# Patient Record
Sex: FEMALE | Race: WHITE | NOT HISPANIC OR LATINO | Employment: FULL TIME | URBAN - METROPOLITAN AREA
[De-identification: names, ages, dates, MRNs, and addresses within clinical notes are randomized per-mention and may not be internally consistent; named-entity substitution may affect disease eponyms.]

---

## 2018-02-28 ENCOUNTER — OFFICE VISIT (OUTPATIENT)
Dept: INTERNAL MEDICINE | Facility: CLINIC | Age: 48
End: 2018-02-28
Payer: COMMERCIAL

## 2018-02-28 VITALS
WEIGHT: 217.81 LBS | SYSTOLIC BLOOD PRESSURE: 128 MMHG | HEART RATE: 110 BPM | DIASTOLIC BLOOD PRESSURE: 82 MMHG | TEMPERATURE: 98 F | BODY MASS INDEX: 33.01 KG/M2 | HEIGHT: 68 IN | OXYGEN SATURATION: 96 %

## 2018-02-28 DIAGNOSIS — R10.9 ABDOMINAL PAIN, UNSPECIFIED ABDOMINAL LOCATION: ICD-10-CM

## 2018-02-28 DIAGNOSIS — H66.90 OTITIS MEDIA, UNSPECIFIED LATERALITY, UNSPECIFIED OTITIS MEDIA TYPE: Primary | ICD-10-CM

## 2018-02-28 DIAGNOSIS — B00.1 COLD SORE: ICD-10-CM

## 2018-02-28 DIAGNOSIS — Z30.9 ENCOUNTER FOR CONTRACEPTIVE MANAGEMENT, UNSPECIFIED TYPE: ICD-10-CM

## 2018-02-28 DIAGNOSIS — K21.9 GASTROESOPHAGEAL REFLUX DISEASE, ESOPHAGITIS PRESENCE NOT SPECIFIED: ICD-10-CM

## 2018-02-28 DIAGNOSIS — N39.0 FREQUENT UTI: ICD-10-CM

## 2018-02-28 PROCEDURE — 99203 OFFICE O/P NEW LOW 30 MIN: CPT | Mod: S$GLB,,, | Performed by: INTERNAL MEDICINE

## 2018-02-28 PROCEDURE — 99999 PR PBB SHADOW E&M-NEW PATIENT-LVL IV: CPT | Mod: PBBFAC,,, | Performed by: INTERNAL MEDICINE

## 2018-02-28 RX ORDER — NORGESTIMATE AND ETHINYL ESTRADIOL 7DAYSX3 LO
1 KIT ORAL DAILY
COMMUNITY
End: 2018-02-28 | Stop reason: SDUPTHER

## 2018-02-28 RX ORDER — ACYCLOVIR 400 MG/1
400 TABLET ORAL 2 TIMES DAILY
Qty: 30 TABLET | Refills: 0 | Status: SHIPPED | OUTPATIENT
Start: 2018-02-28 | End: 2018-04-04 | Stop reason: SDUPTHER

## 2018-02-28 RX ORDER — NORGESTIMATE AND ETHINYL ESTRADIOL 7DAYSX3 LO
1 KIT ORAL DAILY
Qty: 30 TABLET | Refills: 0 | Status: SHIPPED | OUTPATIENT
Start: 2018-02-28 | End: 2018-03-26 | Stop reason: SDUPTHER

## 2018-02-28 RX ORDER — DIPHENOXYLATE HYDROCHLORIDE AND ATROPINE SULFATE 2.5; .025 MG/1; MG/1
1 TABLET ORAL 4 TIMES DAILY PRN
COMMUNITY
End: 2018-02-28 | Stop reason: SDUPTHER

## 2018-02-28 RX ORDER — ACYCLOVIR 400 MG/1
TABLET ORAL 2 TIMES DAILY
COMMUNITY
End: 2018-02-28 | Stop reason: SDUPTHER

## 2018-02-28 RX ORDER — FAMOTIDINE 40 MG/1
40 TABLET, FILM COATED ORAL DAILY
COMMUNITY
End: 2018-02-28 | Stop reason: SDUPTHER

## 2018-02-28 RX ORDER — NITROFURANTOIN 25; 75 MG/1; MG/1
100 CAPSULE ORAL
COMMUNITY
End: 2018-02-28 | Stop reason: SDUPTHER

## 2018-02-28 RX ORDER — DIPHENOXYLATE HYDROCHLORIDE AND ATROPINE SULFATE 2.5; .025 MG/1; MG/1
1 TABLET ORAL 4 TIMES DAILY PRN
Qty: 120 TABLET | Refills: 0 | Status: SHIPPED | OUTPATIENT
Start: 2018-02-28 | End: 2018-04-04 | Stop reason: SDUPTHER

## 2018-02-28 RX ORDER — NITROFURANTOIN 25; 75 MG/1; MG/1
100 CAPSULE ORAL EVERY 12 HOURS
Qty: 15 CAPSULE | Refills: 0 | Status: SHIPPED | OUTPATIENT
Start: 2018-02-28 | End: 2018-04-04 | Stop reason: SDUPTHER

## 2018-02-28 RX ORDER — FAMOTIDINE 40 MG/1
40 TABLET, FILM COATED ORAL DAILY
Qty: 30 TABLET | Refills: 0 | Status: SHIPPED | OUTPATIENT
Start: 2018-02-28 | End: 2018-04-04 | Stop reason: SDUPTHER

## 2018-02-28 RX ORDER — CEPHALEXIN 500 MG/1
500 CAPSULE ORAL EVERY 12 HOURS
Qty: 10 CAPSULE | Refills: 0 | Status: SHIPPED | OUTPATIENT
Start: 2018-02-28 | End: 2018-03-05

## 2018-02-28 NOTE — PATIENT INSTRUCTIONS
Cephalexin tablets or capsules  What is this medicine?  CEPHALEXIN (sef a TOÑO in) is a cephalosporin antibiotic. It is used to treat certain kinds of bacterial infections It will not work for colds, flu, or other viral infections.  How should I use this medicine?  Take this medicine by mouth with a full glass of water. Follow the directions on the prescription label. This medicine can be taken with or without food. Take your medicine at regular intervals. Do not take your medicine more often than directed. Take all of your medicine as directed even if you think you are better. Do not skip doses or stop your medicine early.  Talk to your pediatrician regarding the use of this medicine in children. While this drug may be prescribed for selected conditions, precautions do apply.  What side effects may I notice from receiving this medicine?  Side effects that you should report to your doctor or health care professional as soon as possible:  · allergic reactions like skin rash, itching or hives, swelling of the face, lips, or tongue  · breathing problems  · pain or trouble passing urine  · redness, blistering, peeling or loosening of the skin, including inside the mouth  · severe or watery diarrhea  · unusually weak or tired  · yellowing of the eyes, skin  Side effects that usually do not require medical attention (report to your doctor or health care professional if they continue or are bothersome):  · gas or heartburn  · genital or anal irritation  · headache  · joint or muscle pain  · nausea, vomiting  What may interact with this medicine?  · probenecid  · some other antibiotics  What if I miss a dose?  If you miss a dose, take it as soon as you can. If it is almost time for your next dose, take only that dose. Do not take double or extra doses. There should be at least 4 to 6 hours between doses.  Where should I keep my medicine?  Keep out of the reach of children.  Store at room temperature between 59 and 86  degrees F (15 and 30 degrees C). Throw away any unused medicine after the expiration date.  What should I tell my health care provider before I take this medicine?  They need to know if you have any of these conditions:  · kidney disease  · stomach or intestine problems, especially colitis  · an unusual or allergic reaction to cephalexin, other cephalosporins, penicillins, other antibiotics, medicines, foods, dyes or preservatives  · pregnant or trying to get pregnant  · breast-feeding  What should I watch for while using this medicine?  Tell your doctor or health care professional if your symptoms do not begin to improve in a few days.  Do not treat diarrhea with over the counter products. Contact your doctor if you have diarrhea that lasts more than 2 days or if it is severe and watery.  If you have diabetes, you may get a false-positive result for sugar in your urine. Check with your doctor or health care professional.  NOTE:This sheet is a summary. It may not cover all possible information. If you have questions about this medicine, talk to your doctor, pharmacist, or health care provider. Copyright© 2017 [employer]

## 2018-02-28 NOTE — PROGRESS NOTES
Subjective:       Patient ID: Dyan Arredondo is a 47 y.o. female.    Chief Complaint: Ear Fullness (ear feels like water is in it , right ear )    HPI Mrs. Hope is a 47-year-old female who presents with chief complaint ear fullness.  She reports that 6 days ago she got a shower and she felt like there was fluid in her right ear.  She says it feels like there is liquid or water in there.  She has tried swimmer's ear drop which contains 95% rubbing alcohol.  This helped it a little bit.  The dizziness sensation of water or moisture in there returned.  She denies any hearing loss, fever, drainage from the ear, tinnitus, rhinorrhea. She does have sore throat but does not think this is related. She is leaving for trip to [place] in next few days; will not return for 12 days.    Review of Systems   Constitutional: Negative for fever.   HENT: Positive for ear pain (ear fullness) and sore throat. Negative for congestion, ear discharge and rhinorrhea.        Objective:      Physical Exam   Constitutional: She is oriented to person, place, and time. She appears well-developed and well-nourished. No distress.   HENT:   Head: Normocephalic and atraumatic.   Right Ear: External ear and ear canal normal.   Left Ear: Tympanic membrane, external ear and ear canal normal.   Nose: Nose normal.   Mouth/Throat: Oropharynx is clear and moist. No oropharyngeal exudate.   No erythema of right ear. There does appear to be fluid behind the TM and there is very dull light reflex.   Cardiovascular: Normal rate, regular rhythm, normal heart sounds and intact distal pulses.  Exam reveals no gallop and no friction rub.    No murmur heard.  Pulmonary/Chest: Effort normal and breath sounds normal. No respiratory distress. She has no wheezes. She has no rales.   Neurological: She is alert and oriented to person, place, and time.   Skin: Skin is warm and dry. She is not diaphoretic.   Psychiatric: She has a normal mood and affect. Her behavior  is normal. Judgment and thought content normal.   Vitals reviewed.      Assessment:       1. Otitis media, unspecified laterality, unspecified otitis media type    2. Cold sore    3. Encounter for contraceptive management, unspecified type    4. Abdominal pain, unspecified abdominal location    5. Gastroesophageal reflux disease, esophagitis presence not specified    6. Frequent UTI        Plan:     Discussed with patient that there aren't medicines for absorbing this fluid  As fluid is present behind the Tm, will go ahead and treat with antibiotics for acute otitis media.  Patient will start medication if pain worsens, or if fever, or if discharge from the ear  We discussed her medication allergies. She states that she has taken keflex many times in the past with no adverse reactions. So prescribing keflex for 5 days for treatment.    We also discussed her other medical issues briefly. I am refilling her medications and she will do a full establish care/annual exam visit with me when she returns from [place].

## 2018-03-01 ENCOUNTER — TELEPHONE (OUTPATIENT)
Dept: FAMILY MEDICINE | Facility: CLINIC | Age: 48
End: 2018-03-01

## 2018-03-01 NOTE — TELEPHONE ENCOUNTER
----- Message from Magalys Ace MA sent at 2/28/2018  3:41 PM CST -----  Contact: [phone]/ self       ----- Message -----  From: Zara Chappell  Sent: 2/28/2018  10:17 AM  To: Duane Wahl A Staff    Pt want to know if Dr Zepeda can take in as a new pt , states she heard great thing about her . Please advise

## 2018-03-01 NOTE — TELEPHONE ENCOUNTER
Please advise pt that my panel is closed. Dr. Sakshi Rubio will provide great care to her. Pt seen by Dr. Rubio on 2/28/18.

## 2018-03-05 ENCOUNTER — TELEPHONE (OUTPATIENT)
Dept: FAMILY MEDICINE | Facility: CLINIC | Age: 48
End: 2018-03-05

## 2018-03-05 DIAGNOSIS — H66.90 OTITIS MEDIA, UNSPECIFIED LATERALITY, UNSPECIFIED OTITIS MEDIA TYPE: Primary | ICD-10-CM

## 2018-03-05 NOTE — TELEPHONE ENCOUNTER
----- Message from Zara Chappell sent at 3/5/2018  8:32 AM CST -----  Contact: [phone]/ self   Pt called stating she is still having problems with her ear . Please advise

## 2018-03-05 NOTE — TELEPHONE ENCOUNTER
Spoke with patient and she reports she is on the 11th day of fluid in inner ear. Completed antibiotics today, wondering if she needs another script. Informed will send message to Doctor and call back. Patient voices understanding.

## 2018-03-05 NOTE — TELEPHONE ENCOUNTER
----- Message from Berta Scruggs sent at 3/5/2018  2:19 PM CST -----  Contact: [phone]/self  Patient called in returning your call. Please advise.

## 2018-03-06 ENCOUNTER — TELEPHONE (OUTPATIENT)
Dept: INTERNAL MEDICINE | Facility: CLINIC | Age: 48
End: 2018-03-06

## 2018-03-06 NOTE — TELEPHONE ENCOUNTER
Spoke to adolfo, who previously handled the call yesterday and adolfo adv that Dr Rubio will not prescribe any more antibiotics and that she will put in a referral to ENT. I called the pt and adv them what Adolfo told me per Dr. Rubio, I scheduled pt ENT appt for 03/23/18 at 10:40am! Pt verbalizes understanding!

## 2018-03-06 NOTE — TELEPHONE ENCOUNTER
Spoke to pt , pt is in [place] and is adv she has fluid that I in her ears, been in her ears for 11 days. Pt adv she is done with all antibiotics and wants to know does the antibiotics stay in her system or will she needs a refill so this dosen't become an infection in her ear. Please advise! Pt is also trying to find a pharmacy pout there so medications can be sent to her. I adv will call pt back with Dr. Rubio's Decision! Pt verbalizes understandnng!

## 2018-03-06 NOTE — TELEPHONE ENCOUNTER
----- Message from Stephanie Day sent at 3/6/2018 11:08 AM CST -----  Contact: [phone]/ewjr  Patient states she is returning your call. Please advise.

## 2018-03-23 ENCOUNTER — CLINICAL SUPPORT (OUTPATIENT)
Dept: OTOLARYNGOLOGY | Facility: CLINIC | Age: 48
End: 2018-03-23
Payer: COMMERCIAL

## 2018-03-23 ENCOUNTER — OFFICE VISIT (OUTPATIENT)
Dept: OTOLARYNGOLOGY | Facility: CLINIC | Age: 48
End: 2018-03-23
Payer: COMMERCIAL

## 2018-03-23 VITALS
BODY MASS INDEX: 32.88 KG/M2 | HEART RATE: 86 BPM | DIASTOLIC BLOOD PRESSURE: 93 MMHG | SYSTOLIC BLOOD PRESSURE: 140 MMHG | HEIGHT: 68 IN | WEIGHT: 216.94 LBS

## 2018-03-23 DIAGNOSIS — H69.93 ETD (EUSTACHIAN TUBE DYSFUNCTION), BILATERAL: Primary | ICD-10-CM

## 2018-03-23 DIAGNOSIS — H69.91 ETD (EUSTACHIAN TUBE DYSFUNCTION), RIGHT: Primary | ICD-10-CM

## 2018-03-23 DIAGNOSIS — T16.1XXA FOREIGN BODY OF RIGHT EAR, INITIAL ENCOUNTER: ICD-10-CM

## 2018-03-23 PROCEDURE — 92552 PURE TONE AUDIOMETRY AIR: CPT | Mod: S$GLB,,, | Performed by: AUDIOLOGIST-HEARING AID FITTER

## 2018-03-23 PROCEDURE — 92504 EAR MICROSCOPY EXAMINATION: CPT | Mod: S$GLB,,, | Performed by: OTOLARYNGOLOGY

## 2018-03-23 PROCEDURE — 99999 PR PBB SHADOW E&M-EST. PATIENT-LVL III: CPT | Mod: PBBFAC,,, | Performed by: OTOLARYNGOLOGY

## 2018-03-23 PROCEDURE — 92567 TYMPANOMETRY: CPT | Mod: S$GLB,,, | Performed by: AUDIOLOGIST-HEARING AID FITTER

## 2018-03-23 PROCEDURE — 92556 SPEECH AUDIOMETRY COMPLETE: CPT | Mod: S$GLB,,, | Performed by: AUDIOLOGIST-HEARING AID FITTER

## 2018-03-23 PROCEDURE — 99203 OFFICE O/P NEW LOW 30 MIN: CPT | Mod: 25,S$GLB,, | Performed by: OTOLARYNGOLOGY

## 2018-03-23 PROCEDURE — 99999 PR PBB SHADOW E&M-EST. PATIENT-LVL I: CPT | Mod: PBBFAC,,, | Performed by: AUDIOLOGIST-HEARING AID FITTER

## 2018-03-23 RX ORDER — NORGESTIMATE AND ETHINYL ESTRADIOL 0.25-0.035
1 KIT ORAL DAILY
COMMUNITY
Start: 2018-02-23 | End: 2018-04-04

## 2018-03-23 RX ORDER — FLUTICASONE PROPIONATE 50 MCG
2 SPRAY, SUSPENSION (ML) NASAL DAILY
Qty: 1 BOTTLE | Refills: 12
Start: 2018-03-23 | End: 2018-04-04 | Stop reason: SDUPTHER

## 2018-03-23 NOTE — PROGRESS NOTES
Brayden Costa, F-AAA  [facility] [address]  [address]  [place] [address]      Patient: Dyan Arredondo   MRN: [id number]   : 1970  POSEY: 2018    AUDIOLOGICAL EVALUATION    CASE HISTORY:    Dyan Arredondo, 47 y.o., was seen on the above date for an audiological evaluation. Patient reported fluid in her right ear, possibly her left. No further history significant to hearing loss was reported.    TEST RESULTS:    Otoscopy was unremarkable for both ears. Imittance testing of both ears revealed normal middle ear compliance (Type A). Speech reception thresholds were obtained at 5 dB HL bilaterally, which was consistent with pure tone results. Word recognitions scores of 100% were obtained in both ears using a presentation level of 45 dB HL.    IMPRESSION:   Audiological testing indicated that Dyan Arredondo has normal hearing in both ears.    RECOMMENDATIONS:   There are no audiological recommendations at this time.    If you should have any questions or concerns regarding the above information, please do not hesitate to contact me at [phone].      _______________________________  Lili Costa, CCC-A  Clinical Audiologist    enclosure: audiogram

## 2018-03-23 NOTE — LETTER
March 26, 2018      Sakshi Rubio MD  [address]  [place] [address]           [facility] Otorhinolaryngology  [address] [place] [address], [address]  [place] [address]  Phone: [phone]  Fax: [phone]          Patient: Dyan Arredondo   MR Number: [id number]   YOB: 1970   Date of Visit: 3/23/2018       Dear Dr. Sakshi Rubio:    Thank you for referring Dyan Arredondo to me for evaluation. Attached you will find relevant portions of my assessment and plan of care.    If you have questions, please do not hesitate to call me. I look forward to following Dyan Arredondo along with you.    Sincerely,    Le Slater MD    Enclosure  CC:  No Recipients    If you would like to receive this communication electronically, please contact [email or web address] or [phone] to request more information on [employer] access.    For providers and/or their staff who would like to refer a patient to [facility], please contact us through our one-stop-shop provider referral line, [facility], at [phone].    If you feel you have received this communication in error or would no longer like to receive these types of communications, please e-mail [email or web address]

## 2018-03-23 NOTE — PROGRESS NOTES
"  Chief Complaint   Patient presents with    Other     fluid in right ear    Otalgia     right ear   .     HPI: Dyan Arredondo is a 47 y.o. female who is referred by Dr. Rubio for right ear stuffiness",ear fullness which has been present for 1 month.  She notes a swishing/scratching/fluid noise in her right ear. She reports the symptoms have been are gradual in onsetShe feels her symptoms are gradually worsening.  She has been experiencing nasal congestion, post nasal drip and hearing loss. She reports that she does not have ear pain.  She has been treated with Keflex by Dr. Thakkar for AOM.  She feels this treatment helped somewhat. She has also been self treating with swimmer's ear drops.       History reviewed. No pertinent past medical history.  Social History     Social History    Marital status:      Spouse name: N/A    Number of children: N/A    Years of education: N/A     Occupational History    Not on file.     Social History Main Topics    Smoking status: Never Smoker    Smokeless tobacco: Never Used    Alcohol use Yes    Drug use: No    Sexual activity: Yes     Partners: Male     Other Topics Concern    Not on file     Social History Narrative    No narrative on file     Past Surgical History:   Procedure Laterality Date    EXTERNAL EAR SURGERY      turbanite       Family History   Problem Relation Age of Onset    Cancer Father     Depression Brother            Review of Systems  General: negative for chills, fever or weight loss  Psychological: negative for mood changes or depression  Ophthalmic: negative for blurry vision, photophobia or eye pain  ENT: see HPI  Respiratory: no cough, shortness of breath, or wheezing  Cardiovascular: no chest pain or dyspnea on exertion  Gastrointestinal: no abdominal pain, change in bowel habits, or black/ bloody stools  Musculoskeletal: negative for gait disturbance or muscular weakness  Neurological: no syncope or seizures; no " ataxia  Dermatological: negative for puritis,  rash and jaundice  Hematologic/lymphatic: no easy bruising, no new lumps or bumps      Physical Exam:    Vitals:    03/23/18 1028   BP: (!) 140/93   Pulse: 86       Constitutional: Well appearing / communicating without difficutly.  NAD.  Eyes: EOM I Bilaterally  Head/Face: Normocephalic.  Negative paranasal sinus pressure/tenderness.  Salivary glands WNL.  House Brackmann I Bilaterally.    Right Ear: Auricle normal appearance. External Auditory Canal within normal limits no lesions or masses,TM retracted with limited mobility.; a strand of hair is abutting the TM.    Left Ear: Auricle normal appearance. External Auditory Canal within normal limits no lesions or masses,TM retracted with limited mobility  Nose: No gross nasal septal deviation. Inferior Turbinates 3+ bilaterally. No septal perforation. No masses/lesions. External nasal skin appears normal without masses/lesions.  Oral Cavity: Gingiva/lips within normal limits.  Dentition/gingiva healthy appearing. Mucus membranes moist. Floor of mouth soft, no masses palpated. Oral Tongue mobile. Hard Palate appears normal.    Oropharynx: Base of tongue appears normal. No masses/lesions noted. Tonsillar fossa/pharyngeal wall without lesions. Posterior oropharynx WNL.  Soft palate without masses. Midline uvula.   Neck/Lymphatic: No LAD I-VI bilaterally.  No thyromegaly.  No masses noted on exam.    Mirror laryngoscopy/nasopharyngoscopy: Active gag reflex.  Unable to perform.    Neuro/Psychiatric: AOx3.  Normal mood and affect.   Cardiovascular: Normal carotid pulses bilaterally, no increasing jugular venous distention noted at cervical region bilaterally.    Respiratory: Normal respiratory effort, no stridor, no retractions noted.      See separate procedure note for binocular microscopy.         Audiogram reviewed personally by myself and in detail with the patient today. Findings: normal hearing. Normal imittance testing  bilaterally.       Assessment:    ICD-10-CM ICD-9-CM    1. ETD (Eustachian tube dysfunction), bilateral H69.83 381.81    2. Foreign body of right ear, initial encounter T16.1XXA 931      E915      The primary encounter diagnosis was ETD (Eustachian tube dysfunction), bilateral. A diagnosis of Foreign body of right ear, initial encounter was also pertinent to this visit.      Plan:  No orders of the defined types were placed in this encounter.    We had a long discussion regarding the anatomy and function of the eustachian tube.  We discussed that the eustachian tube acts as a pump to keep the appropriate amount of pressure behind the ear drum. I discussed auto-insufflation exercises.   Continue Flonase as prescribed.    Removal of hair from TM. Expect improvement with the scratching sounds she was noting.       Le Slater MD

## 2018-03-26 DIAGNOSIS — Z30.9 ENCOUNTER FOR CONTRACEPTIVE MANAGEMENT, UNSPECIFIED TYPE: ICD-10-CM

## 2018-03-26 RX ORDER — NORGESTIMATE AND ETHINYL ESTRADIOL 7DAYSX3 LO
1 KIT ORAL DAILY
Qty: 30 TABLET | Refills: 0 | Status: SHIPPED | OUTPATIENT
Start: 2018-03-26 | End: 2018-04-04 | Stop reason: SDUPTHER

## 2018-03-26 RX ORDER — NORGESTIMATE AND ETHINYL ESTRADIOL 7DAYSX3 LO
1 KIT ORAL DAILY
Qty: 30 TABLET | Refills: 0 | Status: SHIPPED | OUTPATIENT
Start: 2018-03-26 | End: 2018-03-26 | Stop reason: SDUPTHER

## 2018-03-26 NOTE — TELEPHONE ENCOUNTER
----- Message from Berta Scruggs sent at 3/23/2018  3:04 PM CDT -----  Contact: [phone]/self  Patient is requesting to have a refill ofnorgestimate-ethinyl estradiol (ORTHO TRI-CYCLEN LO) 0.18/0.215/0.25 mg-25 mcg tablet  sent to [facility] #[id number] - [place], [place] - [address] . Please advise.

## 2018-03-26 NOTE — PROCEDURES
Procedures     Binocular Microscopy    Indication:  foreign body     Additional detail was required for the otoscopic examination of the right ear.  The operating microscope was used to directly visualize the tympanic membrane and middle ear landmarks.  Findings:  Canal skin:  normal   TM:  intact; there was a strand of hair abutting the TM which was removed.    Ossicles:  normal   Effusion:  none     The patient tolerated the procedure well.

## 2018-04-04 ENCOUNTER — LAB VISIT (OUTPATIENT)
Dept: LAB | Facility: HOSPITAL | Age: 48
End: 2018-04-04
Attending: INTERNAL MEDICINE
Payer: COMMERCIAL

## 2018-04-04 ENCOUNTER — OFFICE VISIT (OUTPATIENT)
Dept: INTERNAL MEDICINE | Facility: CLINIC | Age: 48
End: 2018-04-04
Payer: COMMERCIAL

## 2018-04-04 VITALS
TEMPERATURE: 98 F | OXYGEN SATURATION: 97 % | WEIGHT: 216.94 LBS | HEART RATE: 90 BPM | DIASTOLIC BLOOD PRESSURE: 76 MMHG | BODY MASS INDEX: 32.98 KG/M2 | SYSTOLIC BLOOD PRESSURE: 118 MMHG

## 2018-04-04 DIAGNOSIS — Z00.00 ANNUAL PHYSICAL EXAM: ICD-10-CM

## 2018-04-04 DIAGNOSIS — K21.9 GASTROESOPHAGEAL REFLUX DISEASE, ESOPHAGITIS PRESENCE NOT SPECIFIED: ICD-10-CM

## 2018-04-04 DIAGNOSIS — N39.0 FREQUENT UTI: ICD-10-CM

## 2018-04-04 DIAGNOSIS — Z00.00 ANNUAL PHYSICAL EXAM: Primary | ICD-10-CM

## 2018-04-04 DIAGNOSIS — R20.2 TINGLING IN EXTREMITIES: ICD-10-CM

## 2018-04-04 DIAGNOSIS — B00.1 COLD SORE: ICD-10-CM

## 2018-04-04 DIAGNOSIS — Z23 NEED FOR TETANUS BOOSTER: ICD-10-CM

## 2018-04-04 DIAGNOSIS — Z30.9 ENCOUNTER FOR CONTRACEPTIVE MANAGEMENT, UNSPECIFIED TYPE: ICD-10-CM

## 2018-04-04 DIAGNOSIS — Z23 NEED FOR INFLUENZA VACCINATION: ICD-10-CM

## 2018-04-04 DIAGNOSIS — R10.9 ABDOMINAL PAIN, UNSPECIFIED ABDOMINAL LOCATION: ICD-10-CM

## 2018-04-04 LAB
ALBUMIN SERPL BCP-MCNC: 3.9 G/DL
ALP SERPL-CCNC: 63 U/L
ALT SERPL W/O P-5'-P-CCNC: 6 U/L
ANION GAP SERPL CALC-SCNC: 11 MMOL/L
AST SERPL-CCNC: 15 U/L
BASOPHILS # BLD AUTO: 0.11 K/UL
BASOPHILS NFR BLD: 1 %
BILIRUB SERPL-MCNC: 0.3 MG/DL
BUN SERPL-MCNC: 10 MG/DL
CALCIUM SERPL-MCNC: 9.6 MG/DL
CHLORIDE SERPL-SCNC: 107 MMOL/L
CHOLEST SERPL-MCNC: 160 MG/DL
CHOLEST/HDLC SERPL: 2.1 {RATIO}
CO2 SERPL-SCNC: 22 MMOL/L
CREAT SERPL-MCNC: 0.8 MG/DL
DIFFERENTIAL METHOD: ABNORMAL
EOSINOPHIL # BLD AUTO: 0.2 K/UL
EOSINOPHIL NFR BLD: 1.4 %
ERYTHROCYTE [DISTWIDTH] IN BLOOD BY AUTOMATED COUNT: 12.4 %
EST. GFR  (AFRICAN AMERICAN): >60 ML/MIN/1.73 M^2
EST. GFR  (NON AFRICAN AMERICAN): >60 ML/MIN/1.73 M^2
GLUCOSE SERPL-MCNC: 84 MG/DL
HCT VFR BLD AUTO: 43.6 %
HDLC SERPL-MCNC: 77 MG/DL
HDLC SERPL: 48.1 %
HGB BLD-MCNC: 14.1 G/DL
HIV 1+2 AB+HIV1 P24 AG SERPL QL IA: NEGATIVE
IMM GRANULOCYTES # BLD AUTO: 0.04 K/UL
IMM GRANULOCYTES NFR BLD AUTO: 0.4 %
LDLC SERPL CALC-MCNC: 63 MG/DL
LYMPHOCYTES # BLD AUTO: 1.9 K/UL
LYMPHOCYTES NFR BLD: 17.3 %
MCH RBC QN AUTO: 30.4 PG
MCHC RBC AUTO-ENTMCNC: 32.3 G/DL
MCV RBC AUTO: 94 FL
MONOCYTES # BLD AUTO: 0.4 K/UL
MONOCYTES NFR BLD: 3.9 %
NEUTROPHILS # BLD AUTO: 8.3 K/UL
NEUTROPHILS NFR BLD: 76 %
NONHDLC SERPL-MCNC: 83 MG/DL
NRBC BLD-RTO: 0 /100 WBC
PLATELET # BLD AUTO: 361 K/UL
PMV BLD AUTO: 10.4 FL
POTASSIUM SERPL-SCNC: 4.1 MMOL/L
PROT SERPL-MCNC: 7.9 G/DL
RBC # BLD AUTO: 4.64 M/UL
SODIUM SERPL-SCNC: 140 MMOL/L
TRIGL SERPL-MCNC: 100 MG/DL
TSH SERPL DL<=0.005 MIU/L-ACNC: 2.86 UIU/ML
VIT B12 SERPL-MCNC: 353 PG/ML
WBC # BLD AUTO: 10.89 K/UL

## 2018-04-04 PROCEDURE — 82607 VITAMIN B-12: CPT

## 2018-04-04 PROCEDURE — 85025 COMPLETE CBC W/AUTO DIFF WBC: CPT

## 2018-04-04 PROCEDURE — 80061 LIPID PANEL: CPT

## 2018-04-04 PROCEDURE — 90472 IMMUNIZATION ADMIN EACH ADD: CPT | Mod: S$GLB,,, | Performed by: INTERNAL MEDICINE

## 2018-04-04 PROCEDURE — 84443 ASSAY THYROID STIM HORMONE: CPT

## 2018-04-04 PROCEDURE — 87624 HPV HI-RISK TYP POOLED RSLT: CPT

## 2018-04-04 PROCEDURE — 86703 HIV-1/HIV-2 1 RESULT ANTBDY: CPT

## 2018-04-04 PROCEDURE — 36415 COLL VENOUS BLD VENIPUNCTURE: CPT | Mod: PO

## 2018-04-04 PROCEDURE — 90471 IMMUNIZATION ADMIN: CPT | Mod: S$GLB,,, | Performed by: INTERNAL MEDICINE

## 2018-04-04 PROCEDURE — 88175 CYTOPATH C/V AUTO FLUID REDO: CPT

## 2018-04-04 PROCEDURE — 90715 TDAP VACCINE 7 YRS/> IM: CPT | Mod: S$GLB,,, | Performed by: INTERNAL MEDICINE

## 2018-04-04 PROCEDURE — 80053 COMPREHEN METABOLIC PANEL: CPT

## 2018-04-04 PROCEDURE — 99999 PR PBB SHADOW E&M-EST. PATIENT-LVL IV: CPT | Mod: PBBFAC,,, | Performed by: INTERNAL MEDICINE

## 2018-04-04 PROCEDURE — 99396 PREV VISIT EST AGE 40-64: CPT | Mod: 25,S$GLB,, | Performed by: INTERNAL MEDICINE

## 2018-04-04 PROCEDURE — 90688 IIV4 VACCINE SPLT 0.5 ML IM: CPT | Mod: S$GLB,,, | Performed by: INTERNAL MEDICINE

## 2018-04-04 RX ORDER — NORGESTIMATE AND ETHINYL ESTRADIOL 7DAYSX3 LO
1 KIT ORAL DAILY
Qty: 90 TABLET | Refills: 4 | Status: SHIPPED | OUTPATIENT
Start: 2018-04-04 | End: 2018-04-11 | Stop reason: SDUPTHER

## 2018-04-04 RX ORDER — FLUTICASONE PROPIONATE 50 MCG
2 SPRAY, SUSPENSION (ML) NASAL DAILY
Qty: 1 BOTTLE | Refills: 11 | Status: SHIPPED | OUTPATIENT
Start: 2018-04-04 | End: 2019-01-23

## 2018-04-04 RX ORDER — ACYCLOVIR 400 MG/1
400 TABLET ORAL 2 TIMES DAILY
Qty: 30 TABLET | Refills: 1 | Status: SHIPPED | OUTPATIENT
Start: 2018-04-04 | End: 2019-05-03 | Stop reason: SDUPTHER

## 2018-04-04 RX ORDER — DIPHENOXYLATE HYDROCHLORIDE AND ATROPINE SULFATE 2.5; .025 MG/1; MG/1
1 TABLET ORAL 4 TIMES DAILY PRN
Qty: 120 TABLET | Refills: 0 | Status: SHIPPED | OUTPATIENT
Start: 2018-04-04 | End: 2019-05-03 | Stop reason: SDUPTHER

## 2018-04-04 RX ORDER — NITROFURANTOIN 25; 75 MG/1; MG/1
100 CAPSULE ORAL EVERY 12 HOURS
Qty: 30 CAPSULE | Refills: 2 | Status: SHIPPED | OUTPATIENT
Start: 2018-04-04 | End: 2019-01-23

## 2018-04-04 RX ORDER — FAMOTIDINE 40 MG/1
40 TABLET, FILM COATED ORAL DAILY
Qty: 30 TABLET | Refills: 11 | Status: SHIPPED | OUTPATIENT
Start: 2018-04-04 | End: 2018-05-02 | Stop reason: SDUPTHER

## 2018-04-04 NOTE — PROGRESS NOTES
Subjective:       Patient ID: Dyan Arredondo is a 47 y.o. female.    Chief Complaint: Establish Care and Annual Exam    HPI Mrs. Arredondo is a 47 year old female with GERD, history of cold sores, and history of urinary tract infections who presents to establish care and for annual exam.  Today she complains of a tingling sensation on the lateral aspect of her left foot.  It is not associated with pain or numbness.  She does recall trauma to that leg.  She broke her leg a few years ago.  She also continues to have a sensation of fluid in the right ear.  This is not painful.  It has improved since she was seen by me as well as an ENT and started on Flonase.  She has no further complaints today.  She requests HIV testing today.  This is part of her yearly testing.    Past medical history: GERD, cold sores, history of frequent urinary tract infection, abdominal pain  Past surgical history: External ear surgery and turbinate surgery  Social history: Patient does drink alcohol, mostly wine, not more than 2 drinks at a time.  No history of tobacco use or drug use.  Family history: Multiple paternal family relatives with lung cancer (they were smokers).  Father with cancer diagnosed at age 80, type unknown.  Mother with glaucoma.  Other with depression.  ALLERGIES: Amoxicillin, erythromycin, Omnipaque, penicillins, tetracycline  Meds: Acyclovir, Lomotil, Pepcid, Flonase, Macrobid, Ortho Tri-Cyclen    Health Maintenance: Patient is due for Pap smear.  She does have a history of abnormal Pap smears.  She has had multiple biopsies and freezing of areas of the cervix.  She states that none have ever shown any malignancy.  She is also due for mammogram, tetanus vaccine, and influenza vaccine.    Review of Systems   HENT: Positive for ear pain (sensation of fluid in right ear).    Neurological: Negative for numbness.   All other systems reviewed and are negative.      Objective:      Physical Exam   Constitutional: She is  oriented to person, place, and time. She appears well-developed and well-nourished. No distress.   HENT:   Head: Normocephalic and atraumatic.   Right Ear: Tympanic membrane, external ear and ear canal normal.   Left Ear: Tympanic membrane, external ear and ear canal normal.   Eyes: Conjunctivae and EOM are normal.   Cardiovascular: Normal rate, regular rhythm, normal heart sounds and intact distal pulses.  Exam reveals no gallop and no friction rub.    No murmur heard.  Pulmonary/Chest: Effort normal and breath sounds normal. No respiratory distress. She has no wheezes. She has no rales.   Clinical breast exam performed. Normal exam bilaterally. No masses palpated. No supraclavicular or axillary lymphadenopathy. No skin dimpling. No nipple retraction.   Abdominal: Soft. Bowel sounds are normal. She exhibits no distension.   Genitourinary:   Genitourinary Comments: Normal external genitalia. Normal vagina. Pap performed. Bimanual exam done. No masses. No cervical motion tenderness.    Neurological: She is alert and oriented to person, place, and time.   Skin: Skin is warm and dry. She is not diaphoretic.   Psychiatric: She has a normal mood and affect. Her behavior is normal. Judgment and thought content normal.   Vitals reviewed.      Assessment:       1. Annual physical exam    2. Tingling in extremities    3. Need for influenza vaccination    4. Need for tetanus booster    5. Gastroesophageal reflux disease, esophagitis presence not specified    6. Cold sore    7. Abdominal pain, unspecified abdominal location    8. Frequent UTI    9. Encounter for contraceptive management, unspecified type        Plan:     #1 annual physical exam  Pap smear with HPV Co test performed today  CMP, lipids, CBC, TSH, HIV  Mammogram ordered  Influenza and tetanus vaccines given today.    #2 tingling in extremities  Etiology unclear.  Checking TSH, CBC, B12 .  If these are normal, will refer to neurology for further evaluation.    #3  GERD  Stable, well controlled.  Continue current medication    #4 Cold sore  Not currently having outbreak. But requests medication to have on hand should one occur. Refills ordered    #5 Abdominal pain  Stable, well controlled with lomotil. Continue lomotil    #6 Frequent UTI  Stable, well controlled with macrobid as prophylaxis after intercourse  Continue current medication    #7 Contraception  Refills given of Ortho TriCyclen    RTC one year and PRN

## 2018-04-06 LAB
HPV16 AG SPEC QL: POSITIVE
HPV16+18+H RISK 12 DNA CVX-IMP: NEGATIVE
HPV18 DNA SPEC QL NAA+PROBE: NEGATIVE

## 2018-04-11 ENCOUNTER — TELEPHONE (OUTPATIENT)
Dept: FAMILY MEDICINE | Facility: CLINIC | Age: 48
End: 2018-04-11

## 2018-04-11 DIAGNOSIS — Z30.9 ENCOUNTER FOR CONTRACEPTIVE MANAGEMENT, UNSPECIFIED TYPE: ICD-10-CM

## 2018-04-11 DIAGNOSIS — R20.0 NUMBNESS OF FOOT: ICD-10-CM

## 2018-04-11 DIAGNOSIS — R87.811 VAGINAL HIGH RISK HPV DNA TEST POSITIVE: ICD-10-CM

## 2018-04-11 DIAGNOSIS — Z12.31 SCREENING MAMMOGRAM, ENCOUNTER FOR: Primary | ICD-10-CM

## 2018-04-11 RX ORDER — NORGESTIMATE AND ETHINYL ESTRADIOL 7DAYSX3 LO
1 KIT ORAL DAILY
Qty: 90 TABLET | Refills: 4 | Status: SHIPPED | OUTPATIENT
Start: 2018-04-11 | End: 2019-01-23 | Stop reason: SDUPTHER

## 2018-04-11 NOTE — TELEPHONE ENCOUNTER
Talked with Mrs. Arredondo today.  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  She is aware that I'm referring her to OB/GYN for this issue and she will receive a phone call scheduling her for this.  She also mentioned that she needs her birth control sent to the pharmacy, needs a screening mammogram, and is still having the abnormal sensations in the foot.  I am sending prescription for birth control to her pharmacy; ordering her screening mammogram; and referring her to neurology.

## 2018-04-13 ENCOUNTER — TELEPHONE (OUTPATIENT)
Dept: INTERNAL MEDICINE | Facility: CLINIC | Age: 48
End: 2018-04-13

## 2018-04-13 NOTE — TELEPHONE ENCOUNTER
----- Message from Taina Interiano sent at 4/13/2018  8:25 AM CDT -----  No. [phone]    Patient is waiting on a call to schedule her appointments with neurology and OBGYN.

## 2018-04-13 NOTE — TELEPHONE ENCOUNTER
Spoke to pt and adv that mammo is 04/27/18 at 10:15 am , OBGYN 04/23/18 at 3pm, neuro 05/01/18 at 10:40 am scheduled all at [facility]. Pt agreed to appt date and times, appt letters will be mailed out. Pt verbalizes understanding!

## 2018-04-23 ENCOUNTER — OFFICE VISIT (OUTPATIENT)
Dept: OBSTETRICS AND GYNECOLOGY | Facility: CLINIC | Age: 48
End: 2018-04-23
Payer: COMMERCIAL

## 2018-04-23 VITALS — SYSTOLIC BLOOD PRESSURE: 120 MMHG | BODY MASS INDEX: 32.92 KG/M2 | DIASTOLIC BLOOD PRESSURE: 78 MMHG | WEIGHT: 216.5 LBS

## 2018-04-23 DIAGNOSIS — Z20.2 HPV EXPOSURE: Primary | ICD-10-CM

## 2018-04-23 PROCEDURE — 99999 PR PBB SHADOW E&M-EST. PATIENT-LVL III: CPT | Mod: PBBFAC,,, | Performed by: OBSTETRICS & GYNECOLOGY

## 2018-04-23 PROCEDURE — 99203 OFFICE O/P NEW LOW 30 MIN: CPT | Mod: S$GLB,,, | Performed by: OBSTETRICS & GYNECOLOGY

## 2018-04-23 NOTE — LETTER
April 25, 2018      Sakshi Rubio MD  [address]  [place] [address]           [place] - OB/GYN  [address], [address]  [address]th [address] [facility]  [place] [address]  Phone: [phone]          Patient: Dyan Arredondo   MR Number: [id number]   YOB: 1970   Date of Visit: 4/23/2018       Dear Dr. Sakshi Rubio:    Thank you for referring Dyan Arredondo to me for evaluation. Attached you will find relevant portions of my assessment and plan of care.    If you have questions, please do not hesitate to call me. I look forward to following Dyan Arredondo along with you.    Sincerely,    Michael A. Wiedemann, MD    Enclosure  CC:  No Recipients    If you would like to receive this communication electronically, please contact [email or web address] or [phone] to request more information on [employer] Link access.    For providers and/or their staff who would like to refer a patient to [facility], please contact us through our one-stop-shop provider referral line, [facility], at [phone].    If you feel you have received this communication in error or would no longer like to receive these types of communications, please e-mail [email or web address]

## 2018-04-25 NOTE — PROGRESS NOTES
47 y.o.   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  SKIN: No rashes, itching or changes in color or texture of skin.  HEAD: No headaches or recent head trauma.  EYES: Visual acuity fine. No photophobia, ocular pain or diplopia.  EARS: Denies ear pain, discharge or vertigo.  NOSE: No loss of smell, no epistaxis or postnasal drip.  MOUTH & THROAT: No hoarseness or change in voice. No excessive gum bleeding.  NODES: Denies swollen glands.  CHEST: Denies POSEY, cyanosis, wheezing, cough and sputum production.  CARDIOVASCULAR: Denies chest pain, PND, orthopnea or reduced exercise tolerance.  ABDOMEN: Appetite fine. No weight loss. Denies diarrhea, abdominal pain, hematemesis or blood in stool.  URINARY: No flank pain, dysuria or hematuria.  PERIPHERAL VASCULAR: No claudication or cyanosis.  MUSCULOSKELETAL: No joint stiffness or swelling. Denies back pain.  NEUROLOGIC: No history of seizures, paralysis, alteration of gait or coordination      PE: /78   Wt 98.2 kg (216 lb 7.9 oz)   BMI 32.92 kg/m²    No exam done  20 min discussion with pt about hpv paps, pathophysiology etc  Questions answered    Assessment hpv 16 on pap testing  Plan, pt will fu with rpt pap in 4-6 months  Or call/come prn other problems

## 2018-04-27 ENCOUNTER — HOSPITAL ENCOUNTER (OUTPATIENT)
Dept: RADIOLOGY | Facility: HOSPITAL | Age: 48
Discharge: HOME | End: 2018-04-27
Attending: INTERNAL MEDICINE
Payer: COMMERCIAL

## 2018-04-27 DIAGNOSIS — Z12.31 SCREENING MAMMOGRAM, ENCOUNTER FOR: ICD-10-CM

## 2018-04-27 PROCEDURE — 77067 SCR MAMMO BI INCL CAD: CPT | Mod: 26,,, | Performed by: RADIOLOGY

## 2018-04-27 PROCEDURE — 77067 SCR MAMMO BI INCL CAD: CPT | Mod: TC

## 2018-04-27 PROCEDURE — 77063 BREAST TOMOSYNTHESIS BI: CPT | Mod: 26,,, | Performed by: RADIOLOGY

## 2018-04-30 ENCOUNTER — TELEPHONE (OUTPATIENT)
Dept: RADIOLOGY | Facility: HOSPITAL | Age: 48
End: 2018-04-30

## 2018-05-01 ENCOUNTER — OFFICE VISIT (OUTPATIENT)
Dept: NEUROLOGY | Facility: CLINIC | Age: 48
End: 2018-05-01
Payer: COMMERCIAL

## 2018-05-01 ENCOUNTER — LAB VISIT (OUTPATIENT)
Dept: LAB | Facility: HOSPITAL | Age: 48
End: 2018-05-01
Attending: PSYCHIATRY & NEUROLOGY
Payer: COMMERCIAL

## 2018-05-01 VITALS
HEART RATE: 85 BPM | BODY MASS INDEX: 32.51 KG/M2 | DIASTOLIC BLOOD PRESSURE: 87 MMHG | SYSTOLIC BLOOD PRESSURE: 125 MMHG | WEIGHT: 214.5 LBS | HEIGHT: 68 IN

## 2018-05-01 DIAGNOSIS — R20.0 NUMBNESS OF FOOT: ICD-10-CM

## 2018-05-01 DIAGNOSIS — M54.50 LOW BACK PAIN, NON-SPECIFIC: ICD-10-CM

## 2018-05-01 LAB — FOLATE SERPL-MCNC: 10.1 NG/ML

## 2018-05-01 PROCEDURE — 82746 ASSAY OF FOLIC ACID SERUM: CPT

## 2018-05-01 PROCEDURE — 99999 PR PBB SHADOW E&M-EST. PATIENT-LVL III: CPT | Mod: PBBFAC,,, | Performed by: PSYCHIATRY & NEUROLOGY

## 2018-05-01 PROCEDURE — 36415 COLL VENOUS BLD VENIPUNCTURE: CPT

## 2018-05-01 PROCEDURE — 99204 OFFICE O/P NEW MOD 45 MIN: CPT | Mod: S$GLB,,, | Performed by: PSYCHIATRY & NEUROLOGY

## 2018-05-01 PROCEDURE — 84425 ASSAY OF VITAMIN B-1: CPT

## 2018-05-01 NOTE — PROGRESS NOTES
Subjective:       Patient ID: Dyan Arredondo is a 47 y.o. female.    Chief Complaint:  Numbness (and tingling)      History of Present Illness  HPI  Neuropathy  She describes symptoms of tingling. Onset of symptoms was sudden, starting about 6 weeks ago. Symptoms are currently of mild severity. Symptoms occur intermittently and last hours. The patient denies burning, lancinating pain, cramping, hypersensitivity and allodynia. Symptoms are worse in the left lower extremity. She also describes autonomic symptoms of  diarrhea. She denies  orthostasis, bloating, nausea, early satiety, constipation and dry eyes and dry mouth. Previous treatment has included none, which has not improved symptoms.      Review of Systems  Review of Systems   Constitutional: Negative.    HENT:        Water in right ear - otititus media for 5 weeks   Eyes: Negative.    Respiratory: Negative.    Cardiovascular: Negative.    Gastrointestinal:        Heart burn   Endocrine: Negative.    Genitourinary: Negative.    Musculoskeletal: Positive for back pain.        2 episodes in last 2 years   Allergic/Immunologic: Positive for environmental allergies.   Neurological: Positive for numbness.   Hematological: Bruises/bleeds easily.   Psychiatric/Behavioral: Positive for sleep disturbance.   All other systems reviewed and are negative.      Objective:      Neurologic Exam     Mental Status   Oriented to person, place, and time.   Registration: recalls 3 of 3 objects. Recall at 5 minutes: recalls 3 of 3 objects. Follows 3 step commands.   Attention: normal.   Speech: speech is normal   Level of consciousness: alert  Knowledge: good. Able to perform simple calculations.   Able to name object. Able to read. Able to repeat. Able to write. Normal comprehension.     Cranial Nerves   Cranial nerves II through XII intact.     CN III, IV, VI   Pupils are equal, round, and reactive to light.  Extraocular motions are normal.     Motor Exam   Muscle bulk:  normal  Overall muscle tone: normal    Strength   Strength 5/5 throughout.     Sensory Exam   Pinprick normal.   Sensory deficit distribution on left: sural    Gait, Coordination, and Reflexes     Gait  Gait: normal    Coordination   Romberg: negative  Finger to nose coordination: normal  Heel to shin coordination: normal  Tandem walking coordination: normal    Tremor   Resting tremor: absent  Intention tremor: absent  Action tremor: absent    Reflexes   Reflexes 2+ except as noted.       Physical Exam   Constitutional: She is oriented to person, place, and time. She appears well-developed and well-nourished.   HENT:   Head: Normocephalic and atraumatic.   Eyes: EOM are normal. Pupils are equal, round, and reactive to light.   Neck: Normal range of motion.   Cardiovascular: Normal rate and regular rhythm.    Pulmonary/Chest: Effort normal.   Neurological: She is alert and oriented to person, place, and time. She has normal strength. She has a normal Finger-Nose-Finger Test, a normal Heel to Shin Test, a normal Romberg Test and a normal Tandem Gait Test. Gait normal.   Skin: Skin is warm and dry.   Psychiatric: Her speech is normal.   Vitals reviewed.        Assessment:     Problem List Items Addressed This Visit        Other    Numbness of foot     No neurological exam changes but suspect mononeuropathy. With history of LBP, need to r/o radiculopathy..  MRI of LS spine  EMG LLE  B12, folate, thiamine           Relevant Orders    EMG W/ ULTRASOUND AND NERVE CONDUCTION TEST 1 Extremity    FOLATE    VITAMIN B1      Other Visit Diagnoses     Low back pain, non-specific        Relevant Orders    MRI Lumbar Spine Without Contrast            Plan:

## 2018-05-01 NOTE — ASSESSMENT & PLAN NOTE
No neurological exam changes but suspect mononeuropathy. With history of LBP, need to r/o radiculopathy..  MRI of LS spine  EMG LLE  B12, folate, thiamine

## 2018-05-01 NOTE — LETTER
May 1, 2018      Sakshi Rubio MD  [address]  [place] [address]           [facility]  [address]  [place] [address]  Phone: [phone]  Fax: [phone]          Patient: Dyan Arredondo   MR Number: [id number]   YOB: 1970   Date of Visit: 5/1/2018       Dear Dr. Sakshi Rubio:    Thank you for referring Dyan Arredondo to me for evaluation. Attached you will find relevant portions of my assessment and plan of care.    If you have questions, please do not hesitate to call me. I look forward to following Dyan Arredondo along with you.    Sincerely,    Valarie Alexander MD    Enclosure  CC:  No Recipients    If you would like to receive this communication electronically, please contact [email or web address] or [phone] to request more information on [employer] access.    For providers and/or their staff who would like to refer a patient to [facility], please contact us through our one-stop-shop provider referral line, [facility], at [phone].    If you feel you have received this communication in error or would no longer like to receive these types of communications, please e-mail [email or web address]

## 2018-05-02 DIAGNOSIS — K21.9 GASTROESOPHAGEAL REFLUX DISEASE, ESOPHAGITIS PRESENCE NOT SPECIFIED: ICD-10-CM

## 2018-05-02 RX ORDER — FAMOTIDINE 40 MG/1
40 TABLET, FILM COATED ORAL DAILY
Qty: 90 TABLET | Refills: 3 | Status: SHIPPED | OUTPATIENT
Start: 2018-05-02 | End: 2019-05-03 | Stop reason: SDUPTHER

## 2018-05-04 LAB — VIT B1 SERPL-MCNC: 65 UG/L (ref 38–122)

## 2018-05-09 ENCOUNTER — HOSPITAL ENCOUNTER (OUTPATIENT)
Dept: RADIOLOGY | Facility: HOSPITAL | Age: 48
Discharge: HOME | End: 2018-05-09
Attending: PSYCHIATRY & NEUROLOGY
Payer: COMMERCIAL

## 2018-05-09 DIAGNOSIS — M54.50 LOW BACK PAIN, NON-SPECIFIC: ICD-10-CM

## 2018-05-09 PROCEDURE — 72148 MRI LUMBAR SPINE W/O DYE: CPT | Mod: TC

## 2018-05-09 PROCEDURE — 72148 MRI LUMBAR SPINE W/O DYE: CPT | Mod: 26,,, | Performed by: RADIOLOGY

## 2018-05-30 ENCOUNTER — PROCEDURE VISIT (OUTPATIENT)
Dept: NEUROLOGY | Facility: CLINIC | Age: 48
End: 2018-05-30
Payer: COMMERCIAL

## 2018-05-30 DIAGNOSIS — R20.0 NUMBNESS OF FOOT: ICD-10-CM

## 2018-05-30 PROCEDURE — 95908 NRV CNDJ TST 3-4 STUDIES: CPT | Mod: S$GLB,,, | Performed by: NEUROMUSCULOSKELETAL MEDICINE & OMM

## 2018-05-30 PROCEDURE — 95886 MUSC TEST DONE W/N TEST COMP: CPT | Mod: S$GLB,,, | Performed by: NEUROMUSCULOSKELETAL MEDICINE & OMM

## 2018-06-01 ENCOUNTER — TELEPHONE (OUTPATIENT)
Dept: HEMATOLOGY/ONCOLOGY | Facility: CLINIC | Age: 48
End: 2018-06-01

## 2018-06-01 NOTE — TELEPHONE ENCOUNTER
I called the patient to schedule a follow up appointment. For test results, I left a message for a returned call.

## 2018-06-04 ENCOUNTER — TELEPHONE (OUTPATIENT)
Dept: NEUROLOGY | Facility: CLINIC | Age: 48
End: 2018-06-04

## 2018-06-04 NOTE — TELEPHONE ENCOUNTER
The patient is still having the numbness and tingling in the right foot and leg now for the past 2 days the patient has been having numbness to the right hand and would like to be seen. You don't have a schedule at [facility].

## 2018-08-14 ENCOUNTER — OFFICE VISIT (OUTPATIENT)
Dept: NEUROLOGY | Facility: CLINIC | Age: 48
End: 2018-08-14
Payer: COMMERCIAL

## 2018-08-14 VITALS
HEART RATE: 92 BPM | DIASTOLIC BLOOD PRESSURE: 79 MMHG | HEIGHT: 68 IN | WEIGHT: 214.5 LBS | BODY MASS INDEX: 32.51 KG/M2 | SYSTOLIC BLOOD PRESSURE: 121 MMHG

## 2018-08-14 DIAGNOSIS — R20.2 PARESTHESIAS: Primary | ICD-10-CM

## 2018-08-14 PROCEDURE — 3008F BODY MASS INDEX DOCD: CPT | Mod: CPTII,S$GLB,, | Performed by: PSYCHIATRY & NEUROLOGY

## 2018-08-14 PROCEDURE — 99999 PR PBB SHADOW E&M-EST. PATIENT-LVL III: CPT | Mod: PBBFAC,,, | Performed by: PSYCHIATRY & NEUROLOGY

## 2018-08-14 PROCEDURE — 99214 OFFICE O/P EST MOD 30 MIN: CPT | Mod: S$GLB,,, | Performed by: PSYCHIATRY & NEUROLOGY

## 2018-08-14 NOTE — PROGRESS NOTES
"  Dyan Arredondo is a 47 y.o. year old female that  presents to discuss the results of her neurological testing.  Chief Complaint   Patient presents with    Follow-up     MRI results   .     HPI:  I had the pleasure of seeing Mrs Arredondo in follow up today. She is accompany by her .  Patient was initially seen by my colleague Dr Alexander regarding chronic paresthesias lateral aspect L foot who requested MRI L spine and EMG/NCV.   In this regard, EMG/NCV was normal and L spine MRI revealed " mild posterior bulging of the annulus associated with a annular fissure L5-S1 disc space, without spinal stenosis or compression of the descending S1 roots. No significant spinal stenosis or significant disc herniations".  She has no significant past medical history but reports a previous fracture L foot.  Stated that the numbness-tingling of the L foot went away and she hasn't experienced any symptoms in that area for the past couple of months.  On the other hand, she endorses more recent onset of " very mild and sporadic numbness sensation" of the 4th and 5th fingers R hand without other associated neurological symptoms.  Otherwise, she denies low back pain, HA, vertigo, double vision, focal weakness, slurred speech, unsteadiness, dizziness, language or vision impairment.      Past Medical History:   Diagnosis Date    Breast cyst      Social History     Socioeconomic History    Marital status:      Spouse name: Not on file    Number of children: Not on file    Years of education: Not on file    Highest education level: Not on file   Social Needs    Financial resource strain: Not on file    Food insecurity - worry: Not on file    Food insecurity - inability: Not on file    Transportation needs - medical: Not on file    Transportation needs - non-medical: Not on file   Occupational History    Not on file   Tobacco Use    Smoking status: Never Smoker    Smokeless tobacco: Never Used   Substance and " "Sexual Activity    Alcohol use: Yes    Drug use: No    Sexual activity: Yes     Partners: Male   Other Topics Concern    Not on file   Social History Narrative    Not on file     Past Surgical History:   Procedure Laterality Date    BREAST CYST EXCISION      EXTERNAL EAR SURGERY      turbanite       Family History   Problem Relation Age of Onset    Cancer Father     Depression Brother            Review of Systems  General ROS: negative for chills, fever or weight loss  Psychological ROS: negative for hallucination, depression or suicidal ideation  Ophthalmic ROS: negative for blurry vision, photophobia or eye pain  ENT ROS: negative for epistaxis, sore throat or rhinorrhea  Respiratory ROS: no cough, shortness of breath, or wheezing  Cardiovascular ROS: no chest pain or dyspnea on exertion  Gastrointestinal ROS: no abdominal pain, change in bowel habits, or black/ bloody stools  Genito-Urinary ROS: no dysuria, trouble voiding, or hematuria  Musculoskeletal ROS: negative for gait disturbance or muscular weakness  Neurological ROS: no syncope or seizures; no ataxia  Dermatological ROS: negative for pruritis, rash and jaundice      Physical Exam:  /79   Pulse 92   Ht 5' 8" (1.727 m)   Wt 97.3 kg (214 lb 8.1 oz)   BMI 32.62 kg/m²   General appearance: alert, cooperative, no distress  Constitutional:Oriented to person, place, and time.appears well-developed and well-nourished.   HEENT: Normocephalic, atraumatic, neck symmetrical, no nasal discharge   Eyes: conjunctivae/corneas clear, PERRL, EOM's intact  Lungs: clear to auscultation bilaterally, no dullness to percussion bilaterally  Heart: regular rate and rhythm without rub; no displacement of the PMI   Abdomen: soft, non-tender; bowel sounds normoactive; no organomegaly  Extremities: extremities symmetric; no clubbing, cyanosis, or edema  Integument: Skin color, texture, turgor normal; no rashes; hair distrubution normal  Neurologic:   Mental " status: alert and awake, oriented x 4, comprehension, naming, and repetition intact. No right to left confusion. Performs serial 7's without difficulty .No dysarthria.  CN 2-12: pupils 4 mm bilaterally, reactive to light. Fundi without papilledema. Visual fields full to confrontation. EOM full without nystagmus. Face sensation normal in all distributions. Face symmetric. Hearing grossly intact. Palate elevates well. Tongue midline without atrophy or fasciculations.  Motor: 5/5 all over  Sensory: intact in all modalities.  DTR's: 2+ all over.  Plantars: no tested.  Coordination: finger to nose and heel-knee-shin intact.  Gait: no ataxia or bradykinesia  Psychiatric: no pressured speech; normal affect; no evidence of impaired cognition     LABS:    Complete Blood Count  Lab Results   Component Value Date    RBC 4.64 04/04/2018    HGB 14.1 04/04/2018    HCT 43.6 04/04/2018    MCV 94 04/04/2018    MCH 30.4 04/04/2018    MCHC 32.3 04/04/2018    RDW 12.4 04/04/2018     (H) 04/04/2018    MPV 10.4 04/04/2018    GRAN 8.3 (H) 04/04/2018    GRAN 76.0 (H) 04/04/2018    LYMPH 1.9 04/04/2018    LYMPH 17.3 (L) 04/04/2018    MONO 0.4 04/04/2018    MONO 3.9 (L) 04/04/2018    EOS 0.2 04/04/2018    BASO 0.11 04/04/2018    EOSINOPHIL 1.4 04/04/2018    BASOPHIL 1.0 04/04/2018    DIFFMETHOD Automated 04/04/2018       Comprehensive Metabolic Panel  Lab Results   Component Value Date    GLU 84 04/04/2018    BUN 10 04/04/2018    CREATININE 0.8 04/04/2018     04/04/2018    K 4.1 04/04/2018     04/04/2018    PROT 7.9 04/04/2018    ALBUMIN 3.9 04/04/2018    BILITOT 0.3 04/04/2018    AST 15 04/04/2018    ALKPHOS 63 04/04/2018    CO2 22 (L) 04/04/2018    ALT 6 (L) 04/04/2018    ANIONGAP 11 04/04/2018    EGFRNONAA >60.0 04/04/2018    ESTGFRAFRICA >60.0 04/04/2018       TSH  Lab Results   Component Value Date    TSH 2.857 04/04/2018         Assessment: 48 y/o with paresthesias lateral aspect L foot (resolved) and R hand.  Normal  neuro-exam, unimpressive MRI L spine, normal EMG.  Symptoms are reminiscent of involvement of the lateral cutaneous branch of the L sural nerve and R ulnar nerve involvement.  No further neurological testing necessary at this time, as the results will not .  No diagnosis found.  There were no encounter diagnoses.      Plan:  1) Paresthesias L lateral foot (resolved) and R 4TH and 5th fingers R hand: no further intervention at this time.  2) Mild posterior bulging of the annulus associated with a annular fissure L5-S1 disc space, without spinal stenosis or compression of the descending S1 roots: conservative management for now.  No orders of the defined types were placed in this encounter.          Brennen Newton MD

## 2019-01-03 ENCOUNTER — PATIENT MESSAGE (OUTPATIENT)
Dept: INTERNAL MEDICINE | Facility: CLINIC | Age: 49
End: 2019-01-03

## 2019-01-03 ENCOUNTER — OFFICE VISIT (OUTPATIENT)
Dept: INTERNAL MEDICINE | Facility: CLINIC | Age: 49
End: 2019-01-03
Payer: COMMERCIAL

## 2019-01-03 VITALS
WEIGHT: 230.19 LBS | HEIGHT: 68 IN | HEART RATE: 88 BPM | TEMPERATURE: 99 F | SYSTOLIC BLOOD PRESSURE: 118 MMHG | DIASTOLIC BLOOD PRESSURE: 88 MMHG | BODY MASS INDEX: 34.89 KG/M2 | OXYGEN SATURATION: 96 %

## 2019-01-03 DIAGNOSIS — J06.9 UPPER RESPIRATORY TRACT INFECTION, UNSPECIFIED TYPE: Primary | ICD-10-CM

## 2019-01-03 PROCEDURE — 99214 OFFICE O/P EST MOD 30 MIN: CPT | Mod: S$GLB,,, | Performed by: INTERNAL MEDICINE

## 2019-01-03 PROCEDURE — 99999 PR PBB SHADOW E&M-EST. PATIENT-LVL III: ICD-10-PCS | Mod: PBBFAC,,, | Performed by: INTERNAL MEDICINE

## 2019-01-03 PROCEDURE — 3008F BODY MASS INDEX DOCD: CPT | Mod: CPTII,S$GLB,, | Performed by: INTERNAL MEDICINE

## 2019-01-03 PROCEDURE — 99999 PR PBB SHADOW E&M-EST. PATIENT-LVL III: CPT | Mod: PBBFAC,,, | Performed by: INTERNAL MEDICINE

## 2019-01-03 PROCEDURE — 99214 PR OFFICE/OUTPT VISIT, EST, LEVL IV, 30-39 MIN: ICD-10-PCS | Mod: S$GLB,,, | Performed by: INTERNAL MEDICINE

## 2019-01-03 PROCEDURE — 3008F PR BODY MASS INDEX (BMI) DOCUMENTED: ICD-10-PCS | Mod: CPTII,S$GLB,, | Performed by: INTERNAL MEDICINE

## 2019-01-03 RX ORDER — PROMETHAZINE HYDROCHLORIDE AND CODEINE PHOSPHATE 6.25; 1 MG/5ML; MG/5ML
5 SOLUTION ORAL EVERY 6 HOURS PRN
Qty: 118 ML | Refills: 0 | Status: SHIPPED | OUTPATIENT
Start: 2019-01-03 | End: 2019-01-13

## 2019-01-03 NOTE — PROGRESS NOTES
Subjective:       Patient ID: Dyan Arredondo is a 48 y.o. female.    Chief Complaint: Nasal Congestion    HPI Mrs. Arredondo is a 48-year-old female who presents with a chief complaint of cold.  Patient states that she has had a cold for 25 days.  Onset was 25 days ago.  Patient states that it has waxed and waned in that time.  She has had runny nose with associated cough and postnasal drip.  Postnasal drip is better now.  She has not had any associated ear pain or fever.  She reports that her neighbor who she has had recent contact with told her recently that she has walking pneumonia.  Patient has been taking DayQuil and NyQuil and these medications have helped some to relieve her symptoms.  She has also been hydrating herself well and resting.  Symptoms are constant.  Overall, symptoms are improving.    Review of Systems   Constitutional: Negative for fever.   HENT: Positive for postnasal drip and rhinorrhea. Negative for ear pain.    Respiratory: Positive for cough.        Objective:      Physical Exam   Constitutional: She is oriented to person, place, and time. She appears well-developed and well-nourished. No distress.   HENT:   Head: Normocephalic and atraumatic.   Right Ear: External ear normal.   Left Ear: External ear normal.   Nose: Nose normal.   Mouth/Throat: Oropharynx is clear and moist. No oropharyngeal exudate.   Eyes: Conjunctivae and EOM are normal.   Cardiovascular: Normal rate, regular rhythm, normal heart sounds and intact distal pulses. Exam reveals no gallop and no friction rub.   No murmur heard.  Pulmonary/Chest: Effort normal and breath sounds normal. No stridor. No respiratory distress. She has no wheezes. She has no rales.   Neurological: She is alert and oriented to person, place, and time.   Skin: Skin is warm and dry. She is not diaphoretic.   Psychiatric: She has a normal mood and affect. Her behavior is normal. Judgment and thought content normal.   Vitals reviewed.       Assessment:       1. Upper respiratory tract infection, unspecified type        Plan:     1.  Upper respiratory tract infection  Promethazine-codeine 5 mL p.o. q.6 hours p.r.n. cough  Continue rest and hydration  Advised patient that she can use over-the-counter medications such as Flonase and Nasonex regularly.  She can use Afrin for 3 days for quick relief of nasal congestion/postnasal drip.  She can also use over-the-counter Claritin or Zyrtec to help dry secretions    RTC PRN

## 2019-01-03 NOTE — PATIENT INSTRUCTIONS
Codeine; Phenylephrine; Promethazine oral syrup  What is this medicine?  CODEINE; PHENYLEPHRINE; PROMETHAZINE (KOE ryan; fen il EF rin; proe METH a zeen) is a cough suppressant, a decongestant, and an antihistamine. It helps to stop or reduce coughing and congestion of colds or allergies. This medicine will not treat an infection.  How should I use this medicine?  Take this medicine by mouth. Follow the directions on the prescription label. Use a specially marked spoon or container to measure each dose. Ask your pharmacist if you do not have one. Household spoons are not accurate. You can take it with or without food. If it upsets your stomach, take it with food. Take your medicine at regular intervals. Do not take it more often than directed.  A special MedGuide will be given to you by the pharmacist with each prescription and refill. Be sure to read this information carefully each time.  Talk to your pediatrician regarding the use of this medicine in children. While this drug may be prescribed for children as young as 6 years, precautions do apply.  What side effects may I notice from receiving this medicine?  Side effects that you should report to your doctor or health care professional as soon as possible:  · allergic reactions like skin rash, itching or hives, swelling of the face, lips, or tongue  · breathing problems  · confusion  · seizures  · signs and symptoms of low blood pressure like dizziness; feeling faint or lightheaded, falls; unusually weak or tired  · trouble passing urine or change in the amount of urine  Side effects that usually do not require medical attention (report to your doctor or health care professional if they continue or are bothersome):  · constipation  · dry mouth  · nausea, vomiting  · tiredness  What may interact with this medicine?  Do not take this medicine with any of the following medications:  · alcohol  · antihistamines for allergy, cough and cold  · certain medicines for  anxiety or sleep  · certain medicines for depression like amitriptyline, fluoxetine, sertraline  · certain medicines for seizures like phenobarbital, primidone  · cisapride  · dofetilide  · dronedarone  · ergot alkaloids like dihydroergotamine, ergonovine, ergotamine, methylergonovine  · general anesthetics like halothane, isoflurane, methoxyflurane, propofol  · local anesthetics like lidocaine, pramoxine, tetracaine  · MAOIs like Carbex, Eldepryl, Marplan, Nardil, and Parnate  · medicines that relax muscles for surgery  · metoclopramide  · other narcotic medicines for pain or cough  · phenothiazines like chlorpromazine, mesoridazine, prochlorperazine, thioridazine  · pimozide  · quinidine  · ziprasidone  This medicine may also interact with the following medications:  · antiviral medicines for HIV or AIDS  · atropine  · certain antibiotics like erythromycin and clarithromycin  · certain medicines for bladder problems like oxybutynin, tolterodine  · certain medicines for fungal infections like ketoconazole and itraconazole  · certain medicines for Parkinson's disease like benztropine, trihexyphenidyl  · certain medicines for stomach problems like dicyclomine, hyoscyamine  · certain medicines for travel sickness like scopolamine  · ipratropium  · rifampin  · stimulant medicines for attention disorders, weight loss, or to stay awake  What if I miss a dose?  If you miss a dose, take it as soon as you can. If it is almost time for your next dose, take only that dose. Do not take extra or double doses.  Where should I keep my medicine?  Keep out of the reach of children. This drug can be abused. Keep this medicine in a safe place to protect it from theft. Do not share this medicine with anyone. Selling or giving away this medicine is dangerous and is against the law.  This medicine may cause accidental overdose and death if taken by other adults, children, or pets. Mix any unused medicine with a substance like cat  littler or coffee grounds. Then throw the medicine away in a sealed container like a sealed bag or a coffee can with a lid. Do not use the medicine after the expiration date.  Store at room temperature between 20 and 25 degrees C (68 and 77 degrees F). Protect from light.  What should I tell my health care provider before I take this medicine?  They need to know if you have any of these conditions:  · blood vessel disease  · breathing problems like asthma or chronic obstructive pulmonary disease (COPD)  · drug abuse or addiction  · eczema  · glaucoma  · head injury  · heart disease  · lung or breathing disease, like asthma or chronic obstructive pulmonary disease (COPD)  · other chronic illness  · seizure disorder  · sleep apnea  · taken an MAOI like Carbex, Eldepryl, Marplan, Nardil, or Parnate in last 14 days  · thyroid disease  · trouble passing urine  · an allergic or unusual reaction to codeine, phenylephrine, promethazine, phenothiazines, sulfites, other medicines, foods, dyes, or preservatives  · pregnant or trying to get pregnant  · breast-feeding  What should I watch for while using this medicine?  Use exactly as directed by your doctor or health care professional. Do not take more than the recommended dose. You may develop tolerance to this medicine if you take it for a long time. Tolerance means that you will get less cough relief with time. Tell your doctor or health care professional if your symptoms do not improve or if they get worse.  If you have been taking this medicine for a long time, do not suddenly stop taking it because you may develop a severe reaction. Your body becomes used to the medicine. This does NOT mean you are addicted. Addiction is a behavior related to getting and using a drug for a nonmedical reason. If your doctor wants you to stop the medicine, the dose will be slowly lowered over time to avoid any side effects.  There are different types of narcotic medicines (opiates). If you  take more than one type at the same time or if you are taking another medicine that also causes drowsiness, you may have more side effects. Give your health care provider a list of all medicines you use. Your doctor will tell you how much medicine to take. Do not take more medicine than directed. Call emergency for help if you have problems breathing or unusual sleepiness.  You may get drowsy or dizzy. Do not drive, use machinery, or do anything that needs mental alertness until you know how this medicine affects you. Do not stand or sit up quickly, especially if you are an older patient. This reduces the risk of dizzy or fainting spells. Alcohol may interfere with the effect of this medicine. Avoid alcoholic drinks.  Children may be at higher risk for side effects. If your child has slow breathing, noisy breathing, confusion, or unusual sleepiness, stop giving this medicine and get medical help right away.  This medicine can make you more sensitive to the sun. Keep out of the sun. If you cannot avoid being in the sun, wear protective clothing and use sunscreen. Do not use sun lamps or tanning beds/booths.  This medicine will cause constipation. Try to have a bowel movement at least every 2 to 3 days. If you do not have a bowel movement for 3 days, call your doctor or health care professional.  Your mouth may get dry. Chewing sugarless gum or sucking hard candy, and drinking plenty of water may help. Contact your doctor if the problem does not go away or is severe.  NOTE:This sheet is a summary. It may not cover all possible information. If you have questions about this medicine, talk to your doctor, pharmacist, or health care provider. Copyright© 2017 [employer]

## 2019-01-23 ENCOUNTER — OFFICE VISIT (OUTPATIENT)
Dept: FAMILY MEDICINE | Facility: CLINIC | Age: 49
End: 2019-01-23
Payer: COMMERCIAL

## 2019-01-23 ENCOUNTER — HOSPITAL ENCOUNTER (OUTPATIENT)
Dept: RADIOLOGY | Facility: HOSPITAL | Age: 49
Discharge: HOME | End: 2019-01-23
Attending: FAMILY MEDICINE
Payer: COMMERCIAL

## 2019-01-23 VITALS
HEIGHT: 68 IN | DIASTOLIC BLOOD PRESSURE: 88 MMHG | BODY MASS INDEX: 33.88 KG/M2 | SYSTOLIC BLOOD PRESSURE: 136 MMHG | OXYGEN SATURATION: 95 % | TEMPERATURE: 99 F | WEIGHT: 223.56 LBS | HEART RATE: 99 BPM

## 2019-01-23 DIAGNOSIS — R05.8 COUGH PRESENT FOR GREATER THAN 3 WEEKS: ICD-10-CM

## 2019-01-23 DIAGNOSIS — Z30.9 ENCOUNTER FOR CONTRACEPTIVE MANAGEMENT, UNSPECIFIED TYPE: ICD-10-CM

## 2019-01-23 DIAGNOSIS — J01.90 ACUTE BACTERIAL SINUSITIS: Primary | ICD-10-CM

## 2019-01-23 DIAGNOSIS — B96.89 ACUTE BACTERIAL SINUSITIS: Primary | ICD-10-CM

## 2019-01-23 DIAGNOSIS — Z91.09 ENVIRONMENTAL ALLERGIES: ICD-10-CM

## 2019-01-23 PROCEDURE — 71046 X-RAY EXAM CHEST 2 VIEWS: CPT | Mod: TC,FY,PO

## 2019-01-23 PROCEDURE — 71046 XR CHEST PA AND LATERAL: ICD-10-PCS | Mod: 26,,, | Performed by: RADIOLOGY

## 2019-01-23 PROCEDURE — 3008F BODY MASS INDEX DOCD: CPT | Mod: CPTII,S$GLB,, | Performed by: FAMILY MEDICINE

## 2019-01-23 PROCEDURE — 3008F PR BODY MASS INDEX (BMI) DOCUMENTED: ICD-10-PCS | Mod: CPTII,S$GLB,, | Performed by: FAMILY MEDICINE

## 2019-01-23 PROCEDURE — 99214 OFFICE O/P EST MOD 30 MIN: CPT | Mod: S$GLB,,, | Performed by: FAMILY MEDICINE

## 2019-01-23 PROCEDURE — 99999 PR PBB SHADOW E&M-EST. PATIENT-LVL IV: CPT | Mod: PBBFAC,,, | Performed by: FAMILY MEDICINE

## 2019-01-23 PROCEDURE — 71046 X-RAY EXAM CHEST 2 VIEWS: CPT | Mod: 26,,, | Performed by: RADIOLOGY

## 2019-01-23 PROCEDURE — 99999 PR PBB SHADOW E&M-EST. PATIENT-LVL IV: ICD-10-PCS | Mod: PBBFAC,,, | Performed by: FAMILY MEDICINE

## 2019-01-23 PROCEDURE — 99214 PR OFFICE/OUTPT VISIT, EST, LEVL IV, 30-39 MIN: ICD-10-PCS | Mod: S$GLB,,, | Performed by: FAMILY MEDICINE

## 2019-01-23 RX ORDER — BENZONATATE 100 MG/1
100 CAPSULE ORAL 3 TIMES DAILY PRN
Qty: 30 CAPSULE | Refills: 0 | Status: SHIPPED | OUTPATIENT
Start: 2019-01-23 | End: 2019-02-02

## 2019-01-23 RX ORDER — NORGESTIMATE AND ETHINYL ESTRADIOL 7DAYSX3 LO
1 KIT ORAL DAILY
Qty: 90 TABLET | Refills: 0 | Status: SHIPPED | OUTPATIENT
Start: 2019-01-23 | End: 2019-05-03 | Stop reason: SDUPTHER

## 2019-01-23 RX ORDER — PROMETHAZINE HYDROCHLORIDE AND DEXTROMETHORPHAN HYDROBROMIDE 6.25; 15 MG/5ML; MG/5ML
5 SYRUP ORAL EVERY 6 HOURS PRN
Qty: 180 ML | Refills: 0 | Status: SHIPPED | OUTPATIENT
Start: 2019-01-23 | End: 2019-02-02

## 2019-01-23 RX ORDER — OXYMETAZOLINE HCL 0.05 %
1 SPRAY, NON-AEROSOL (ML) NASAL 2 TIMES DAILY
Start: 2019-01-23 | End: 2019-01-26

## 2019-01-23 RX ORDER — CEPHALEXIN 500 MG/1
500 CAPSULE ORAL EVERY 12 HOURS
Qty: 20 CAPSULE | Refills: 0 | Status: SHIPPED | OUTPATIENT
Start: 2019-01-23 | End: 2019-02-02

## 2019-01-23 RX ORDER — AZELASTINE 1 MG/ML
1 SPRAY, METERED NASAL 2 TIMES DAILY
Qty: 30 ML | Refills: 11 | Status: SHIPPED | OUTPATIENT
Start: 2019-01-23 | End: 2021-07-08

## 2019-01-23 RX ORDER — FLUTICASONE PROPIONATE 50 MCG
1 SPRAY, SUSPENSION (ML) NASAL 2 TIMES DAILY
Qty: 16 G | Refills: 0 | Status: SHIPPED | OUTPATIENT
Start: 2019-01-23 | End: 2019-02-22

## 2019-01-23 NOTE — PATIENT INSTRUCTIONS
Discussed diagnosis and treatment of sinusitis  Keflex x 10 days  Suggested brief course of Afrin for 3 days total (OTC)  Flonase BID   astelin nasal spray  Nasal saline spray for congestion every night  Tylenol 500 mg TID or ibuprofen 600 mg three times a day as needed for sore throat  Aggressive fluids  Buckwheat honey for possible cough  Tessalon perles for the cough  Cough syrup  CXR given length of cough  Once antibiotics are complete, restart zyrtec  Follow up if symptoms aren't resolving.  Follow up with PCP as needed      Acute Bacterial Rhinosinusitis (ABRS)    Acute bacterial rhinosinusitis (ABRS) is an infection of your nasal cavity and sinuses. Its caused by bacteria. Acute means that youve had symptoms for less than 12 weeks.  Understanding your sinuses  The nasal cavity is the large air-filled space behind your nose. The sinuses are a group of spaces formed by the bones of your face. They connect with your nasal cavity. ABRS causes the tissue lining these spaces to become inflamed. Mucus may not drain normally. This leads to facial pain and other symptoms.  What causes ABRS?  ABRS most often follows an upper respiratory infection caused by a virus. Bacteria then infect the lining of your nasal cavity and sinuses. But you can also get ABRS if you have:  · Nasal allergies  · Long-term nasal swelling and congestion not caused by allergies  · Blockage in the nose  Symptoms of ABRS  The symptoms of ABRS may be different for each person, and can include:  · Nasal congestion  · Runny nose  · Fluid draining from the nose down the throat (postnasal drip)  · Headache  · Cough  · Pain in the sinuses  · Thick, colored fluid from the nose (mucus)  · Fever  Diagnosing ABRS  ABRS may be diagnosed if youve had an upper respiratory infection like a cold and cough for longer than 10 to 14 days. Your health care provider will ask about your symptoms and your medical history. The provider will check your vital signs,  including your temperature. Youll have a physical exam. The health care provider will check your ears, nose, and throat. You likely wont need any tests. If ABRS comes back, you may have a culture or other tests.  Treatment for ABRS  Treatment may include:  · Antibiotic medicine. This is for symptoms that last for at least 10 to 14 days.  · Nasal corticosteroid medicine. Drops or spray used in the nose can lessen swelling and congestion.  · Over-the-counter pain medicine. This is to lessen sinus pain and pressure.  · Nasal decongestant medicine. Spray or drops may help to lessen congestion. Do not use them for more than a few days.  · Salt wash (saline irrigation). This can help to loosen mucus.  Possible complications of ABRS  ABRS may come back or become long-term (chronic).  In rare cases, ABRS may cause complications such as:   · Inflamed tissue around the brain and spinal cord (meningitis)  · Inflamed tissue around the eyes (orbital cellulitis)  · Inflamed bones around the sinuses (osteitis)  These problems may need to be treated in a hospital with intravenous (IV) antibiotic medicine or surgery.  When to call the health care provider  Call your health care provider if you have any of the following:  · Symptoms that dont get better, or get worse  · Symptoms that dont get better after 3 to 5 days on antibiotics  · Trouble seeing  · Swelling around your eyes  · Confusion or trouble staying awake   Date Last Reviewed: 3/3/2015  © 1070-5878 [employer]. [address] [place] [address], [place] [address]. All rights reserved. This information is not intended as a substitute for professional medical care. Always follow your healthcare professional's instructions.

## 2019-01-23 NOTE — PROGRESS NOTES
Subjective:       Patient ID: Dyan Arredondo is a 48 y.o. female.    Chief Complaint: Cough and Sore Throat    Dyan is a 48 y.o. female who presents today with 6 weeks of symptoms. It slowly got better then got worse. The cough is not productive. The cough is not worse when she lies down. Talking triggers her cough. Her main symptom is a sore throat. This has been ongoing for 5 days. She has initially stopped her allergy medication but she restarted her flonase but this is not helping. Her voice is more hoarse then usual. This has been going on for the last few days. She has multiple antibiotic allergies but has tolerated keflex in the past. She has no recent travel. She has no history of chronic cough until onset of these symptoms.       Cough   This is a new problem. The current episode started more than 1 month ago. The problem has been waxing and waning. The cough is non-productive. Associated symptoms include postnasal drip and a sore throat. Pertinent negatives include no chest pain, chills, ear congestion, ear pain, fever, headaches, heartburn, hemoptysis, rash, rhinorrhea, shortness of breath or wheezing. The treatment provided no relief.   Sore Throat    This is a new problem. The current episode started in the past 7 days. There has been no fever. Associated symptoms include coughing. Pertinent negatives include no congestion, diarrhea, ear pain, headaches, shortness of breath, trouble swallowing or vomiting. She has had no exposure to strep or mono. She has tried nothing for the symptoms. The treatment provided no relief.     Review of Systems   Constitutional: Positive for fatigue. Negative for chills and fever.   HENT: Positive for postnasal drip, sinus pressure, sinus pain and sore throat. Negative for congestion, ear pain, rhinorrhea and trouble swallowing.    Respiratory: Positive for cough. Negative for hemoptysis, shortness of breath and wheezing.    Cardiovascular: Negative for chest pain.    Gastrointestinal: Negative for constipation, diarrhea, heartburn, nausea and vomiting.   Skin: Negative for rash.   Neurological: Negative for headaches.       Objective:     Vitals:    01/23/19 1526   BP: 136/88   Pulse: 99   Temp: 98.5 °F (36.9 °C)        Physical Exam   Constitutional: She is oriented to person, place, and time. She appears well-developed and well-nourished. No distress.   HENT:   Head: Normocephalic and atraumatic.   TM: appear normal BL  Nasal congestion noted BL  Cobblestoning without exudate noted  No adenopathy  Full ROM of neck   Eyes: Conjunctivae and EOM are normal. Pupils are equal, round, and reactive to light.   Neck: Normal range of motion. Neck supple.   Cardiovascular: Normal rate and regular rhythm.   Pulmonary/Chest: Effort normal and breath sounds normal.   Neurological: She is alert and oriented to person, place, and time.   Skin: Skin is warm. She is not diaphoretic.   Psychiatric: She has a normal mood and affect. Her behavior is normal. Judgment and thought content normal.   Nursing note and vitals reviewed.      Assessment:       1. Acute bacterial sinusitis    2. Cough present for greater than 3 weeks    3. Environmental allergies        Plan:       Discussed diagnosis and treatment of sinusitis  Keflex x 10 days  Suggested brief course of Afrin for 3 days total (OTC)  Flonase BID   astelin nasal spray  Nasal saline spray for congestion every night  Tylenol 500 mg TID or ibuprofen 600 mg three times a day as needed for sore throat  Aggressive fluids  Buckwheat honey for possible cough  Tessalon perles for the cough  Cough syrup  CXR given length of cough  Once antibiotics are complete, restart zyrtec  Follow up if symptoms aren't resolving.  Follow up with PCP as needed    Acute bacterial sinusitis  -     cephALEXin (KEFLEX) 500 MG capsule; Take 1 capsule (500 mg total) by mouth every 12 (twelve) hours. for 10 days  Dispense: 20 capsule; Refill: 0  -     fluticasone  (FLONASE) 50 mcg/actuation nasal spray; 1 spray (50 mcg total) by Each Nare route 2 (two) times daily.  Dispense: 16 g; Refill: 0  -     oxymetazoline (AFRIN, OXYMETAZOLINE,) 0.05 % nasal spray; 1 spray by Nasal route 2 (two) times daily. for 3 days  -     benzonatate (TESSALON) 100 MG capsule; Take 1 capsule (100 mg total) by mouth 3 (three) times daily as needed for Cough.  Dispense: 30 capsule; Refill: 0  -     azelastine (ASTELIN) 137 mcg (0.1 %) nasal spray; 1 spray (137 mcg total) by Nasal route 2 (two) times daily.  Dispense: 30 mL; Refill: 11  -     promethazine-dextromethorphan (PROMETHAZINE-DM) 6.25-15 mg/5 mL Syrp; Take 5 mLs by mouth every 6 (six) hours as needed.  Dispense: 180 mL; Refill: 0    Cough present for greater than 3 weeks  -     X-Ray Chest PA And Lateral; Future; Expected date: 01/23/2019    Environmental allergies      Warning signs discussed, patient to call with any further issues or worsening of symptoms.

## 2019-05-03 ENCOUNTER — OFFICE VISIT (OUTPATIENT)
Dept: INTERNAL MEDICINE | Facility: CLINIC | Age: 49
End: 2019-05-03
Payer: COMMERCIAL

## 2019-05-03 VITALS
DIASTOLIC BLOOD PRESSURE: 80 MMHG | SYSTOLIC BLOOD PRESSURE: 136 MMHG | TEMPERATURE: 99 F | HEIGHT: 68 IN | OXYGEN SATURATION: 98 % | WEIGHT: 221.13 LBS | HEART RATE: 93 BPM | BODY MASS INDEX: 33.51 KG/M2

## 2019-05-03 DIAGNOSIS — Z00.00 ANNUAL PHYSICAL EXAM: Primary | ICD-10-CM

## 2019-05-03 DIAGNOSIS — L91.8 SKIN TAG: ICD-10-CM

## 2019-05-03 DIAGNOSIS — R10.9 ABDOMINAL PAIN, UNSPECIFIED ABDOMINAL LOCATION: ICD-10-CM

## 2019-05-03 DIAGNOSIS — Z12.31 SCREENING MAMMOGRAM, ENCOUNTER FOR: ICD-10-CM

## 2019-05-03 DIAGNOSIS — R87.810 PAP SMEAR OF CERVIX SHOWS HIGH RISK HPV PRESENT: ICD-10-CM

## 2019-05-03 DIAGNOSIS — M51.37 DEGENERATIVE DISC DISEASE AT L5-S1 LEVEL: ICD-10-CM

## 2019-05-03 DIAGNOSIS — K21.9 GASTROESOPHAGEAL REFLUX DISEASE, ESOPHAGITIS PRESENCE NOT SPECIFIED: ICD-10-CM

## 2019-05-03 DIAGNOSIS — N30.00 ACUTE CYSTITIS WITHOUT HEMATURIA: ICD-10-CM

## 2019-05-03 DIAGNOSIS — Z30.9 ENCOUNTER FOR CONTRACEPTIVE MANAGEMENT, UNSPECIFIED TYPE: ICD-10-CM

## 2019-05-03 DIAGNOSIS — L98.9 SKIN LESION OF FOOT: ICD-10-CM

## 2019-05-03 DIAGNOSIS — B00.1 COLD SORE: ICD-10-CM

## 2019-05-03 PROCEDURE — 99999 PR PBB SHADOW E&M-EST. PATIENT-LVL V: ICD-10-PCS | Mod: PBBFAC,,, | Performed by: INTERNAL MEDICINE

## 2019-05-03 PROCEDURE — 99396 PREV VISIT EST AGE 40-64: CPT | Mod: S$GLB,,, | Performed by: INTERNAL MEDICINE

## 2019-05-03 PROCEDURE — 99396 PR PREVENTIVE VISIT,EST,40-64: ICD-10-PCS | Mod: S$GLB,,, | Performed by: INTERNAL MEDICINE

## 2019-05-03 PROCEDURE — 99999 PR PBB SHADOW E&M-EST. PATIENT-LVL V: CPT | Mod: PBBFAC,,, | Performed by: INTERNAL MEDICINE

## 2019-05-03 RX ORDER — NITROFURANTOIN 25; 75 MG/1; MG/1
100 CAPSULE ORAL
COMMUNITY
End: 2019-05-03 | Stop reason: SDUPTHER

## 2019-05-03 RX ORDER — DIPHENOXYLATE HYDROCHLORIDE AND ATROPINE SULFATE 2.5; .025 MG/1; MG/1
1 TABLET ORAL 4 TIMES DAILY PRN
Qty: 120 TABLET | Refills: 0 | Status: SHIPPED | OUTPATIENT
Start: 2019-05-03 | End: 2020-03-17 | Stop reason: SDUPTHER

## 2019-05-03 RX ORDER — FAMOTIDINE 40 MG/1
40 TABLET, FILM COATED ORAL DAILY
Qty: 90 TABLET | Refills: 3 | Status: SHIPPED | OUTPATIENT
Start: 2019-05-03 | End: 2020-03-17 | Stop reason: SDUPTHER

## 2019-05-03 RX ORDER — NITROFURANTOIN 25; 75 MG/1; MG/1
100 CAPSULE ORAL EVERY 12 HOURS
Qty: 60 CAPSULE | Refills: 3 | Status: SHIPPED | OUTPATIENT
Start: 2019-05-03 | End: 2020-03-17 | Stop reason: SDUPTHER

## 2019-05-03 RX ORDER — NORGESTIMATE AND ETHINYL ESTRADIOL 7DAYSX3 LO
1 KIT ORAL DAILY
Qty: 90 TABLET | Refills: 3 | Status: SHIPPED | OUTPATIENT
Start: 2019-05-03 | End: 2020-03-17 | Stop reason: SDUPTHER

## 2019-05-03 RX ORDER — ACYCLOVIR 400 MG/1
400 TABLET ORAL 2 TIMES DAILY
Qty: 30 TABLET | Refills: 1 | Status: SHIPPED | OUTPATIENT
Start: 2019-05-03 | End: 2020-03-17 | Stop reason: SDUPTHER

## 2019-05-03 NOTE — PROGRESS NOTES
Subjective:       Patient ID: Dyan Arredondo is a 48 y.o. female.    Chief Complaint: Annual Exam    HPI Mrs. Arredondo is a 48-year-old female who presents for annual exam.  She reports having some skin tags that she would like removed.  She is aware that they are benign, but reports that they are bothersome.  For instance there is one in the axillary region and sometimes it will get in the way when she is trying to shave.  She also reports having some bumps on her feet.  They are chronic and have been there for years.  She reports that is due to wearing high heels in the past.  She has had doctors tell her that these are benign.  So she is not overly concerned about them.  But she would like them removed for cosmetic purposes.  In light of the recent measles outbreak, she is wondering if she should be revaccinated.  She has no further acute complaints or concerns today.  She followed up with OBGYN Dr. Ambriz for the positive high risk HPV result. (see his note for more info)  She requests refills of all her medications.    Review of Systems   Musculoskeletal:        Bumps on feet   Skin:        Skin tags   All other systems reviewed and are negative.      Objective:      Physical Exam   Constitutional: She is oriented to person, place, and time. She appears well-developed and well-nourished. No distress.   HENT:   Head: Normocephalic.   Right Ear: Tympanic membrane, external ear and ear canal normal.   Left Ear: Tympanic membrane, external ear and ear canal normal.   Nose: Nose normal.   Mouth/Throat: Oropharynx is clear and moist. No oropharyngeal exudate.   Eyes: Conjunctivae and EOM are normal.   Neck: Neck supple. No tracheal deviation present. No thyromegaly present.   Cardiovascular: Normal rate, regular rhythm, normal heart sounds and intact distal pulses. Exam reveals no gallop and no friction rub.   No murmur heard.  Pulmonary/Chest: Effort normal and breath sounds normal. No stridor. No respiratory  distress. She has no wheezes. She has no rales.   Abdominal: Soft. Bowel sounds are normal. She exhibits no distension and no mass. There is no tenderness. There is no rebound and no guarding.   Lymphadenopathy:     She has no cervical adenopathy.   Neurological: She is alert and oriented to person, place, and time.   Skin: Skin is warm and dry. She is not diaphoretic.   Multiple circular flesh colored palpable lesions on bilateral feet, possible cysts vs lipomas    Skin tag visible in left axillary region   Psychiatric: She has a normal mood and affect. Her behavior is normal. Judgment and thought content normal.   Vitals reviewed.      Assessment:       1. Annual physical exam    2. Cold sore    3. Abdominal pain, unspecified abdominal location    4. Gastroesophageal reflux disease, esophagitis presence not specified    5. Encounter for contraceptive management, unspecified type    6. Acute cystitis without hematuria    7. Screening mammogram, encounter for    8. Degenerative disc disease at L5-S1 level    9. Skin lesion of foot    10. Skin tag        Plan:     1.  Annual exam  CMP, CBC, A1c, TSH, lipids  Screening mammogram pending  Up-to-date with Pap smear    2.  Cold sore  Stable, well controlled  Continue current medication as needed    3.  Abdominal pain  Stable, well controlled  Continue Lomotil as needed    4.  GERD  Stable, well controlled  Continue current medication    5.  Encounter for contraceptive management  Refill of birth control medication given    6.  Acute cystitis without hematuria  Patient uses Macrobid after intercourse to prevent urinary tract infections  Refill of Macrobid given    7.  Degenerative disc disease  See recent MRI results.  Patient presented with form today requesting that she not have to lift heavy items at work.  This was filled out for her.  No further interventions needed at this time.    8.  Skin lesion of foot  Referral placed to Podiatry    9.  Skin tags  Referral  placed to Dermatology    Discussed with patient that since she received only one dose of MMR vaccine in 1970, I recommend that she either be re-vaccinated with a 2 dose series of MMR OR check titers first. She will discuss this with her insurance company and let me know how she wants to proceed.    Return to clinic in 1 year and as needed

## 2019-05-03 NOTE — PATIENT INSTRUCTIONS
I would like to either:  Check MMR (Measles, Mumps, Rubella) titers OR  Revaccinate you with 2 doses of MMR vaccines

## 2019-05-15 RX ORDER — FLUTICASONE PROPIONATE 50 MCG
SPRAY, SUSPENSION (ML) NASAL
Qty: 16 ML | Refills: 8 | Status: SHIPPED | OUTPATIENT
Start: 2019-05-15 | End: 2020-03-17 | Stop reason: SDUPTHER

## 2019-05-22 ENCOUNTER — TELEPHONE (OUTPATIENT)
Dept: ADMINISTRATIVE | Facility: OTHER | Age: 49
End: 2019-05-22

## 2019-05-31 ENCOUNTER — TELEPHONE (OUTPATIENT)
Dept: ADMINISTRATIVE | Facility: OTHER | Age: 49
End: 2019-05-31

## 2019-06-11 ENCOUNTER — HOSPITAL ENCOUNTER (OUTPATIENT)
Dept: RADIOLOGY | Facility: HOSPITAL | Age: 49
Discharge: HOME | End: 2019-06-11
Attending: INTERNAL MEDICINE
Payer: COMMERCIAL

## 2019-06-11 DIAGNOSIS — Z12.31 SCREENING MAMMOGRAM, ENCOUNTER FOR: ICD-10-CM

## 2019-06-11 PROCEDURE — 77067 MAMMO DIGITAL SCREENING BILAT WITH TOMOSYNTHESIS_CAD: ICD-10-PCS | Mod: 26,,, | Performed by: RADIOLOGY

## 2019-06-11 PROCEDURE — 77063 MAMMO DIGITAL SCREENING BILAT WITH TOMOSYNTHESIS_CAD: ICD-10-PCS | Mod: 26,,, | Performed by: RADIOLOGY

## 2019-06-11 PROCEDURE — 77063 BREAST TOMOSYNTHESIS BI: CPT | Mod: 26,,, | Performed by: RADIOLOGY

## 2019-06-11 PROCEDURE — 77067 SCR MAMMO BI INCL CAD: CPT | Mod: TC

## 2019-06-11 PROCEDURE — 77067 SCR MAMMO BI INCL CAD: CPT | Mod: 26,,, | Performed by: RADIOLOGY

## 2019-06-28 ENCOUNTER — OFFICE VISIT (OUTPATIENT)
Dept: PODIATRY | Facility: CLINIC | Age: 49
End: 2019-06-28
Payer: COMMERCIAL

## 2019-06-28 VITALS
HEART RATE: 95 BPM | SYSTOLIC BLOOD PRESSURE: 137 MMHG | HEIGHT: 68 IN | BODY MASS INDEX: 33.49 KG/M2 | DIASTOLIC BLOOD PRESSURE: 93 MMHG | WEIGHT: 221 LBS

## 2019-06-28 DIAGNOSIS — M20.5X2 ACQUIRED CURLY TOE, LEFT: Primary | ICD-10-CM

## 2019-06-28 DIAGNOSIS — M21.621 TAILOR'S BUNION OF BOTH FEET: ICD-10-CM

## 2019-06-28 DIAGNOSIS — M21.622 TAILOR'S BUNION OF BOTH FEET: ICD-10-CM

## 2019-06-28 DIAGNOSIS — D17.20 LIPOMA OF LOWER EXTREMITY, UNSPECIFIED LATERALITY: ICD-10-CM

## 2019-06-28 PROCEDURE — 3008F BODY MASS INDEX DOCD: CPT | Mod: CPTII,S$GLB,, | Performed by: PODIATRIST

## 2019-06-28 PROCEDURE — 99203 PR OFFICE/OUTPT VISIT, NEW, LEVL III, 30-44 MIN: ICD-10-PCS | Mod: S$GLB,,, | Performed by: PODIATRIST

## 2019-06-28 PROCEDURE — 3008F PR BODY MASS INDEX (BMI) DOCUMENTED: ICD-10-PCS | Mod: CPTII,S$GLB,, | Performed by: PODIATRIST

## 2019-06-28 PROCEDURE — 99999 PR PBB SHADOW E&M-EST. PATIENT-LVL IV: ICD-10-PCS | Mod: PBBFAC,,, | Performed by: PODIATRIST

## 2019-06-28 PROCEDURE — 99999 PR PBB SHADOW E&M-EST. PATIENT-LVL IV: CPT | Mod: PBBFAC,,, | Performed by: PODIATRIST

## 2019-06-28 PROCEDURE — 99203 OFFICE O/P NEW LOW 30 MIN: CPT | Mod: S$GLB,,, | Performed by: PODIATRIST

## 2019-06-28 NOTE — PROGRESS NOTES
"Subjective:      Patient ID: Dyan Arredondo is a 48 y.o. female.    Chief Complaint: Foot Problem (bilateral bumps under skin inner feet )    Presents today per referral her primary care doctor for concerns of lumps or developing around the medial heel bilateral.  Says that they are getting a little larger over time.  The do not hurt.  There is no trauma associated with this.  She also complains of a curled left 5th toe that does not her but she is concerned that is starting to underlap the 4th toe.    Vitals:    06/28/19 0801   BP: (!) 137/93   Pulse: 95   Weight: 100.2 kg (221 lb)   Height: 5' 8" (1.727 m)   PainSc: 0-No pain      Past Medical History:   Diagnosis Date    Breast cyst        Past Surgical History:   Procedure Laterality Date    BREAST CYST EXCISION      EXTERNAL EAR SURGERY      turbanite         Family History   Problem Relation Age of Onset    Cancer Father     Depression Brother        Social History     Socioeconomic History    Marital status:      Spouse name: Not on file    Number of children: Not on file    Years of education: Not on file    Highest education level: Not on file   Occupational History    Not on file   Social Needs    Financial resource strain: Not on file    Food insecurity:     Worry: Not on file     Inability: Not on file    Transportation needs:     Medical: Not on file     Non-medical: Not on file   Tobacco Use    Smoking status: Never Smoker    Smokeless tobacco: Never Used   Substance and Sexual Activity    Alcohol use: Yes    Drug use: No    Sexual activity: Yes     Partners: Male   Lifestyle    Physical activity:     Days per week: Not on file     Minutes per session: Not on file    Stress: Not on file   Relationships    Social connections:     Talks on phone: Not on file     Gets together: Not on file     Attends Christianity service: Not on file     Active member of club or organization: Not on file     Attends meetings of clubs or " organizations: Not on file     Relationship status: Not on file   Other Topics Concern    Not on file   Social History Narrative    Not on file       Current Outpatient Medications   Medication Sig Dispense Refill    acyclovir (ZOVIRAX) 400 MG tablet Take 1 tablet (400 mg total) by mouth 2 (two) times daily. Start at onset of symptoms. Take for 5 days total 30 tablet 1    azelastine (ASTELIN) 137 mcg (0.1 %) nasal spray 1 spray (137 mcg total) by Nasal route 2 (two) times daily. 30 mL 11    diphenoxylate-atropine 2.5-0.025 mg (LOMOTIL) 2.5-0.025 mg per tablet Take 1 tablet by mouth 4 (four) times daily as needed for Diarrhea. 120 tablet 0    famotidine (PEPCID) 40 MG tablet Take 1 tablet (40 mg total) by mouth once daily. 90 tablet 3    fluticasone propionate (FLONASE) 50 mcg/actuation nasal spray USE 2 SPRAYS (100 MCG TOTAL) BY EACH NARE ROUTE ONCE DAILY. 16 mL 8    nitrofurantoin, macrocrystal-monohydrate, (MACROBID) 100 MG capsule Take 1 capsule (100 mg total) by mouth every 12 (twelve) hours. 60 capsule 3    norgestimate-ethinyl estradiol (ORTHO TRI-CYCLEN LO) 0.18/0.215/0.25 mg-25 mcg tablet Take 1 tablet by mouth once daily. 90 tablet 3     No current facility-administered medications for this visit.        Review of patient's allergies indicates:   Allergen Reactions    Amoxicillin trihydrate Rash     Rash and hives     Erythromycin base Rash     Rash and hives     Omnipaque 140 [iohexol] Rash     Rash and hives     Penicillins Rash     Rash and hives     Tetracyclines Hives and Rash     TETRACYCLINE Hydrochloride          Review of Systems   Constitution: Negative for chills, fever and malaise/fatigue.   HENT: Negative for congestion.    Cardiovascular: Negative for chest pain, claudication and leg swelling.   Respiratory: Negative for cough and shortness of breath.    Skin: Negative for color change, itching, poor wound healing and rash.   Musculoskeletal: Negative for back pain, joint pain,  muscle cramps and muscle weakness.   Gastrointestinal: Negative for nausea and vomiting.   Neurological: Negative for numbness, paresthesias and weakness.   Psychiatric/Behavioral: Negative for altered mental status.           Objective:      Physical Exam   Constitutional: She is oriented to person, place, and time. She appears well-developed and well-nourished. No distress.   Cardiovascular: Intact distal pulses.   Pulses:       Dorsalis pedis pulses are 2+ on the right side, and 2+ on the left side.        Posterior tibial pulses are 2+ on the right side, and 2+ on the left side.   Musculoskeletal:   Multiple palpable soft mass is which appear to be herniating through the fascial layer to the skin and most likely consistent with lipoma is to the medial heel bilateral. No pain on palpation or discoloration or edema noted.    Flexible adductovarus flexion contracture of the left 5th toe.  There is a tailor's bunion noted to the foot bilateral.    Supinated foot structure bilateral foot.   Neurological: She is alert and oriented to person, place, and time. She has normal strength. No sensory deficit.   Skin: Skin is warm, dry and intact. Capillary refill takes less than 2 seconds. No ecchymosis and no rash noted. She is not diaphoretic. No cyanosis or erythema. No pallor. Nails show no clubbing.                     Assessment:       Encounter Diagnoses   Name Primary?    Acquired curly toe, left Yes    Tailor's bunion of both feet     Lipoma of lower extremity, unspecified laterality          Plan:       Dyan was seen today for foot problem.    Diagnoses and all orders for this visit:    Acquired curly toe, left    Tailor's bunion of both feet    Lipoma of lower extremity, unspecified laterality      I counseled the patient on her conditions, their implications and medical management.    Clinically appears as though the patient may have small lipoma was around the bilateral heel region that could be hereditary  in nature.  Discussed possibly trying to inject these areas with steroid versus surgical excision if they get large in and causing irritation.  Is extremely rare for any irritation from this kind of mass.  There are to that overlie the abductor muscle/distal tarsal region of the right medial hindfoot which could not 3 give her initiate a future.  Discussed observing this and reporting any adverse symptoms.    Discussed utilizing a toe splint or crest pad to the left 5th toe versus surgical intervention consisting of a possible flexor tenotomy versus correcting the tailor's bunion and arthroplasty.  Patient elects to continue conservative care at this time.    Discussed appropriate sizing and fitting of shoes ensure there is adequate room in the toe box.    Return to clinic as needed.    .

## 2019-06-28 NOTE — LETTER
June 28, 2019      Sakshi Rubio MD  [address]  [place] [address]           [facility] [facility]  [address]  [place] [address]  Phone: [phone]  Fax: [phone]          Patient: Dyan Arredondo   MR Number: [id number]   YOB: 1970   Date of Visit: 6/28/2019       Dear Dr. Sakshi Rubio:    Thank you for referring Dyan Arredondo to me for evaluation. Attached you will find relevant portions of my assessment and plan of care.    If you have questions, please do not hesitate to call me. I look forward to following Dyan Arredondo along with you.    Sincerely,    Earl Chaney, DPPAULINA    Enclosure  CC:  No Recipients    If you would like to receive this communication electronically, please contact [email or web address] or [phone] to request more information on [employer] Link access.    For providers and/or their staff who would like to refer a patient to [facility], please contact us through our one-stop-shop provider referral line, [facility], at [phone].    If you feel you have received this communication in error or would no longer like to receive these types of communications, please e-mail [email or web address]

## 2019-11-29 DIAGNOSIS — Z12.11 COLON CANCER SCREENING: ICD-10-CM

## 2020-03-17 DIAGNOSIS — B00.1 COLD SORE: ICD-10-CM

## 2020-03-17 DIAGNOSIS — R10.9 ABDOMINAL PAIN, UNSPECIFIED ABDOMINAL LOCATION: ICD-10-CM

## 2020-03-17 DIAGNOSIS — Z30.9 ENCOUNTER FOR CONTRACEPTIVE MANAGEMENT, UNSPECIFIED TYPE: ICD-10-CM

## 2020-03-17 DIAGNOSIS — N30.00 ACUTE CYSTITIS WITHOUT HEMATURIA: ICD-10-CM

## 2020-03-17 DIAGNOSIS — K21.9 GASTROESOPHAGEAL REFLUX DISEASE, ESOPHAGITIS PRESENCE NOT SPECIFIED: ICD-10-CM

## 2020-03-18 RX ORDER — ACYCLOVIR 400 MG/1
400 TABLET ORAL 2 TIMES DAILY
Qty: 30 TABLET | Refills: 1 | Status: SHIPPED | OUTPATIENT
Start: 2020-03-18 | End: 2020-03-30

## 2020-03-18 RX ORDER — FAMOTIDINE 40 MG/1
40 TABLET, FILM COATED ORAL DAILY
Qty: 90 TABLET | Refills: 3 | Status: SHIPPED | OUTPATIENT
Start: 2020-03-18 | End: 2021-03-01

## 2020-03-18 RX ORDER — NORGESTIMATE AND ETHINYL ESTRADIOL 7DAYSX3 LO
1 KIT ORAL DAILY
Qty: 90 TABLET | Refills: 3 | Status: SHIPPED | OUTPATIENT
Start: 2020-03-18 | End: 2020-12-18 | Stop reason: SDUPTHER

## 2020-03-18 RX ORDER — DIPHENOXYLATE HYDROCHLORIDE AND ATROPINE SULFATE 2.5; .025 MG/1; MG/1
1 TABLET ORAL 4 TIMES DAILY PRN
Qty: 120 TABLET | Refills: 0 | Status: SHIPPED | OUTPATIENT
Start: 2020-03-18 | End: 2020-12-18 | Stop reason: SDUPTHER

## 2020-03-18 RX ORDER — FLUTICASONE PROPIONATE 50 MCG
1 SPRAY, SUSPENSION (ML) NASAL DAILY
Qty: 16 ML | Refills: 8 | Status: SHIPPED | OUTPATIENT
Start: 2020-03-18 | End: 2020-11-30

## 2020-03-18 RX ORDER — NITROFURANTOIN 25; 75 MG/1; MG/1
100 CAPSULE ORAL EVERY 12 HOURS
Qty: 60 CAPSULE | Refills: 3 | Status: SHIPPED | OUTPATIENT
Start: 2020-03-18 | End: 2020-12-18 | Stop reason: SDUPTHER

## 2020-03-27 DIAGNOSIS — B00.1 COLD SORE: ICD-10-CM

## 2020-03-28 ENCOUNTER — PATIENT MESSAGE (OUTPATIENT)
Dept: INTERNAL MEDICINE | Facility: CLINIC | Age: 50
End: 2020-03-28

## 2020-03-30 RX ORDER — ACYCLOVIR 400 MG/1
TABLET ORAL
Qty: 30 TABLET | Refills: 1 | Status: SHIPPED | OUTPATIENT
Start: 2020-03-30 | End: 2020-04-09

## 2020-04-01 DIAGNOSIS — J20.8 VIRAL BRONCHITIS: Primary | ICD-10-CM

## 2020-04-01 RX ORDER — ALBUTEROL SULFATE 90 UG/1
2 AEROSOL, METERED RESPIRATORY (INHALATION) EVERY 6 HOURS PRN
Qty: 18 G | Refills: 1 | Status: SHIPPED | OUTPATIENT
Start: 2020-04-01 | End: 2021-07-08

## 2020-04-07 ENCOUNTER — PATIENT MESSAGE (OUTPATIENT)
Dept: INTERNAL MEDICINE | Facility: CLINIC | Age: 50
End: 2020-04-07

## 2020-04-07 DIAGNOSIS — R05.9 COUGH: Primary | ICD-10-CM

## 2020-04-07 DIAGNOSIS — R53.83 FATIGUE, UNSPECIFIED TYPE: ICD-10-CM

## 2020-04-09 ENCOUNTER — CLINICAL SUPPORT (OUTPATIENT)
Dept: INTERNAL MEDICINE | Facility: CLINIC | Age: 50
End: 2020-04-09
Payer: COMMERCIAL

## 2020-04-09 DIAGNOSIS — B00.1 COLD SORE: ICD-10-CM

## 2020-04-09 DIAGNOSIS — R05.9 COUGH: ICD-10-CM

## 2020-04-09 DIAGNOSIS — R53.83 FATIGUE, UNSPECIFIED TYPE: ICD-10-CM

## 2020-04-09 PROCEDURE — U0002 COVID-19 LAB TEST NON-CDC: HCPCS

## 2020-04-09 RX ORDER — ACYCLOVIR 400 MG/1
TABLET ORAL
Qty: 30 TABLET | Refills: 1 | Status: SHIPPED | OUTPATIENT
Start: 2020-04-09 | End: 2020-05-21 | Stop reason: SDUPTHER

## 2020-04-10 LAB — SARS-COV-2 RNA RESP QL NAA+PROBE: DETECTED

## 2020-05-20 DIAGNOSIS — B00.1 COLD SORE: ICD-10-CM

## 2020-05-20 RX ORDER — ACYCLOVIR 400 MG/1
TABLET ORAL
Qty: 30 TABLET | Refills: 1 | Status: CANCELLED | OUTPATIENT
Start: 2020-05-20

## 2020-05-21 DIAGNOSIS — B00.1 COLD SORE: ICD-10-CM

## 2020-05-21 RX ORDER — ACYCLOVIR 400 MG/1
TABLET ORAL
Qty: 30 TABLET | Refills: 1 | Status: SHIPPED | OUTPATIENT
Start: 2020-05-21 | End: 2020-12-18 | Stop reason: SDUPTHER

## 2020-10-05 ENCOUNTER — PATIENT MESSAGE (OUTPATIENT)
Dept: ADMINISTRATIVE | Facility: HOSPITAL | Age: 50
End: 2020-10-05

## 2020-12-02 ENCOUNTER — TELEPHONE (OUTPATIENT)
Dept: INTERNAL MEDICINE | Facility: CLINIC | Age: 50
End: 2020-12-02

## 2020-12-02 DIAGNOSIS — Z12.31 SCREENING MAMMOGRAM, ENCOUNTER FOR: Primary | ICD-10-CM

## 2020-12-04 ENCOUNTER — HOSPITAL ENCOUNTER (OUTPATIENT)
Dept: RADIOLOGY | Facility: HOSPITAL | Age: 50
Discharge: HOME | End: 2020-12-04
Attending: INTERNAL MEDICINE
Payer: COMMERCIAL

## 2020-12-04 DIAGNOSIS — Z12.31 SCREENING MAMMOGRAM, ENCOUNTER FOR: ICD-10-CM

## 2020-12-04 PROCEDURE — 77067 SCR MAMMO BI INCL CAD: CPT | Mod: 26,,, | Performed by: RADIOLOGY

## 2020-12-04 PROCEDURE — 77063 MAMMO DIGITAL SCREENING BILAT WITH TOMO: ICD-10-PCS | Mod: 26,,, | Performed by: RADIOLOGY

## 2020-12-04 PROCEDURE — 77067 MAMMO DIGITAL SCREENING BILAT WITH TOMO: ICD-10-PCS | Mod: 26,,, | Performed by: RADIOLOGY

## 2020-12-04 PROCEDURE — 77063 BREAST TOMOSYNTHESIS BI: CPT | Mod: 26,,, | Performed by: RADIOLOGY

## 2020-12-04 PROCEDURE — 77067 SCR MAMMO BI INCL CAD: CPT | Mod: TC

## 2020-12-11 ENCOUNTER — HOSPITAL ENCOUNTER (OUTPATIENT)
Dept: RADIOLOGY | Facility: HOSPITAL | Age: 50
Discharge: HOME | End: 2020-12-11
Attending: INTERNAL MEDICINE
Payer: COMMERCIAL

## 2020-12-11 DIAGNOSIS — R92.8 ABNORMAL MAMMOGRAM: ICD-10-CM

## 2020-12-11 PROCEDURE — 76642 ULTRASOUND BREAST LIMITED: CPT | Mod: 26,RT,, | Performed by: RADIOLOGY

## 2020-12-11 PROCEDURE — 76642 ULTRASOUND BREAST LIMITED: CPT | Mod: TC,RT

## 2020-12-11 PROCEDURE — 76642 US BREAST RIGHT LIMITED: ICD-10-PCS | Mod: 26,RT,, | Performed by: RADIOLOGY

## 2020-12-18 ENCOUNTER — LAB VISIT (OUTPATIENT)
Dept: LAB | Facility: HOSPITAL | Age: 50
End: 2020-12-18
Attending: INTERNAL MEDICINE
Payer: COMMERCIAL

## 2020-12-18 ENCOUNTER — OFFICE VISIT (OUTPATIENT)
Dept: INTERNAL MEDICINE | Facility: CLINIC | Age: 50
End: 2020-12-18
Payer: COMMERCIAL

## 2020-12-18 VITALS
OXYGEN SATURATION: 99 % | WEIGHT: 220.44 LBS | HEIGHT: 68 IN | TEMPERATURE: 97 F | SYSTOLIC BLOOD PRESSURE: 110 MMHG | BODY MASS INDEX: 33.41 KG/M2 | HEART RATE: 100 BPM | DIASTOLIC BLOOD PRESSURE: 70 MMHG

## 2020-12-18 DIAGNOSIS — B97.7 HIGH RISK HPV INFECTION: ICD-10-CM

## 2020-12-18 DIAGNOSIS — Z12.11 ENCOUNTER FOR SCREENING COLONOSCOPY: ICD-10-CM

## 2020-12-18 DIAGNOSIS — R10.9 ABDOMINAL PAIN, UNSPECIFIED ABDOMINAL LOCATION: ICD-10-CM

## 2020-12-18 DIAGNOSIS — Z30.9 ENCOUNTER FOR CONTRACEPTIVE MANAGEMENT, UNSPECIFIED TYPE: ICD-10-CM

## 2020-12-18 DIAGNOSIS — B00.1 COLD SORE: ICD-10-CM

## 2020-12-18 DIAGNOSIS — D22.9 ATYPICAL MOLE: ICD-10-CM

## 2020-12-18 DIAGNOSIS — Z00.00 ANNUAL PHYSICAL EXAM: ICD-10-CM

## 2020-12-18 DIAGNOSIS — N30.00 ACUTE CYSTITIS WITHOUT HEMATURIA: ICD-10-CM

## 2020-12-18 DIAGNOSIS — Z00.00 ANNUAL PHYSICAL EXAM: Primary | ICD-10-CM

## 2020-12-18 DIAGNOSIS — Z23 NEED FOR INFLUENZA VACCINATION: ICD-10-CM

## 2020-12-18 LAB
ALBUMIN SERPL BCP-MCNC: 4.1 G/DL (ref 3.5–5.2)
ALP SERPL-CCNC: 66 U/L (ref 55–135)
ALT SERPL W/O P-5'-P-CCNC: 9 U/L (ref 10–44)
ANION GAP SERPL CALC-SCNC: 9 MMOL/L (ref 8–16)
AST SERPL-CCNC: 16 U/L (ref 10–40)
BASOPHILS # BLD AUTO: 0.09 K/UL (ref 0–0.2)
BASOPHILS NFR BLD: 0.9 % (ref 0–1.9)
BILIRUB SERPL-MCNC: 0.2 MG/DL (ref 0.1–1)
BUN SERPL-MCNC: 11 MG/DL (ref 6–20)
CALCIUM SERPL-MCNC: 9.2 MG/DL (ref 8.7–10.5)
CHLORIDE SERPL-SCNC: 104 MMOL/L (ref 95–110)
CHOLEST SERPL-MCNC: 170 MG/DL (ref 120–199)
CHOLEST/HDLC SERPL: 2.2 {RATIO} (ref 2–5)
CO2 SERPL-SCNC: 24 MMOL/L (ref 23–29)
CREAT SERPL-MCNC: 0.8 MG/DL (ref 0.5–1.4)
DIFFERENTIAL METHOD: ABNORMAL
EOSINOPHIL # BLD AUTO: 0.2 K/UL (ref 0–0.5)
EOSINOPHIL NFR BLD: 1.5 % (ref 0–8)
ERYTHROCYTE [DISTWIDTH] IN BLOOD BY AUTOMATED COUNT: 12.2 % (ref 11.5–14.5)
EST. GFR  (AFRICAN AMERICAN): >60 ML/MIN/1.73 M^2
EST. GFR  (NON AFRICAN AMERICAN): >60 ML/MIN/1.73 M^2
ESTIMATED AVG GLUCOSE: 97 MG/DL (ref 68–131)
GLUCOSE SERPL-MCNC: 84 MG/DL (ref 70–110)
HBA1C MFR BLD HPLC: 5 % (ref 4–5.6)
HCT VFR BLD AUTO: 44.4 % (ref 37–48.5)
HDLC SERPL-MCNC: 77 MG/DL (ref 40–75)
HDLC SERPL: 45.3 % (ref 20–50)
HGB BLD-MCNC: 13.8 G/DL (ref 12–16)
IMM GRANULOCYTES # BLD AUTO: 0.03 K/UL (ref 0–0.04)
IMM GRANULOCYTES NFR BLD AUTO: 0.3 % (ref 0–0.5)
LDLC SERPL CALC-MCNC: 72 MG/DL (ref 63–159)
LYMPHOCYTES # BLD AUTO: 1.9 K/UL (ref 1–4.8)
LYMPHOCYTES NFR BLD: 19.1 % (ref 18–48)
MCH RBC QN AUTO: 30.1 PG (ref 27–31)
MCHC RBC AUTO-ENTMCNC: 31.1 G/DL (ref 32–36)
MCV RBC AUTO: 97 FL (ref 82–98)
MONOCYTES # BLD AUTO: 0.4 K/UL (ref 0.3–1)
MONOCYTES NFR BLD: 4.1 % (ref 4–15)
NEUTROPHILS # BLD AUTO: 7.2 K/UL (ref 1.8–7.7)
NEUTROPHILS NFR BLD: 74.1 % (ref 38–73)
NONHDLC SERPL-MCNC: 93 MG/DL
NRBC BLD-RTO: 0 /100 WBC
PLATELET # BLD AUTO: 309 K/UL (ref 150–350)
PMV BLD AUTO: 10.5 FL (ref 9.2–12.9)
POTASSIUM SERPL-SCNC: 4.3 MMOL/L (ref 3.5–5.1)
PROT SERPL-MCNC: 7.9 G/DL (ref 6–8.4)
RBC # BLD AUTO: 4.58 M/UL (ref 4–5.4)
SODIUM SERPL-SCNC: 137 MMOL/L (ref 136–145)
TRIGL SERPL-MCNC: 105 MG/DL (ref 30–150)
TSH SERPL DL<=0.005 MIU/L-ACNC: 2.98 UIU/ML (ref 0.4–4)
WBC # BLD AUTO: 9.76 K/UL (ref 3.9–12.7)

## 2020-12-18 PROCEDURE — 87624 HPV HI-RISK TYP POOLED RSLT: CPT

## 2020-12-18 PROCEDURE — 84443 ASSAY THYROID STIM HORMONE: CPT

## 2020-12-18 PROCEDURE — 80061 LIPID PANEL: CPT

## 2020-12-18 PROCEDURE — 3008F BODY MASS INDEX DOCD: CPT | Mod: CPTII,S$GLB,, | Performed by: INTERNAL MEDICINE

## 2020-12-18 PROCEDURE — 90686 FLU VACCINE (QUAD) GREATER THAN OR EQUAL TO 3YO PRESERVATIVE FREE IM: ICD-10-PCS | Mod: S$GLB,,, | Performed by: INTERNAL MEDICINE

## 2020-12-18 PROCEDURE — 99999 PR PBB SHADOW E&M-EST. PATIENT-LVL IV: ICD-10-PCS | Mod: PBBFAC,,, | Performed by: INTERNAL MEDICINE

## 2020-12-18 PROCEDURE — 83036 HEMOGLOBIN GLYCOSYLATED A1C: CPT

## 2020-12-18 PROCEDURE — 90471 IMMUNIZATION ADMIN: CPT | Mod: S$GLB,,, | Performed by: INTERNAL MEDICINE

## 2020-12-18 PROCEDURE — 99396 PREV VISIT EST AGE 40-64: CPT | Mod: 25,S$GLB,, | Performed by: INTERNAL MEDICINE

## 2020-12-18 PROCEDURE — 88175 CYTOPATH C/V AUTO FLUID REDO: CPT

## 2020-12-18 PROCEDURE — 86803 HEPATITIS C AB TEST: CPT

## 2020-12-18 PROCEDURE — 90471 FLU VACCINE (QUAD) GREATER THAN OR EQUAL TO 3YO PRESERVATIVE FREE IM: ICD-10-PCS | Mod: S$GLB,,, | Performed by: INTERNAL MEDICINE

## 2020-12-18 PROCEDURE — 3008F PR BODY MASS INDEX (BMI) DOCUMENTED: ICD-10-PCS | Mod: CPTII,S$GLB,, | Performed by: INTERNAL MEDICINE

## 2020-12-18 PROCEDURE — 1126F PR PAIN SEVERITY QUANTIFIED, NO PAIN PRESENT: ICD-10-PCS | Mod: S$GLB,,, | Performed by: INTERNAL MEDICINE

## 2020-12-18 PROCEDURE — 80053 COMPREHEN METABOLIC PANEL: CPT

## 2020-12-18 PROCEDURE — 36415 COLL VENOUS BLD VENIPUNCTURE: CPT | Mod: PO

## 2020-12-18 PROCEDURE — 99396 PR PREVENTIVE VISIT,EST,40-64: ICD-10-PCS | Mod: 25,S$GLB,, | Performed by: INTERNAL MEDICINE

## 2020-12-18 PROCEDURE — 85025 COMPLETE CBC W/AUTO DIFF WBC: CPT

## 2020-12-18 PROCEDURE — 90686 IIV4 VACC NO PRSV 0.5 ML IM: CPT | Mod: S$GLB,,, | Performed by: INTERNAL MEDICINE

## 2020-12-18 PROCEDURE — 99999 PR PBB SHADOW E&M-EST. PATIENT-LVL IV: CPT | Mod: PBBFAC,,, | Performed by: INTERNAL MEDICINE

## 2020-12-18 PROCEDURE — 1126F AMNT PAIN NOTED NONE PRSNT: CPT | Mod: S$GLB,,, | Performed by: INTERNAL MEDICINE

## 2020-12-18 RX ORDER — ACYCLOVIR 400 MG/1
TABLET ORAL
Qty: 30 TABLET | Refills: 1 | Status: SHIPPED | OUTPATIENT
Start: 2020-12-18 | End: 2020-12-30

## 2020-12-18 RX ORDER — DIPHENOXYLATE HYDROCHLORIDE AND ATROPINE SULFATE 2.5; .025 MG/1; MG/1
1 TABLET ORAL 4 TIMES DAILY PRN
Qty: 120 TABLET | Refills: 0 | Status: SHIPPED | OUTPATIENT
Start: 2020-12-18 | End: 2022-04-03 | Stop reason: SDUPTHER

## 2020-12-18 RX ORDER — NITROFURANTOIN 25; 75 MG/1; MG/1
100 CAPSULE ORAL EVERY 12 HOURS
Qty: 60 CAPSULE | Refills: 3 | Status: SHIPPED | OUTPATIENT
Start: 2020-12-18 | End: 2022-04-03 | Stop reason: SDUPTHER

## 2020-12-18 RX ORDER — NORGESTIMATE AND ETHINYL ESTRADIOL 7DAYSX3 LO
1 KIT ORAL DAILY
Qty: 90 TABLET | Refills: 3 | Status: SHIPPED | OUTPATIENT
Start: 2020-12-18 | End: 2020-12-21 | Stop reason: SDUPTHER

## 2020-12-18 NOTE — PROGRESS NOTES
Patient ID: Dyan Arredondo is a 50 y.o. female.    Chief Complaint: Annual Exam    HPI  Dyan is a 50 y.o. female with high-risk HPV who presents for annual exam.  She has a mole that she would like to have removed by Dermatology.  She had a few questions about famotidine.  She still feels some mental fatigue since being tested positive for COVID in April.  She saw Dr. Wiedemann (OBGYN) about 1 year ago for positive HPV.  She was supposed to follow-up 4-6 months later with repeat Pap smear but did not.    Health Maintenance Topics with due status: Not Due       Topic Last Completion Date    TETANUS VACCINE 04/04/2018    Cervical Cancer Screening 04/04/2018    Lipid Panel 04/04/2018    Mammogram 12/04/2020     Review of Systems   Skin:        Mole    All other systems reviewed and are negative.      Objective:     Vitals:    12/18/20 0812   BP: 110/70   Pulse: 100   Temp: 97.3 °F (36.3 °C)        Physical Exam  Vitals signs reviewed. Exam conducted with a chaperone present.   Constitutional:       General: She is not in acute distress.     Appearance: She is well-developed. She is not diaphoretic.   HENT:      Head: Normocephalic and atraumatic.      Right Ear: External ear normal.      Left Ear: External ear normal.      Nose: Nose normal.   Eyes:      General: No scleral icterus.        Right eye: No discharge.         Left eye: No discharge.      Extraocular Movements: Extraocular movements intact.      Conjunctiva/sclera: Conjunctivae normal.   Neck:      Musculoskeletal: Neck supple.      Thyroid: No thyromegaly.      Trachea: No tracheal deviation.   Cardiovascular:      Rate and Rhythm: Normal rate and regular rhythm.      Heart sounds: Normal heart sounds. No murmur. No friction rub. No gallop.    Pulmonary:      Effort: Pulmonary effort is normal. No respiratory distress.      Breath sounds: Normal breath sounds. No stridor. No wheezing, rhonchi or rales.      Comments: Ecchymosis to left breast (patient  states this was due to a dresser falling on top of her).  Otherwise normal bilateral breast exam.  No masses palpated.  No supraclavicular axillary lymphadenopathy.  No skin dimpling or nipple retraction.  Chest:      Chest wall: No tenderness.   Genitourinary:     General: Normal vulva.      Exam position: Lithotomy position.      Comments: Unable to visualize cervix.  Samples taken for Pap smear and HPV testing.  Musculoskeletal:         General: No tenderness or deformity.      Right lower leg: No edema.      Left lower leg: No edema.   Lymphadenopathy:      Cervical: No cervical adenopathy.   Skin:     General: Skin is warm and dry.      Findings: No erythema or rash.   Neurological:      General: No focal deficit present.      Mental Status: She is alert and oriented to person, place, and time. Mental status is at baseline.   Psychiatric:         Mood and Affect: Mood normal.         Behavior: Behavior normal.         Thought Content: Thought content normal.         Judgment: Judgment normal.         Assessment:       1. Annual physical exam    2. Atypical mole Active   3. Need for influenza vaccination    4. Encounter for screening colonoscopy    5. High risk HPV infection Active       Plan:     All questions answered.     Annual physical exam  -     CBC Auto Differential; Future; Expected date: 12/18/2020  -     Comprehensive Metabolic Panel; Future; Expected date: 12/18/2020  -     Hemoglobin A1C; Future; Expected date: 12/18/2020  -     Lipid Panel; Future; Expected date: 12/18/2020  -     Hepatitis C Antibody; Future; Expected date: 12/18/2020  -     TSH; Future; Expected date: 12/18/2020  -     Liquid-Based Pap Smear, Screening  -     HPV High Risk Genotypes, PCR    Atypical mole  -     Ambulatory referral/consult to Dermatology; Future; Expected date: 12/25/2020    Need for influenza vaccination  -     Influenza - Quadrivalent *Preferred* (6 months+) (PF)    Encounter for screening colonoscopy  -      Case Request Endoscopy: COLONOSCOPY    High risk HPV infection  Comments:  Re-test for HPV. If again positive, will refer back to OB-GYN        RTC one year     Warning signs discussed, patient to call with any further issues or worsening of symptoms.       Parts of the above note were dictated using a voice dictation software. Please excuse any grammatical or typographical errors.

## 2020-12-21 DIAGNOSIS — Z30.9 ENCOUNTER FOR CONTRACEPTIVE MANAGEMENT, UNSPECIFIED TYPE: ICD-10-CM

## 2020-12-21 LAB — HCV AB SERPL QL IA: NEGATIVE

## 2020-12-21 RX ORDER — NORGESTIMATE AND ETHINYL ESTRADIOL 7DAYSX3 LO
1 KIT ORAL DAILY
Qty: 90 TABLET | Refills: 0 | Status: SHIPPED | OUTPATIENT
Start: 2020-12-21 | End: 2021-02-15 | Stop reason: SDUPTHER

## 2020-12-23 LAB
HPV HR 12 DNA SPEC QL NAA+PROBE: NEGATIVE
HPV16 AG SPEC QL: POSITIVE
HPV18 DNA SPEC QL NAA+PROBE: NEGATIVE

## 2020-12-27 LAB
FINAL PATHOLOGIC DIAGNOSIS: NORMAL
Lab: NORMAL

## 2021-01-01 DIAGNOSIS — B97.7 HIGH RISK HPV INFECTION: Primary | ICD-10-CM

## 2021-01-11 ENCOUNTER — PATIENT OUTREACH (OUTPATIENT)
Dept: ADMINISTRATIVE | Facility: OTHER | Age: 51
End: 2021-01-11

## 2021-01-12 ENCOUNTER — TELEPHONE (OUTPATIENT)
Dept: GASTROENTEROLOGY | Facility: CLINIC | Age: 51
End: 2021-01-12

## 2021-01-12 ENCOUNTER — OFFICE VISIT (OUTPATIENT)
Dept: DERMATOLOGY | Facility: CLINIC | Age: 51
End: 2021-01-12
Payer: COMMERCIAL

## 2021-01-12 VITALS — WEIGHT: 220 LBS | BODY MASS INDEX: 33.45 KG/M2

## 2021-01-12 DIAGNOSIS — R20.9 DISTURBANCE OF SKIN SENSATION: ICD-10-CM

## 2021-01-12 DIAGNOSIS — L71.9 ROSACEA: ICD-10-CM

## 2021-01-12 DIAGNOSIS — L82.1 SK (SEBORRHEIC KERATOSIS): Primary | ICD-10-CM

## 2021-01-12 DIAGNOSIS — L91.8 CUTANEOUS SKIN TAGS: ICD-10-CM

## 2021-01-12 DIAGNOSIS — D22.9 ATYPICAL MOLE: ICD-10-CM

## 2021-01-12 DIAGNOSIS — Z12.83 SKIN EXAM, SCREENING FOR CANCER: ICD-10-CM

## 2021-01-12 DIAGNOSIS — B00.1 COLD SORE: ICD-10-CM

## 2021-01-12 DIAGNOSIS — D22.9 NEVUS OF MULTIPLE SITES: ICD-10-CM

## 2021-01-12 DIAGNOSIS — L81.4 LENTIGINES: ICD-10-CM

## 2021-01-12 PROCEDURE — 17110 DESTRUCTION B9 LES UP TO 14: CPT | Mod: S$GLB,,, | Performed by: DERMATOLOGY

## 2021-01-12 PROCEDURE — 99999 PR PBB SHADOW E&M-EST. PATIENT-LVL III: ICD-10-PCS | Mod: PBBFAC,,, | Performed by: DERMATOLOGY

## 2021-01-12 PROCEDURE — 1126F PR PAIN SEVERITY QUANTIFIED, NO PAIN PRESENT: ICD-10-PCS | Mod: S$GLB,,, | Performed by: DERMATOLOGY

## 2021-01-12 PROCEDURE — 99203 PR OFFICE/OUTPT VISIT, NEW, LEVL III, 30-44 MIN: ICD-10-PCS | Mod: 25,S$GLB,, | Performed by: DERMATOLOGY

## 2021-01-12 PROCEDURE — 3008F PR BODY MASS INDEX (BMI) DOCUMENTED: ICD-10-PCS | Mod: CPTII,S$GLB,, | Performed by: DERMATOLOGY

## 2021-01-12 PROCEDURE — 17110 PR DESTRUCTION BENIGN LESIONS UP TO 14: ICD-10-PCS | Mod: S$GLB,,, | Performed by: DERMATOLOGY

## 2021-01-12 PROCEDURE — 3008F BODY MASS INDEX DOCD: CPT | Mod: CPTII,S$GLB,, | Performed by: DERMATOLOGY

## 2021-01-12 PROCEDURE — 99203 OFFICE O/P NEW LOW 30 MIN: CPT | Mod: 25,S$GLB,, | Performed by: DERMATOLOGY

## 2021-01-12 PROCEDURE — 1126F AMNT PAIN NOTED NONE PRSNT: CPT | Mod: S$GLB,,, | Performed by: DERMATOLOGY

## 2021-01-12 PROCEDURE — 99999 PR PBB SHADOW E&M-EST. PATIENT-LVL III: CPT | Mod: PBBFAC,,, | Performed by: DERMATOLOGY

## 2021-01-12 RX ORDER — SODIUM, POTASSIUM,MAG SULFATES 17.5-3.13G
1 SOLUTION, RECONSTITUTED, ORAL ORAL DAILY
Qty: 1 KIT | Refills: 0 | Status: CANCELLED | OUTPATIENT
Start: 2021-01-12 | End: 2021-01-14

## 2021-01-13 RX ORDER — SODIUM, POTASSIUM,MAG SULFATES 17.5-3.13G
1 SOLUTION, RECONSTITUTED, ORAL ORAL DAILY
Qty: 1 KIT | Refills: 0 | Status: SHIPPED | OUTPATIENT
Start: 2021-01-13 | End: 2021-01-15

## 2021-01-14 ENCOUNTER — TELEPHONE (OUTPATIENT)
Dept: GASTROENTEROLOGY | Facility: CLINIC | Age: 51
End: 2021-01-14

## 2021-01-14 RX ORDER — ACYCLOVIR 400 MG/1
TABLET ORAL
Qty: 180 TABLET | Refills: 1 | Status: SHIPPED | OUTPATIENT
Start: 2021-01-14 | End: 2022-04-03 | Stop reason: SDUPTHER

## 2021-02-15 ENCOUNTER — PATIENT MESSAGE (OUTPATIENT)
Dept: INTERNAL MEDICINE | Facility: CLINIC | Age: 51
End: 2021-02-15

## 2021-02-15 DIAGNOSIS — Z30.9 ENCOUNTER FOR CONTRACEPTIVE MANAGEMENT, UNSPECIFIED TYPE: ICD-10-CM

## 2021-02-17 ENCOUNTER — TELEPHONE (OUTPATIENT)
Dept: GASTROENTEROLOGY | Facility: CLINIC | Age: 51
End: 2021-02-17

## 2021-02-17 RX ORDER — NORGESTIMATE AND ETHINYL ESTRADIOL 7DAYSX3 LO
1 KIT ORAL DAILY
Qty: 90 TABLET | Refills: 0 | Status: SHIPPED | OUTPATIENT
Start: 2021-02-17 | End: 2021-05-07

## 2021-02-18 ENCOUNTER — TELEPHONE (OUTPATIENT)
Dept: GASTROENTEROLOGY | Facility: CLINIC | Age: 51
End: 2021-02-18

## 2021-02-22 ENCOUNTER — TELEPHONE (OUTPATIENT)
Dept: GASTROENTEROLOGY | Facility: CLINIC | Age: 51
End: 2021-02-22

## 2021-02-27 DIAGNOSIS — K21.9 GASTROESOPHAGEAL REFLUX DISEASE: ICD-10-CM

## 2021-03-01 RX ORDER — FAMOTIDINE 40 MG/1
TABLET, FILM COATED ORAL
Qty: 90 TABLET | Refills: 0 | Status: SHIPPED | OUTPATIENT
Start: 2021-03-01 | End: 2021-06-04

## 2021-03-28 ENCOUNTER — IMMUNIZATION (OUTPATIENT)
Dept: INTERNAL MEDICINE | Facility: CLINIC | Age: 51
End: 2021-03-28
Payer: COMMERCIAL

## 2021-03-28 DIAGNOSIS — Z23 NEED FOR VACCINATION: Primary | ICD-10-CM

## 2021-03-28 PROCEDURE — 0011A COVID-19, MRNA, LNP-S, PF, 100 MCG/0.5 ML DOSE VACCINE: CPT | Mod: PBBFAC | Performed by: FAMILY MEDICINE

## 2021-04-25 ENCOUNTER — IMMUNIZATION (OUTPATIENT)
Dept: INTERNAL MEDICINE | Facility: CLINIC | Age: 51
End: 2021-04-25
Payer: COMMERCIAL

## 2021-04-25 DIAGNOSIS — Z23 NEED FOR VACCINATION: Primary | ICD-10-CM

## 2021-04-25 PROCEDURE — 0012A COVID-19, MRNA, LNP-S, PF, 100 MCG/0.5 ML DOSE VACCINE: CPT | Mod: PBBFAC | Performed by: FAMILY MEDICINE

## 2021-07-07 ENCOUNTER — PATIENT OUTREACH (OUTPATIENT)
Dept: ADMINISTRATIVE | Facility: OTHER | Age: 51
End: 2021-07-07

## 2021-07-08 ENCOUNTER — OFFICE VISIT (OUTPATIENT)
Dept: OBSTETRICS AND GYNECOLOGY | Facility: CLINIC | Age: 51
End: 2021-07-08
Attending: OBSTETRICS & GYNECOLOGY
Payer: COMMERCIAL

## 2021-07-08 VITALS
WEIGHT: 218.06 LBS | DIASTOLIC BLOOD PRESSURE: 74 MMHG | HEIGHT: 68 IN | BODY MASS INDEX: 33.05 KG/M2 | SYSTOLIC BLOOD PRESSURE: 142 MMHG

## 2021-07-08 DIAGNOSIS — Z01.812 PRE-PROCEDURE LAB EXAM: ICD-10-CM

## 2021-07-08 DIAGNOSIS — R87.810 PAPANICOLAOU SMEAR OF CERVIX WITH POSITIVE HIGH RISK HUMAN PAPILLOMA VIRUS (HPV) TEST: Primary | ICD-10-CM

## 2021-07-08 DIAGNOSIS — B97.7 HIGH RISK HPV INFECTION: ICD-10-CM

## 2021-07-08 LAB
B-HCG UR QL: NEGATIVE
CTP QC/QA: YES

## 2021-07-08 PROCEDURE — 57456 ENDOCERV CURETTAGE W/SCOPE: CPT | Mod: S$GLB,,, | Performed by: OBSTETRICS & GYNECOLOGY

## 2021-07-08 PROCEDURE — 88305 TISSUE EXAM BY PATHOLOGIST: CPT | Performed by: PATHOLOGY

## 2021-07-08 PROCEDURE — 1126F PR PAIN SEVERITY QUANTIFIED, NO PAIN PRESENT: ICD-10-PCS | Mod: S$GLB,,, | Performed by: OBSTETRICS & GYNECOLOGY

## 2021-07-08 PROCEDURE — 81025 URINE PREGNANCY TEST: CPT | Mod: S$GLB,,, | Performed by: OBSTETRICS & GYNECOLOGY

## 2021-07-08 PROCEDURE — 99203 PR OFFICE/OUTPT VISIT, NEW, LEVL III, 30-44 MIN: ICD-10-PCS | Mod: 25,S$GLB,, | Performed by: OBSTETRICS & GYNECOLOGY

## 2021-07-08 PROCEDURE — 88305 TISSUE EXAM BY PATHOLOGIST: CPT | Mod: 26,,, | Performed by: PATHOLOGY

## 2021-07-08 PROCEDURE — 81025 POCT URINE PREGNANCY: ICD-10-PCS | Mod: S$GLB,,, | Performed by: OBSTETRICS & GYNECOLOGY

## 2021-07-08 PROCEDURE — 3008F BODY MASS INDEX DOCD: CPT | Mod: CPTII,S$GLB,, | Performed by: OBSTETRICS & GYNECOLOGY

## 2021-07-08 PROCEDURE — 88305 TISSUE EXAM BY PATHOLOGIST: ICD-10-PCS | Mod: 26,,, | Performed by: PATHOLOGY

## 2021-07-08 PROCEDURE — 99999 PR PBB SHADOW E&M-EST. PATIENT-LVL III: CPT | Mod: PBBFAC,,, | Performed by: OBSTETRICS & GYNECOLOGY

## 2021-07-08 PROCEDURE — 99999 PR PBB SHADOW E&M-EST. PATIENT-LVL III: ICD-10-PCS | Mod: PBBFAC,,, | Performed by: OBSTETRICS & GYNECOLOGY

## 2021-07-08 PROCEDURE — 3008F PR BODY MASS INDEX (BMI) DOCUMENTED: ICD-10-PCS | Mod: CPTII,S$GLB,, | Performed by: OBSTETRICS & GYNECOLOGY

## 2021-07-08 PROCEDURE — 1126F AMNT PAIN NOTED NONE PRSNT: CPT | Mod: S$GLB,,, | Performed by: OBSTETRICS & GYNECOLOGY

## 2021-07-08 PROCEDURE — 57456 PR COLPOSCOPY,CERVIX W/ADJ VAGINA, CURETTAG: ICD-10-PCS | Mod: S$GLB,,, | Performed by: OBSTETRICS & GYNECOLOGY

## 2021-07-08 PROCEDURE — 99203 OFFICE O/P NEW LOW 30 MIN: CPT | Mod: 25,S$GLB,, | Performed by: OBSTETRICS & GYNECOLOGY

## 2021-07-14 DIAGNOSIS — Z12.11 SPECIAL SCREENING FOR MALIGNANT NEOPLASM OF COLON: Primary | ICD-10-CM

## 2021-07-15 LAB
FINAL PATHOLOGIC DIAGNOSIS: NORMAL
GROSS: NORMAL
Lab: NORMAL

## 2021-12-20 ENCOUNTER — LAB VISIT (OUTPATIENT)
Dept: LAB | Facility: HOSPITAL | Age: 51
End: 2021-12-20
Attending: INTERNAL MEDICINE
Payer: COMMERCIAL

## 2021-12-20 ENCOUNTER — OFFICE VISIT (OUTPATIENT)
Dept: INTERNAL MEDICINE | Facility: CLINIC | Age: 51
End: 2021-12-20
Payer: COMMERCIAL

## 2021-12-20 VITALS — HEART RATE: 100 BPM | BODY MASS INDEX: 33.35 KG/M2 | WEIGHT: 219.38 LBS | OXYGEN SATURATION: 96 %

## 2021-12-20 DIAGNOSIS — M25.552 LEFT HIP PAIN: ICD-10-CM

## 2021-12-20 DIAGNOSIS — Z12.31 SCREENING MAMMOGRAM FOR BREAST CANCER: ICD-10-CM

## 2021-12-20 DIAGNOSIS — Z12.11 ENCOUNTER FOR SCREENING COLONOSCOPY: ICD-10-CM

## 2021-12-20 DIAGNOSIS — Z23 NEED FOR INFLUENZA VACCINATION: ICD-10-CM

## 2021-12-20 DIAGNOSIS — Z00.00 ANNUAL PHYSICAL EXAM: Primary | ICD-10-CM

## 2021-12-20 DIAGNOSIS — Z00.00 ANNUAL PHYSICAL EXAM: ICD-10-CM

## 2021-12-20 DIAGNOSIS — T30.0 FIRST DEGREE BURN: ICD-10-CM

## 2021-12-20 LAB
BASOPHILS # BLD AUTO: 0.09 K/UL (ref 0–0.2)
BASOPHILS NFR BLD: 1.1 % (ref 0–1.9)
DIFFERENTIAL METHOD: ABNORMAL
EOSINOPHIL # BLD AUTO: 0.2 K/UL (ref 0–0.5)
EOSINOPHIL NFR BLD: 2.6 % (ref 0–8)
ERYTHROCYTE [DISTWIDTH] IN BLOOD BY AUTOMATED COUNT: 12.5 % (ref 11.5–14.5)
HCT VFR BLD AUTO: 44.5 % (ref 37–48.5)
HGB BLD-MCNC: 13.9 G/DL (ref 12–16)
IMM GRANULOCYTES # BLD AUTO: 0.02 K/UL (ref 0–0.04)
IMM GRANULOCYTES NFR BLD AUTO: 0.3 % (ref 0–0.5)
LYMPHOCYTES # BLD AUTO: 1.8 K/UL (ref 1–4.8)
LYMPHOCYTES NFR BLD: 22.9 % (ref 18–48)
MCH RBC QN AUTO: 30.4 PG (ref 27–31)
MCHC RBC AUTO-ENTMCNC: 31.2 G/DL (ref 32–36)
MCV RBC AUTO: 97 FL (ref 82–98)
MONOCYTES # BLD AUTO: 0.4 K/UL (ref 0.3–1)
MONOCYTES NFR BLD: 5.4 % (ref 4–15)
NEUTROPHILS # BLD AUTO: 5.3 K/UL (ref 1.8–7.7)
NEUTROPHILS NFR BLD: 67.7 % (ref 38–73)
NRBC BLD-RTO: 0 /100 WBC
PLATELET # BLD AUTO: 350 K/UL (ref 150–450)
PMV BLD AUTO: 10.3 FL (ref 9.2–12.9)
RBC # BLD AUTO: 4.57 M/UL (ref 4–5.4)
WBC # BLD AUTO: 7.83 K/UL (ref 3.9–12.7)

## 2021-12-20 PROCEDURE — 90686 FLU VACCINE (QUAD) GREATER THAN OR EQUAL TO 3YO PRESERVATIVE FREE IM: ICD-10-PCS | Mod: S$GLB,,, | Performed by: INTERNAL MEDICINE

## 2021-12-20 PROCEDURE — 99999 PR PBB SHADOW E&M-EST. PATIENT-LVL III: ICD-10-PCS | Mod: PBBFAC,,, | Performed by: INTERNAL MEDICINE

## 2021-12-20 PROCEDURE — 90471 FLU VACCINE (QUAD) GREATER THAN OR EQUAL TO 3YO PRESERVATIVE FREE IM: ICD-10-PCS | Mod: S$GLB,,, | Performed by: INTERNAL MEDICINE

## 2021-12-20 PROCEDURE — 36415 COLL VENOUS BLD VENIPUNCTURE: CPT | Mod: PO | Performed by: INTERNAL MEDICINE

## 2021-12-20 PROCEDURE — 99999 PR PBB SHADOW E&M-EST. PATIENT-LVL III: CPT | Mod: PBBFAC,,, | Performed by: INTERNAL MEDICINE

## 2021-12-20 PROCEDURE — 85025 COMPLETE CBC W/AUTO DIFF WBC: CPT | Performed by: INTERNAL MEDICINE

## 2021-12-20 PROCEDURE — 99396 PREV VISIT EST AGE 40-64: CPT | Mod: 25,S$GLB,, | Performed by: INTERNAL MEDICINE

## 2021-12-20 PROCEDURE — 99396 PR PREVENTIVE VISIT,EST,40-64: ICD-10-PCS | Mod: 25,S$GLB,, | Performed by: INTERNAL MEDICINE

## 2021-12-20 PROCEDURE — 90686 IIV4 VACC NO PRSV 0.5 ML IM: CPT | Mod: S$GLB,,, | Performed by: INTERNAL MEDICINE

## 2021-12-20 PROCEDURE — 83036 HEMOGLOBIN GLYCOSYLATED A1C: CPT | Performed by: INTERNAL MEDICINE

## 2021-12-20 PROCEDURE — 80061 LIPID PANEL: CPT | Performed by: INTERNAL MEDICINE

## 2021-12-20 PROCEDURE — 90471 IMMUNIZATION ADMIN: CPT | Mod: S$GLB,,, | Performed by: INTERNAL MEDICINE

## 2021-12-20 PROCEDURE — 84443 ASSAY THYROID STIM HORMONE: CPT | Performed by: INTERNAL MEDICINE

## 2021-12-20 PROCEDURE — 80053 COMPREHEN METABOLIC PANEL: CPT | Performed by: INTERNAL MEDICINE

## 2021-12-21 LAB
ALBUMIN SERPL BCP-MCNC: 3.9 G/DL (ref 3.5–5.2)
ALP SERPL-CCNC: 55 U/L (ref 55–135)
ALT SERPL W/O P-5'-P-CCNC: 7 U/L (ref 10–44)
ANION GAP SERPL CALC-SCNC: 11 MMOL/L (ref 8–16)
AST SERPL-CCNC: 14 U/L (ref 10–40)
BILIRUB SERPL-MCNC: 0.4 MG/DL (ref 0.1–1)
BUN SERPL-MCNC: 11 MG/DL (ref 6–20)
CALCIUM SERPL-MCNC: 9.2 MG/DL (ref 8.7–10.5)
CHLORIDE SERPL-SCNC: 106 MMOL/L (ref 95–110)
CHOLEST SERPL-MCNC: 170 MG/DL (ref 120–199)
CHOLEST/HDLC SERPL: 2.5 {RATIO} (ref 2–5)
CO2 SERPL-SCNC: 19 MMOL/L (ref 23–29)
CREAT SERPL-MCNC: 0.8 MG/DL (ref 0.5–1.4)
EST. GFR  (AFRICAN AMERICAN): >60 ML/MIN/1.73 M^2
EST. GFR  (NON AFRICAN AMERICAN): >60 ML/MIN/1.73 M^2
ESTIMATED AVG GLUCOSE: 100 MG/DL (ref 68–131)
GLUCOSE SERPL-MCNC: 94 MG/DL (ref 70–110)
HBA1C MFR BLD: 5.1 % (ref 4–5.6)
HDLC SERPL-MCNC: 67 MG/DL (ref 40–75)
HDLC SERPL: 39.4 % (ref 20–50)
LDLC SERPL CALC-MCNC: 75.2 MG/DL (ref 63–159)
NONHDLC SERPL-MCNC: 103 MG/DL
POTASSIUM SERPL-SCNC: 4.3 MMOL/L (ref 3.5–5.1)
PROT SERPL-MCNC: 7.8 G/DL (ref 6–8.4)
SODIUM SERPL-SCNC: 136 MMOL/L (ref 136–145)
TRIGL SERPL-MCNC: 139 MG/DL (ref 30–150)
TSH SERPL DL<=0.005 MIU/L-ACNC: 2.73 UIU/ML (ref 0.4–4)

## 2022-04-03 ENCOUNTER — PATIENT MESSAGE (OUTPATIENT)
Dept: INTERNAL MEDICINE | Facility: CLINIC | Age: 52
End: 2022-04-03
Payer: COMMERCIAL

## 2022-04-27 DIAGNOSIS — B00.1 COLD SORE: ICD-10-CM

## 2022-04-27 RX ORDER — ACYCLOVIR 400 MG/1
TABLET ORAL
Qty: 180 TABLET | Refills: 1 | Status: SHIPPED | OUTPATIENT
Start: 2022-04-27 | End: 2023-01-31 | Stop reason: SDUPTHER

## 2022-04-27 NOTE — TELEPHONE ENCOUNTER
Refill Authorization Note   Dyan Arredondo  is requesting a refill authorization.  Brief Assessment and Rationale for Refill:  Approve     Medication Therapy Plan:       Medication Reconciliation Completed: No   Comments:     No Care Gaps recommended.     Note composed:4:40 PM 04/27/2022

## 2022-04-27 NOTE — TELEPHONE ENCOUNTER
No new care gaps identified.  Powered by [email or web address]. Reference number: [id number].   4/27/2022 11:36:06 AM CDT

## 2022-05-31 ENCOUNTER — PATIENT MESSAGE (OUTPATIENT)
Dept: ADMINISTRATIVE | Facility: HOSPITAL | Age: 52
End: 2022-05-31
Payer: COMMERCIAL

## 2022-07-01 DIAGNOSIS — K21.9 GASTROESOPHAGEAL REFLUX DISEASE: ICD-10-CM

## 2022-07-01 RX ORDER — FAMOTIDINE 40 MG/1
TABLET, FILM COATED ORAL
Qty: 90 TABLET | Refills: 1 | Status: SHIPPED | OUTPATIENT
Start: 2022-07-01 | End: 2023-01-31 | Stop reason: SDUPTHER

## 2022-07-01 NOTE — TELEPHONE ENCOUNTER
Refill Authorization Note   Dyan Arredondo  is requesting a refill authorization.  Brief Assessment and Rationale for Refill:  Approve     Medication Therapy Plan:       Medication Reconciliation Completed: No   Comments:     No Care Gaps recommended.     Note composed:8:28 AM 07/01/2022

## 2022-07-01 NOTE — TELEPHONE ENCOUNTER
No new care gaps identified.  [facility] Embedded Care Gaps. Reference number: [id number]. 7/01/2022   12:29:51 AM EVELIAT

## 2022-07-09 ENCOUNTER — PATIENT MESSAGE (OUTPATIENT)
Dept: INTERNAL MEDICINE | Facility: CLINIC | Age: 52
End: 2022-07-09
Payer: COMMERCIAL

## 2022-08-13 ENCOUNTER — OFFICE VISIT (OUTPATIENT)
Dept: URGENT CARE | Facility: CLINIC | Age: 52
End: 2022-08-13
Payer: COMMERCIAL

## 2022-08-13 VITALS
BODY MASS INDEX: 33.34 KG/M2 | TEMPERATURE: 99 F | HEIGHT: 68 IN | RESPIRATION RATE: 18 BRPM | WEIGHT: 220 LBS | HEART RATE: 97 BPM | SYSTOLIC BLOOD PRESSURE: 147 MMHG | DIASTOLIC BLOOD PRESSURE: 87 MMHG

## 2022-08-13 DIAGNOSIS — L23.9 ALLERGIC DERMATITIS: ICD-10-CM

## 2022-08-13 DIAGNOSIS — L03.90 CELLULITIS, UNSPECIFIED CELLULITIS SITE: Primary | ICD-10-CM

## 2022-08-13 PROCEDURE — 3079F DIAST BP 80-89 MM HG: CPT | Mod: CPTII,S$GLB,, | Performed by: NURSE PRACTITIONER

## 2022-08-13 PROCEDURE — 1160F PR REVIEW ALL MEDS BY PRESCRIBER/CLIN PHARMACIST DOCUMENTED: ICD-10-PCS | Mod: CPTII,S$GLB,, | Performed by: NURSE PRACTITIONER

## 2022-08-13 PROCEDURE — 99203 OFFICE O/P NEW LOW 30 MIN: CPT | Mod: 25,S$GLB,, | Performed by: NURSE PRACTITIONER

## 2022-08-13 PROCEDURE — 99203 PR OFFICE/OUTPT VISIT, NEW, LEVL III, 30-44 MIN: ICD-10-PCS | Mod: 25,S$GLB,, | Performed by: NURSE PRACTITIONER

## 2022-08-13 PROCEDURE — 3008F PR BODY MASS INDEX (BMI) DOCUMENTED: ICD-10-PCS | Mod: CPTII,S$GLB,, | Performed by: NURSE PRACTITIONER

## 2022-08-13 PROCEDURE — 3008F BODY MASS INDEX DOCD: CPT | Mod: CPTII,S$GLB,, | Performed by: NURSE PRACTITIONER

## 2022-08-13 PROCEDURE — 3077F SYST BP >= 140 MM HG: CPT | Mod: CPTII,S$GLB,, | Performed by: NURSE PRACTITIONER

## 2022-08-13 PROCEDURE — 3079F PR MOST RECENT DIASTOLIC BLOOD PRESSURE 80-89 MM HG: ICD-10-PCS | Mod: CPTII,S$GLB,, | Performed by: NURSE PRACTITIONER

## 2022-08-13 PROCEDURE — 1159F MED LIST DOCD IN RCRD: CPT | Mod: CPTII,S$GLB,, | Performed by: NURSE PRACTITIONER

## 2022-08-13 PROCEDURE — 96372 THER/PROPH/DIAG INJ SC/IM: CPT | Mod: S$GLB,,, | Performed by: FAMILY MEDICINE

## 2022-08-13 PROCEDURE — 1160F RVW MEDS BY RX/DR IN RCRD: CPT | Mod: CPTII,S$GLB,, | Performed by: NURSE PRACTITIONER

## 2022-08-13 PROCEDURE — 1159F PR MEDICATION LIST DOCUMENTED IN MEDICAL RECORD: ICD-10-PCS | Mod: CPTII,S$GLB,, | Performed by: NURSE PRACTITIONER

## 2022-08-13 PROCEDURE — 3077F PR MOST RECENT SYSTOLIC BLOOD PRESSURE >= 140 MM HG: ICD-10-PCS | Mod: CPTII,S$GLB,, | Performed by: NURSE PRACTITIONER

## 2022-08-13 PROCEDURE — 96372 PR INJECTION,THERAP/PROPH/DIAG2ST, IM OR SUBCUT: ICD-10-PCS | Mod: S$GLB,,, | Performed by: FAMILY MEDICINE

## 2022-08-13 RX ORDER — EPINEPHRINE 0.3 MG/.3ML
INJECTION SUBCUTANEOUS
Qty: 1 EACH | Refills: 0 | Status: SHIPPED | OUTPATIENT
Start: 2022-08-13 | End: 2024-02-20

## 2022-08-13 RX ORDER — CEPHALEXIN 500 MG/1
500 CAPSULE ORAL EVERY 12 HOURS
Qty: 14 CAPSULE | Refills: 0 | Status: SHIPPED | OUTPATIENT
Start: 2022-08-13 | End: 2022-08-20

## 2022-08-13 RX ORDER — HYDROCORTISONE 25 MG/G
CREAM TOPICAL 2 TIMES DAILY
Qty: 28 G | Refills: 0 | Status: SHIPPED | OUTPATIENT
Start: 2022-08-13 | End: 2023-01-31

## 2022-08-13 RX ORDER — DEXAMETHASONE SODIUM PHOSPHATE 100 MG/10ML
10 INJECTION INTRAMUSCULAR; INTRAVENOUS
Status: COMPLETED | OUTPATIENT
Start: 2022-08-13 | End: 2022-08-13

## 2022-08-13 RX ADMIN — DEXAMETHASONE SODIUM PHOSPHATE 10 MG: 100 INJECTION INTRAMUSCULAR; INTRAVENOUS at 04:08

## 2022-08-13 NOTE — PROGRESS NOTES
"Subjective:       Patient ID: Dyan Arredondo is a 51 y.o. female.    Vitals:  height is 5' 8" (1.727 m) and weight is 99.8 kg (220 lb). Her oral temperature is 99.2 °F (37.3 °C). Her blood pressure is 147/87 (abnormal) and her pulse is 97. Her respiration is 18.     Chief Complaint: Rash (I believe it is an allergic reaction to an insect bite.  It's been worsening.  Today is day 10.  I believe I need steroids and possibly antibiotics.  I prefer Keflex or its generic. - Entered by patient)    52 y/o female presents to  today with c/o itchy rash to face, neck, and chest area. Reports insect bite 2 weeks ago, in which she believes is causing this new rash. Her face feels tight and puffy-more so to the right side. She is concerned of infection. Has had similar reactions to insect bites in past. Denies fever.     Rash  This is a new problem. The current episode started 1 to 4 weeks ago. The problem has been gradually worsening since onset. The affected locations include the face, chest and neck. The rash is characterized by redness, burning, itchiness and pain. She was exposed to a spider bite and an insect bite/sting. Past treatments include antihistamine (otc meds). The treatment provided no relief.       Skin: Positive for rash.   Allergic/Immunologic: Positive for itching.       Objective:      Physical Exam   Constitutional: She is oriented to person, place, and time.  Non-toxic appearance. She does not appear ill. No distress.   HENT:   Head: Normocephalic.   Nose: Nose normal.   Mouth/Throat: Mucous membranes are moist. Oropharynx is clear.      Comments: Airway patent  Eyes: Right eye exhibits no discharge. Left eye exhibits no discharge.   Neck: No neck rigidity present.   Cardiovascular: Normal rate.   Pulmonary/Chest: Effort normal.   Abdominal: Normal appearance.   Musculoskeletal: Normal range of motion.         General: Normal range of motion.   Neurological: She is alert and oriented to person, place, " and time.   Skin: Skin is warm, dry, not diaphoretic and rash. Capillary refill takes less than 2 seconds.         Comments: Allergic dermatitis to face, neck, and upper trunk, with increased erythema to right side of face with mild induration. No drainage.    Psychiatric: Her behavior is normal.   Nursing note and vitals reviewed.        Assessment:       1. Cellulitis, unspecified cellulitis site    2. Allergic dermatitis          Plan:         Cellulitis, unspecified cellulitis site  -     cephALEXin (KEFLEX) 500 MG capsule; Take 1 capsule (500 mg total) by mouth every 12 (twelve) hours. for 7 days  Dispense: 14 capsule; Refill: 0    Allergic dermatitis  -     hydrocortisone 2.5 % cream; Apply topically 2 (two) times daily.  Dispense: 28 g; Refill: 0  -     dexamethasone injection 10 mg  -     EPINEPHrine (EPIPEN 2-DAVID) 0.3 mg/0.3 mL AtIn; Give self one auto-injection as needed for severe allergic reaction, then seek emergency care  Dispense: 1 each; Refill: 0      Patient Instructions   Keep skin clean and dry  Avoid scratching   Avoid heat (like hot showers or humidity) that may worsen rash  Use gentle face soaps/products

## 2022-08-13 NOTE — PATIENT INSTRUCTIONS
Keep skin clean and dry  Avoid scratching   Avoid heat (like hot showers or humidity) that may worsen rash  Use gentle face soaps/products

## 2022-08-24 ENCOUNTER — PATIENT MESSAGE (OUTPATIENT)
Dept: ADMINISTRATIVE | Facility: HOSPITAL | Age: 52
End: 2022-08-24
Payer: COMMERCIAL

## 2022-08-27 ENCOUNTER — CLINICAL SUPPORT (OUTPATIENT)
Dept: URGENT CARE | Facility: CLINIC | Age: 52
End: 2022-08-27
Payer: COMMERCIAL

## 2022-08-27 VITALS
TEMPERATURE: 98 F | WEIGHT: 220 LBS | RESPIRATION RATE: 17 BRPM | HEIGHT: 68 IN | BODY MASS INDEX: 33.34 KG/M2 | OXYGEN SATURATION: 98 % | SYSTOLIC BLOOD PRESSURE: 142 MMHG | HEART RATE: 101 BPM | DIASTOLIC BLOOD PRESSURE: 92 MMHG

## 2022-08-27 DIAGNOSIS — L23.9 ALLERGIC DERMATITIS: Primary | ICD-10-CM

## 2022-08-27 PROCEDURE — 99213 OFFICE O/P EST LOW 20 MIN: CPT | Mod: S$GLB,,, | Performed by: NURSE PRACTITIONER

## 2022-08-27 PROCEDURE — 99213 PR OFFICE/OUTPT VISIT, EST, LEVL III, 20-29 MIN: ICD-10-PCS | Mod: S$GLB,,, | Performed by: NURSE PRACTITIONER

## 2022-08-27 RX ORDER — PREDNISONE 10 MG/1
TABLET ORAL
Qty: 7 TABLET | Refills: 0 | Status: SHIPPED | OUTPATIENT
Start: 2022-08-27 | End: 2022-09-01

## 2022-08-27 RX ORDER — TRIAMCINOLONE ACETONIDE 0.25 MG/G
CREAM TOPICAL 2 TIMES DAILY
Qty: 15 G | Refills: 0 | Status: SHIPPED | OUTPATIENT
Start: 2022-08-27 | End: 2022-09-03

## 2022-08-27 NOTE — PROGRESS NOTES
"Subjective:       Patient ID: Dyan Arredondo is a 51 y.o. female.    Vitals:  height is 5' 8" (1.727 m) and weight is 99.8 kg (220 lb). Her temperature is 98.3 °F (36.8 °C). Her blood pressure is 142/92 (abnormal) and her pulse is 101. Her respiration is 17 and oxygen saturation is 98%.     Chief Complaint: Rash (Follow-up visit.  Insect bite/rash "thing" improved to almost 100% after my last Urgent Care visit on Aug 13th.  Starting yesterday, it has returned. - Entered by patient)    Patient presents to the clinic with a rash / bug bite that had gone away but re appeared x yesterday  Provider note below:  This is a 51 y.o. female who presents today with a chief complaint of rash on her neck that started 2 weeks ago, patient reports she was seen here and was given steroid shot as she thought she might be allergic to insect bite, patient reports she was also treated with the antibiotics, patient reports her symptoms resolved almost % but the rash reappear yesterday, patient reports  she is getting redness on her face also, denies throat closing,  denies use of any new products or detergent, denies pain with rash, denies fever, body aches or chills, denies cough, wheezing or shortness of breath, denies nausea, vomiting, diarrhea or abdominal pain, denies chest pain or dizziness positional lightheadedness, denies sore throat or trouble swallowing, denies loss of taste or smell, or any other symptoms            Rash  This is a new problem. The current episode started yesterday. The problem has been gradually worsening since onset. The affected locations include the neck. The rash is characterized by itchiness, redness and scaling. She was exposed to an insect bite/sting. Pertinent negatives include no fatigue. Past treatments include oral steroids and topical steroids (dihydramine). The treatment provided no relief.   Constitution: Negative for fatigue.   Skin:  Positive for rash. Negative for erythema.   "   Objective:      Physical Exam   Constitutional: She is oriented to person, place, and time. She appears well-developed.   HENT:   Head: Normocephalic and atraumatic. Head is without abrasion, without contusion and without laceration.   Ears:   Right Ear: External ear normal.   Left Ear: External ear normal.   Nose: Nose normal.   Mouth/Throat: Uvula is midline, oropharynx is clear and moist and mucous membranes are normal. No oropharyngeal exudate, posterior oropharyngeal edema, posterior oropharyngeal erythema, tonsillar abscesses or cobblestoning.   No oropharyngeal erythema, edema or exudate noted, uvula midline, airway patent      Comments: No oropharyngeal erythema, edema or exudate noted, uvula midline, airway patent  Eyes: Conjunctivae, EOM and lids are normal. Pupils are equal, round, and reactive to light.   Neck: Trachea normal and phonation normal. Neck supple.   Cardiovascular: Normal rate, regular rhythm and normal heart sounds.   Pulmonary/Chest: Effort normal and breath sounds normal. No stridor. No respiratory distress.   Musculoskeletal: Normal range of motion.         General: Normal range of motion.   Neurological: She is alert and oriented to person, place, and time.   Skin: Skin is warm, dry, intact and rash (erytematous rash to neck, no swelling or tenderness noted, no vesicular or pustular lesion noted, no active drianage or discharge noted, no facial swelling noted). Capillary refill takes less than 2 seconds. No abrasion, No burn, No bruising, No erythema and No ecchymosis        Psychiatric: Her speech is normal and behavior is normal. Judgment and thought content normal.   Nursing note and vitals reviewed.          Patient in no acute distress.  Vitals reassuring.  Discussed results/diagnosis/plan in depth with patient in clinic. Strict precautions given to patient to monitor for worsening signs and symptoms. Advised to follow up with primary.All questions answered. Strict ER precautions  given. If your symptoms worsens or fail to improve you should go to the Emergency Room. Discharge and follow-up instructions given verbally/printed. Discharge and follow-up instructions discussed with the patient who expressed understanding and willingness to comply with my recommendations.Patient voiced understanding and in agreement with current treatment plan.     Please be advised this text was dictated with [employer] software and may contain errors due to translation.    Assessment:       1. Allergic dermatitis          Plan:         Allergic dermatitis  -     Ambulatory referral/consult to Allergy    Other orders  -     triamcinolone acetonide 0.025% (KENALOG) 0.025 % cream; Apply topically 2 (two) times daily. for 7 days  Dispense: 15 g; Refill: 0  -     predniSONE (DELTASONE) 10 MG tablet; Take 2 tablets (20 mg total) by mouth once daily for 2 days, THEN 1 tablet (10 mg total) once daily for 3 days.  Dispense: 7 tablet; Refill: 0         Medical Decision Making:   Urgent Care Management:  Patient in no acute distress.  Vitals reassuring.  On exam, patient is nontoxic appearing and afebrile.  Airway patent.  No oropharyngeal erythema, or edema noted.  Patient was given a steroid injection 2 weeks ago.  Patient reported resolution of symptoms after the medication prescribed previously.  Restarted with rash yesterday.Risks vs benefits of steroid use and recommendations discussed with pt. Pt understands and would like to proceed with oral and topical steroids.  Red flags discussed with patient in detail.  I referred patient to allergist for further evaluation.Patient voiced understanding and in agreement with current treatment plan.             Patient Instructions                                                        Allergic Reaction   If your condition worsens or fails to improve we recommend that you receive another evaluation at the ER immediately or contact your PCP to discuss your concerns or return here.  You must understand that you've received an urgent care treatment only and that you may be released before all your medical problems are known or treated. You the patient will arrange for followup care as instructed.   Increase fluids and rest are important  Please take over the counter Pepcid or Zantac as directed for the next 24-72hours as needed.  Please take Claritin or Zyrtec or Allegra (24 hours) twice a day.  You can add Benadryl or Hydroxyzine as needed for itching, however these may make you drowsy, so do not  drive or operate heavy equipment or machinery while taking these medications.    If you were given a steroid shot in the clinic and have also been given a prescription for a steroid such as Prednisone or a Medrol Dose Pack, please begin taking them tomorrow. If you received a steroid shot today - this can elevate your blood pressure, elevate your blood sugar, water weight gain, nervous energy, redness to the face and dimpling of the skin where the shot goes in.      If you develop additional symptoms such as tongue swelling or trouble breathing go immediately to the ER.     If you were stung by an insect rarely do these get infected. However if it gets painful, increase redness or drainage you need to be rechecked either here or call your PCP.    In the future make sure to keep benadryl with you or an Epi-pen if you were prescribed one.

## 2022-08-27 NOTE — PATIENT INSTRUCTIONS
Allergic Reaction   If your condition worsens or fails to improve we recommend that you receive another evaluation at the ER immediately or contact your PCP to discuss your concerns or return here. You must understand that you've received an urgent care treatment only and that you may be released before all your medical problems are known or treated. You the patient will arrange for followup care as instructed.   Increase fluids and rest are important  Please take over the counter Pepcid or Zantac as directed for the next 24-72hours as needed.  Please take Claritin or Zyrtec or Allegra (24 hours) twice a day.  You can add Benadryl or Hydroxyzine as needed for itching, however these may make you drowsy, so do not  drive or operate heavy equipment or machinery while taking these medications.    If you were given a steroid shot in the clinic and have also been given a prescription for a steroid such as Prednisone or a Medrol Dose Pack, please begin taking them tomorrow. If you received a steroid shot today - this can elevate your blood pressure, elevate your blood sugar, water weight gain, nervous energy, redness to the face and dimpling of the skin where the shot goes in.      If you develop additional symptoms such as tongue swelling or trouble breathing go immediately to the ER.     If you were stung by an insect rarely do these get infected. However if it gets painful, increase redness or drainage you need to be rechecked either here or call your PCP.    In the future make sure to keep benadryl with you or an Epi-pen if you were prescribed one.

## 2022-08-29 ENCOUNTER — TELEPHONE (OUTPATIENT)
Dept: ALLERGY | Facility: CLINIC | Age: 52
End: 2022-08-29
Payer: COMMERCIAL

## 2022-08-29 NOTE — TELEPHONE ENCOUNTER
----- Message from Sully Lagos sent at 8/29/2022 11:01 AM CDT -----  Patient has an appointment on 10/25/22, patient states she has been to the ER twice in two weeks and is asking if she can be seen sooner than October

## 2022-09-09 ENCOUNTER — LAB VISIT (OUTPATIENT)
Dept: LAB | Facility: HOSPITAL | Age: 52
End: 2022-09-09
Attending: STUDENT IN AN ORGANIZED HEALTH CARE EDUCATION/TRAINING PROGRAM
Payer: COMMERCIAL

## 2022-09-09 ENCOUNTER — OFFICE VISIT (OUTPATIENT)
Dept: ALLERGY | Facility: CLINIC | Age: 52
End: 2022-09-09
Payer: COMMERCIAL

## 2022-09-09 VITALS
DIASTOLIC BLOOD PRESSURE: 87 MMHG | BODY MASS INDEX: 35.9 KG/M2 | OXYGEN SATURATION: 99 % | SYSTOLIC BLOOD PRESSURE: 134 MMHG | HEART RATE: 93 BPM | WEIGHT: 236.13 LBS

## 2022-09-09 DIAGNOSIS — L30.9 ECZEMA, UNSPECIFIED TYPE: Primary | ICD-10-CM

## 2022-09-09 DIAGNOSIS — L30.9 ECZEMA, UNSPECIFIED TYPE: ICD-10-CM

## 2022-09-09 LAB — IGE SERPL-ACNC: 50 IU/ML (ref 0–100)

## 2022-09-09 PROCEDURE — 1159F PR MEDICATION LIST DOCUMENTED IN MEDICAL RECORD: ICD-10-PCS | Mod: CPTII,S$GLB,, | Performed by: STUDENT IN AN ORGANIZED HEALTH CARE EDUCATION/TRAINING PROGRAM

## 2022-09-09 PROCEDURE — 86003 ALLG SPEC IGE CRUDE XTRC EA: CPT | Mod: 59 | Performed by: STUDENT IN AN ORGANIZED HEALTH CARE EDUCATION/TRAINING PROGRAM

## 2022-09-09 PROCEDURE — 99205 PR OFFICE/OUTPT VISIT, NEW, LEVL V, 60-74 MIN: ICD-10-PCS | Mod: S$GLB,,, | Performed by: STUDENT IN AN ORGANIZED HEALTH CARE EDUCATION/TRAINING PROGRAM

## 2022-09-09 PROCEDURE — 1159F MED LIST DOCD IN RCRD: CPT | Mod: CPTII,S$GLB,, | Performed by: STUDENT IN AN ORGANIZED HEALTH CARE EDUCATION/TRAINING PROGRAM

## 2022-09-09 PROCEDURE — 82785 ASSAY OF IGE: CPT | Performed by: STUDENT IN AN ORGANIZED HEALTH CARE EDUCATION/TRAINING PROGRAM

## 2022-09-09 PROCEDURE — 3008F BODY MASS INDEX DOCD: CPT | Mod: CPTII,S$GLB,, | Performed by: STUDENT IN AN ORGANIZED HEALTH CARE EDUCATION/TRAINING PROGRAM

## 2022-09-09 PROCEDURE — 3075F PR MOST RECENT SYSTOLIC BLOOD PRESS GE 130-139MM HG: ICD-10-PCS | Mod: CPTII,S$GLB,, | Performed by: STUDENT IN AN ORGANIZED HEALTH CARE EDUCATION/TRAINING PROGRAM

## 2022-09-09 PROCEDURE — 3079F DIAST BP 80-89 MM HG: CPT | Mod: CPTII,S$GLB,, | Performed by: STUDENT IN AN ORGANIZED HEALTH CARE EDUCATION/TRAINING PROGRAM

## 2022-09-09 PROCEDURE — 86003 ALLG SPEC IGE CRUDE XTRC EA: CPT | Performed by: STUDENT IN AN ORGANIZED HEALTH CARE EDUCATION/TRAINING PROGRAM

## 2022-09-09 PROCEDURE — 36415 COLL VENOUS BLD VENIPUNCTURE: CPT | Performed by: STUDENT IN AN ORGANIZED HEALTH CARE EDUCATION/TRAINING PROGRAM

## 2022-09-09 PROCEDURE — 3079F PR MOST RECENT DIASTOLIC BLOOD PRESSURE 80-89 MM HG: ICD-10-PCS | Mod: CPTII,S$GLB,, | Performed by: STUDENT IN AN ORGANIZED HEALTH CARE EDUCATION/TRAINING PROGRAM

## 2022-09-09 PROCEDURE — 99999 PR PBB SHADOW E&M-EST. PATIENT-LVL III: ICD-10-PCS | Mod: PBBFAC,,, | Performed by: STUDENT IN AN ORGANIZED HEALTH CARE EDUCATION/TRAINING PROGRAM

## 2022-09-09 PROCEDURE — 1160F PR REVIEW ALL MEDS BY PRESCRIBER/CLIN PHARMACIST DOCUMENTED: ICD-10-PCS | Mod: CPTII,S$GLB,, | Performed by: STUDENT IN AN ORGANIZED HEALTH CARE EDUCATION/TRAINING PROGRAM

## 2022-09-09 PROCEDURE — 3008F PR BODY MASS INDEX (BMI) DOCUMENTED: ICD-10-PCS | Mod: CPTII,S$GLB,, | Performed by: STUDENT IN AN ORGANIZED HEALTH CARE EDUCATION/TRAINING PROGRAM

## 2022-09-09 PROCEDURE — 3075F SYST BP GE 130 - 139MM HG: CPT | Mod: CPTII,S$GLB,, | Performed by: STUDENT IN AN ORGANIZED HEALTH CARE EDUCATION/TRAINING PROGRAM

## 2022-09-09 PROCEDURE — 1160F RVW MEDS BY RX/DR IN RCRD: CPT | Mod: CPTII,S$GLB,, | Performed by: STUDENT IN AN ORGANIZED HEALTH CARE EDUCATION/TRAINING PROGRAM

## 2022-09-09 PROCEDURE — 99999 PR PBB SHADOW E&M-EST. PATIENT-LVL III: CPT | Mod: PBBFAC,,, | Performed by: STUDENT IN AN ORGANIZED HEALTH CARE EDUCATION/TRAINING PROGRAM

## 2022-09-09 PROCEDURE — 99205 OFFICE O/P NEW HI 60 MIN: CPT | Mod: S$GLB,,, | Performed by: STUDENT IN AN ORGANIZED HEALTH CARE EDUCATION/TRAINING PROGRAM

## 2022-09-09 RX ORDER — DESONIDE 0.5 MG/ML
LOTION TOPICAL 2 TIMES DAILY
Qty: 236 ML | Refills: 1 | Status: SHIPPED | OUTPATIENT
Start: 2022-09-09 | End: 2023-01-31

## 2022-09-09 NOTE — PATIENT INSTRUCTIONS
Testing  Blood work for allergy testing today       Check [facility] in one week for results or call [phone]       Contact me with questions or concerns       I will contact you if anything needs immediate attention.        Treatment    DESONIDE LOTION to rashy areas twice a day    Elta sunscreen  Avoid any anti-wrinkle or brightening creams on face for now  Continue same eye regimen

## 2022-09-09 NOTE — PROGRESS NOTES
Allergy Clinic Note  [facility]    This note was created by combination of typed  and M-Modal dictation. Transcription errors may be present.  If there are any questions, please contact me.    Subjective:      Patient ID: Dyan Arredondo is a 51 y.o. female.    Chief Complaint: Allergic Reaction      Referring Provider: Leana Comer    History of Present Illness: Dyan Arredondo is a 51 y.o. female referred following [facility] urgent care visit for dermatitis thought to be allergic.    Related medications and other interventions  EpiPen  Flonase  TMC 0.025 cream  S/p systemic steroids x2    9/9/2022:  At initial visit client reported 2 episodes of mildly pruritic rash on her upper chest extending to her neck.  Both episodes lasted more than 5 days.  The rash is mildly pruritic in did spread and change shape while present.  Client reports a long history of sensitive skin, especially to heat.  She needs to take cold showers and cold pedicure is a in order to avoid her skin turning red.  She thinks it turns a solid red it is not entirely sure.  This is nonpruritic.  Patient also has chronically pink cheeks without pustules.  Dust as a possible precipitant, as the 1st episode occurred while she was helping at her mother's house and the 2nd occurred while getting things out of storage for Halloween. She is also concerned about mushrooms. She has been given systemic steroids on 2 occasions and also triamcinolone which she barely uses.  She has never had allergy testing        MEDICAL HISTORY      Significant past medical history:   ENT surgery:  sinus surgery, turbinate reduction    Significant family history:  mother with allergies  Exposures: 2 cats in bed; minimal mold near A/C duct.  No smoke.  Smoking Hx:  Client  reports that she has never smoked. She has never been exposed to tobacco smoke. She has never used smokeless tobacco.      Asthma: No  Eczema: Yes (new Dx  today)  Rhinitis: Yes  Drug allergy/intolerance: multiple  Venom allergy: denies  Latex allergy:  denies    Patient Active Problem List   Diagnosis    GERD (gastroesophageal reflux disease)    Cold sore    Numbness of foot    High risk HPV infection     Medication List with Changes/Refills   New Medications    DESONIDE (DESOWEN) 0.05 % LOTION    Apply topically 2 (two) times daily.       Start Date: 9/9/2022  End Date: --   Current Medications    ACYCLOVIR (ZOVIRAX) 400 MG TABLET    TAKE 1 TABLET BY MOUTH TWICE A DAY. START AT ONSET OF SYMPTOMS. TAKE FOR 5 DAYS TOTAL.       Start Date: 4/27/2022 End Date: --    DIPHENOXYLATE-ATROPINE 2.5-0.025 MG (LOMOTIL) 2.5-0.025 MG PER TABLET    Take 1 tablet by mouth 4 (four) times daily as needed for Diarrhea.       Start Date: 4/4/2022  End Date: --    EPINEPHRINE (EPIPEN 2-DAVID) 0.3 MG/0.3 ML ATIN    Give self one auto-injection as needed for severe allergic reaction, then seek emergency care       Start Date: 8/13/2022 End Date: --    FAMOTIDINE (PEPCID) 40 MG TABLET    TAKE 1 TABLET BY MOUTH EVERY DAY       Start Date: 7/1/2022  End Date: --    FLUTICASONE PROPIONATE (FLONASE) 50 MCG/ACTUATION NASAL SPRAY    1 spray (50 mcg total) by Each Nostril route once daily.       Start Date: 4/4/2022  End Date: --    HYDROCORTISONE 2.5 % CREAM    Apply topically 2 (two) times daily.       Start Date: 8/13/2022 End Date: --    NORGESTIMATE-ETHINYL ESTRADIOL (TRI-LO-ESTARYLLA) 0.18/0.215/0.25 MG-25 MCG TABLET    Take 1 tablet by mouth once daily.       Start Date: 4/4/2022  End Date: --    TRIAMCINOLONE ACETONIDE 0.025% (KENALOG) 0.025 % CREAM    Apply topically 2 (two) times daily. for 7 days       Start Date: 8/27/2022 End Date: 9/3/2022           REVIEW OF SYSTEMS      CONST: no F/C/NS, no unintentional weight changes  NEURO:  no tremor, no weakness  EYES: no discharge, no pruritus, no erythema  EARS: no hearing loss, no sensation of fullness  NOSE: no congestion, no rhinorrhea, no  sneezing  PULM:  no SOB, no wheezing, no cough  CV: no CP, no palpitations, no leg swelling  GI:  no abdominal pain, no blood in stool  :  no dysuria, no hematuria  DERM: + rashes, no skin breaks    Objective:     Physical Exam:     /87 (BP Location: Right arm, Patient Position: Sitting, BP Method: Large (Automatic))   Pulse 93   Wt 107.1 kg (236 lb 1.8 oz)   SpO2 99%   BMI 35.90 kg/m²   GEN: Awake and alert, no distress  DERM: No rashes or flushing  EYE:  No occular discharge  HEENT: No nasal discharge, no hoarseness  PULM: Normal work of breathing, no cough  NEURO:  No focal deficit, speech fluent and logical  PSYCH: appropriate affect, normal behavior  SKIN: neck-->scattered pinpoint pink papules;   under chin--> larger red papules coalescing; started to fade after removing her mask;  Chin--> solid red  Cheeks--> pink b/l, no pustules, extremely dry patch near nasolabial fold  Neck--> similar dry patches with lighter pink patches        Allergy Testing      Immunocaps: ordered      Lab results   Eosinophil count 200 on CBC 12/20/2021        Imaging and other diagnostics            Medical records review   09/09/2022 I reviewed urgent care note of 08/27/2022.  He presented with a papular rash on her neck.  She was treated initially with steroid shot and antibiotics 08/13/2022, but the rash reappeared her neck and appeared to be spreading to her face.  No other symptoms.  Physical exam describes erythematous rash to neck there is a photo showing 3 papules or small nodules at the base of her neck with irregular borders.  She was treated with topical and oral steroids.             ASSESSMENT & PLAN       Diagnoses:     Dyan Arredondo is a 51 y.o. female. with  1. Eczema, unspecified type          Medical Decision making:   Ms Arredondo is presenting with an eczematous rash lasting days up to 2 weeks on her chest, neck and face.  It's possibly related to dust or other airborne allergy.  Nothing in history  suggest food allergy.  Diagnostically, will screen for airborne allergies.   Therapeutically, recommend desonide to rashy areas.  Please see patient instructions      Eczema, unspecified type  -     desonide (DESOWEN) 0.05 % lotion; Apply topically 2 (two) times daily.  Dispense: 236 mL; Refill: 1  -     IgE; Future; Expected date: 09/09/2022  -     Dermatophagoides Cadet; Future; Expected date: 09/09/2022  -     Dermatophagoides Pteronyssinus; Future; Expected date: 09/09/2022  -     [place]; Future; Expected date: 09/09/2022  -     Solomon; Future; Expected date: 09/09/2022  -     [place]; Future; Expected date: 09/09/2022  -     English Plantain; Future; Expected date: 09/09/2022  -     Stollings Pecan; Future; Expected date: 09/09/2022  -     Pecan; Future; Expected date: 09/09/2022  -     Ragweed; Future; Expected date: 09/09/2022  -     Alternaria; Future; Expected date: 09/09/2022  -     Aspergillus; Future; Expected date: 09/09/2022  -     Cat; Future; Expected date: 09/09/2022  -     Cockroach; Future; Expected date: 09/09/2022  -     Dog; Future; Expected date: 09/09/2022  -     Mushroom IgE; Future; Expected date: 09/09/2022        Patient Instructions:     Patient Instructions   Testing  Blood work for allergy testing today       Check [facility] in one week for results or call [phone]       Contact me with questions or concerns       I will contact you if anything needs immediate attention.        Treatment    DESONIDE LOTION to rashy areas twice a day    Elta sunscreen  Avoid any anti-wrinkle or brightening creams on face for now  Continue same eye regimen    Follow up in about 2 weeks (around 9/23/2022).        Concetta Acharya MD  Allergy, Asthma and Immunology    I spent a total of 60 minutes on the day of the visit.This includes face to face time and non-face to face time preparing to see the patient (e.g., review of tests), obtaining and/or reviewing separately obtained history, documenting clinical  information in the electronic or other health record, independently interpreting results and communicating results to the patient/family/caregiver, or care coordinator.

## 2022-09-12 LAB
A ALTERNATA IGE QN: <0.1 KU/L
A FUMIGATUS IGE QN: <0.1 KU/L
BERMUDA GRASS IGE QN: <0.1 KU/L
CAT DANDER IGE QN: <0.1 KU/L
CEDAR IGE QN: <0.1 KU/L
D FARINAE IGE QN: <0.1 KU/L
D PTERONYSS IGE QN: <0.1 KU/L
DEPRECATED A ALTERNATA IGE RAST QL: NORMAL
DEPRECATED A FUMIGATUS IGE RAST QL: NORMAL
DEPRECATED BERMUDA GRASS IGE RAST QL: NORMAL
DEPRECATED CAT DANDER IGE RAST QL: NORMAL
DEPRECATED CEDAR IGE RAST QL: NORMAL
DEPRECATED D FARINAE IGE RAST QL: NORMAL
DEPRECATED D PTERONYSS IGE RAST QL: NORMAL
DEPRECATED DOG DANDER IGE RAST QL: NORMAL
DEPRECATED ENGL PLANTAIN IGE RAST QL: NORMAL
DEPRECATED MUSHROOM IGE RAST QL: NORMAL
DEPRECATED PECAN/HICK TREE IGE RAST QL: NORMAL
DEPRECATED ROACH IGE RAST QL: ABNORMAL
DEPRECATED TIMOTHY IGE RAST QL: NORMAL
DEPRECATED WEST RAGWEED IGE RAST QL: NORMAL
DEPRECATED WHITE OAK IGE RAST QL: NORMAL
DOG DANDER IGE QN: <0.1 KU/L
ENGL PLANTAIN IGE QN: <0.1 KU/L
MUSHROOM IGE QN: <0.1 KU/L
PECAN/HICK TREE IGE QN: <0.1 KU/L
ROACH IGE QN: 0.13 KU/L
TIMOTHY IGE QN: <0.1 KU/L
WEST RAGWEED IGE QN: <0.1 KU/L
WHITE OAK IGE QN: <0.1 KU/L

## 2022-09-22 NOTE — PROGRESS NOTES
Allergy Clinic Note  [facility]    This note was created by combination of typed  and M-Modal dictation. Transcription errors may be present.  If there are any questions, please contact me.    Subjective:      Patient ID: Dyan Arredondo is a 51 y.o. female.    Chief Complaint: Follow-up (With lab results)      Allergy Problem List:      History of Present Illness: Dyan Arredondo is a 51 y.o. female with eczema was seen once 9/9/2022.  She returns today to follow up symptoms and labs.    Related medications and other interventions  EpiPen  Flonase  Desonide lotion using sparingly with benefit  TMC 0.025 cream  Elta sunscreen    9/23/22:  Client reports significant improvement in her facial rash as well as her neck rash.  She is very happy with the desonide used on an as needed basis.  No significant itching.  No problems with the medication.  No new problems or complaints.    9/9/22:  At initial visit client reported 2 episodes of mildly pruritic rash on her upper chest extending to her neck.  Both episodes lasted more than 5 days.  The rash is mildly pruritic in did spread and change shape while present.  Client reports a long history of sensitive skin, especially to heat.  She needs to take cold showers and cold pedicure is a in order to avoid her skin turning red.  She thinks it turns a solid red it is not entirely sure.  This is nonpruritic.  Patient also has chronically pink cheeks without pustules.  Dust as a possible precipitant, as the 1st episode occurred while she was helping at her mother's house and the 2nd occurred while getting things out of storage for Halloween. She is also concerned about mushrooms. She has been given systemic steroids on 2 occasions and also triamcinolone which she barely uses.  She has never had allergy testing        MEDICAL HISTORY      Significant past medical history:   ENT surgery:  sinus surgery, turbinate reduction    Significant family history:   mother with allergies  Exposures: 2 cats in bed; minimal mold near A/C duct.  No smoke.  Smoking Hx:  Client  reports that she has never smoked. She has never been exposed to tobacco smoke. She has never used smokeless tobacco.      Asthma: No  Eczema: Yes (new Dx by me)  Rhinitis: Yes  Drug allergy/intolerance: multiple  Venom allergy: denies  Latex allergy:  denies    Patient Active Problem List   Diagnosis    GERD (gastroesophageal reflux disease)    Cold sore    Numbness of foot    High risk HPV infection     Medication List with Changes/Refills   Current Medications    ACYCLOVIR (ZOVIRAX) 400 MG TABLET    TAKE 1 TABLET BY MOUTH TWICE A DAY. START AT ONSET OF SYMPTOMS. TAKE FOR 5 DAYS TOTAL.       Start Date: 4/27/2022 End Date: --    DESONIDE (DESOWEN) 0.05 % LOTION    Apply topically 2 (two) times daily.       Start Date: 9/9/2022  End Date: --    DIPHENOXYLATE-ATROPINE 2.5-0.025 MG (LOMOTIL) 2.5-0.025 MG PER TABLET    Take 1 tablet by mouth 4 (four) times daily as needed for Diarrhea.       Start Date: 4/4/2022  End Date: --    EPINEPHRINE (EPIPEN 2-DAVID) 0.3 MG/0.3 ML ATIN    Give self one auto-injection as needed for severe allergic reaction, then seek emergency care       Start Date: 8/13/2022 End Date: --    FAMOTIDINE (PEPCID) 40 MG TABLET    TAKE 1 TABLET BY MOUTH EVERY DAY       Start Date: 7/1/2022  End Date: --    FLUTICASONE PROPIONATE (FLONASE) 50 MCG/ACTUATION NASAL SPRAY    1 spray (50 mcg total) by Each Nostril route once daily.       Start Date: 4/4/2022  End Date: --    HYDROCORTISONE 2.5 % CREAM    Apply topically 2 (two) times daily.       Start Date: 8/13/2022 End Date: --    NORGESTIMATE-ETHINYL ESTRADIOL (TRI-LO-ESTARYLLA) 0.18/0.215/0.25 MG-25 MCG TABLET    Take 1 tablet by mouth once daily.       Start Date: 4/4/2022  End Date: --    TRIAMCINOLONE ACETONIDE 0.025% (KENALOG) 0.025 % CREAM    Apply topically 2 (two) times daily. for 7 days       Start Date: 8/27/2022 End Date: 9/3/2022  "          REVIEW OF SYSTEMS      CONST: no F/C/NS, no unintentional weight changes  NEURO:  no tremor, no weakness  EYES: no discharge, no pruritus, no erythema  EARS: no hearing loss, no sensation of fullness  NOSE: no congestion, no rhinorrhea, no sneezing  PULM:  no SOB, no wheezing, no cough  CV: no CP, no palpitations, no leg swelling  GI:  no abdominal pain, no blood in stool  :  no dysuria, no hematuria  DERM: + rashes, no skin breaks    Objective:     Physical Exam:     Ht 5' 8" (1.727 m)   Wt 107 kg (235 lb 14.3 oz)   BMI 35.87 kg/m²   GEN: Awake and alert, no distress  DERM: No rashes or flushing  EYE:  No occular discharge  HEENT: No nasal discharge, no hoarseness  PULM: Normal work of breathing, no cough  NEURO:  No focal deficit, speech fluent and logical  PSYCH: appropriate affect, normal behavior  SKIN:   neck--> small pink patch  under chin--> Clear  Chin--> clear  Cheeks--> mildly pink.  No pustules        Allergy Testing      Immunocaps negative (2022)  All aeroallergens less than 0.35 KU/L.  Cockroach 0.13  Mushoom negative  Total serum IgE 50      Lab results   Eosinophil count 200 on CBC 12/20/2021        Imaging and other diagnostics            Medical records review   09/09/2022 I reviewed urgent care note of 08/27/2022.  She presented with a papular rash on her neck.  She was treated initially with steroid shot and antibiotics 08/13/2022, but the rash reappeared her neck and appeared to be spreading to her face.  No other symptoms.  Physical exam describes erythematous rash to neck there is a photo showing 3 papules or small nodules at the base of her neck with irregular borders.  She was treated with topical and oral steroids.             ASSESSMENT & PLAN       Diagnoses:     Dyan Arredondo is a 51 y.o. female. with  1. Facial rash improved    2. Eczema, unspecified type            Medical Decision making:   Ms Arredondo is presenting with an eczematous rash which has improved " significantly on desonide lotion.  She has no underlying allergies I recommend she continue desonide lotion on a p.r.n. basis.  If she flares up in the future, I recommend seeing a dermatologist.    Facial rash improved    Eczema, unspecified type          Patient Instructions:     Patient Instructions   Cause of rash is unknown.  No allergies.    Recommend:    Continue desonide lotion as needed    We are happy to do refills as long as you have been seen within previous 12 months    2.  If it flares up in the future, recommend seeing a dermatologist.    Follow up As needed.        Concetta Acharya MD  Allergy, Asthma and Immunology

## 2022-09-23 ENCOUNTER — OFFICE VISIT (OUTPATIENT)
Dept: ALLERGY | Facility: CLINIC | Age: 52
End: 2022-09-23
Payer: COMMERCIAL

## 2022-09-23 VITALS — BODY MASS INDEX: 35.75 KG/M2 | WEIGHT: 235.88 LBS | HEIGHT: 68 IN

## 2022-09-23 DIAGNOSIS — L30.9 ECZEMA, UNSPECIFIED TYPE: ICD-10-CM

## 2022-09-23 DIAGNOSIS — R21 FACIAL RASH: Primary | ICD-10-CM

## 2022-09-23 PROCEDURE — 99213 PR OFFICE/OUTPT VISIT, EST, LEVL III, 20-29 MIN: ICD-10-PCS | Mod: S$GLB,,, | Performed by: STUDENT IN AN ORGANIZED HEALTH CARE EDUCATION/TRAINING PROGRAM

## 2022-09-23 PROCEDURE — 99999 PR PBB SHADOW E&M-EST. PATIENT-LVL III: ICD-10-PCS | Mod: PBBFAC,,, | Performed by: STUDENT IN AN ORGANIZED HEALTH CARE EDUCATION/TRAINING PROGRAM

## 2022-09-23 PROCEDURE — 1159F MED LIST DOCD IN RCRD: CPT | Mod: CPTII,S$GLB,, | Performed by: STUDENT IN AN ORGANIZED HEALTH CARE EDUCATION/TRAINING PROGRAM

## 2022-09-23 PROCEDURE — 99999 PR PBB SHADOW E&M-EST. PATIENT-LVL III: CPT | Mod: PBBFAC,,, | Performed by: STUDENT IN AN ORGANIZED HEALTH CARE EDUCATION/TRAINING PROGRAM

## 2022-09-23 PROCEDURE — 99213 OFFICE O/P EST LOW 20 MIN: CPT | Mod: S$GLB,,, | Performed by: STUDENT IN AN ORGANIZED HEALTH CARE EDUCATION/TRAINING PROGRAM

## 2022-09-23 PROCEDURE — 1160F RVW MEDS BY RX/DR IN RCRD: CPT | Mod: CPTII,S$GLB,, | Performed by: STUDENT IN AN ORGANIZED HEALTH CARE EDUCATION/TRAINING PROGRAM

## 2022-09-23 PROCEDURE — 1159F PR MEDICATION LIST DOCUMENTED IN MEDICAL RECORD: ICD-10-PCS | Mod: CPTII,S$GLB,, | Performed by: STUDENT IN AN ORGANIZED HEALTH CARE EDUCATION/TRAINING PROGRAM

## 2022-09-23 PROCEDURE — 3008F PR BODY MASS INDEX (BMI) DOCUMENTED: ICD-10-PCS | Mod: CPTII,S$GLB,, | Performed by: STUDENT IN AN ORGANIZED HEALTH CARE EDUCATION/TRAINING PROGRAM

## 2022-09-23 PROCEDURE — 3008F BODY MASS INDEX DOCD: CPT | Mod: CPTII,S$GLB,, | Performed by: STUDENT IN AN ORGANIZED HEALTH CARE EDUCATION/TRAINING PROGRAM

## 2022-09-23 PROCEDURE — 1160F PR REVIEW ALL MEDS BY PRESCRIBER/CLIN PHARMACIST DOCUMENTED: ICD-10-PCS | Mod: CPTII,S$GLB,, | Performed by: STUDENT IN AN ORGANIZED HEALTH CARE EDUCATION/TRAINING PROGRAM

## 2022-09-23 NOTE — PATIENT INSTRUCTIONS
Cause of rash is unknown.  No allergies.    Recommend:    Continue desonide lotion as needed    We are happy to do refills as long as you have been seen within previous 12 months    2.  If it flares up in the future, recommend seeing a dermatologist.

## 2022-10-10 ENCOUNTER — PATIENT MESSAGE (OUTPATIENT)
Dept: ADMINISTRATIVE | Facility: HOSPITAL | Age: 52
End: 2022-10-10
Payer: COMMERCIAL

## 2022-11-21 ENCOUNTER — PATIENT OUTREACH (OUTPATIENT)
Dept: ADMINISTRATIVE | Facility: HOSPITAL | Age: 52
End: 2022-11-21
Payer: COMMERCIAL

## 2022-12-12 ENCOUNTER — HOSPITAL ENCOUNTER (OUTPATIENT)
Dept: RADIOLOGY | Facility: HOSPITAL | Age: 52
Discharge: HOME | End: 2022-12-12
Attending: INTERNAL MEDICINE
Payer: COMMERCIAL

## 2022-12-12 DIAGNOSIS — Z12.31 SCREENING MAMMOGRAM FOR BREAST CANCER: ICD-10-CM

## 2022-12-12 PROCEDURE — 77067 SCR MAMMO BI INCL CAD: CPT | Mod: 26,,, | Performed by: RADIOLOGY

## 2022-12-12 PROCEDURE — 77063 MAMMO DIGITAL SCREENING BILAT WITH TOMO: ICD-10-PCS | Mod: 26,,, | Performed by: RADIOLOGY

## 2022-12-12 PROCEDURE — 77067 MAMMO DIGITAL SCREENING BILAT WITH TOMO: ICD-10-PCS | Mod: 26,,, | Performed by: RADIOLOGY

## 2022-12-12 PROCEDURE — 77063 BREAST TOMOSYNTHESIS BI: CPT | Mod: TC

## 2022-12-12 PROCEDURE — 77063 BREAST TOMOSYNTHESIS BI: CPT | Mod: 26,,, | Performed by: RADIOLOGY

## 2022-12-19 ENCOUNTER — TELEPHONE (OUTPATIENT)
Dept: INTERNAL MEDICINE | Facility: CLINIC | Age: 52
End: 2022-12-19
Payer: COMMERCIAL

## 2022-12-19 NOTE — TELEPHONE ENCOUNTER
Called patient to inform the patient that mammogram results were normal. Patient verbalized understanding.

## 2022-12-27 ENCOUNTER — TELEPHONE (OUTPATIENT)
Dept: ORTHOPEDICS | Facility: CLINIC | Age: 52
End: 2022-12-27
Payer: COMMERCIAL

## 2022-12-27 DIAGNOSIS — R52 PAIN: Primary | ICD-10-CM

## 2022-12-28 ENCOUNTER — OFFICE VISIT (OUTPATIENT)
Dept: ORTHOPEDICS | Facility: CLINIC | Age: 52
End: 2022-12-28
Payer: COMMERCIAL

## 2022-12-28 ENCOUNTER — HOSPITAL ENCOUNTER (OUTPATIENT)
Dept: RADIOLOGY | Facility: HOSPITAL | Age: 52
Discharge: HOME | End: 2022-12-28
Attending: PHYSICIAN ASSISTANT
Payer: COMMERCIAL

## 2022-12-28 VITALS
SYSTOLIC BLOOD PRESSURE: 130 MMHG | WEIGHT: 230.81 LBS | BODY MASS INDEX: 34.98 KG/M2 | DIASTOLIC BLOOD PRESSURE: 83 MMHG | HEIGHT: 68 IN | HEART RATE: 112 BPM

## 2022-12-28 DIAGNOSIS — R52 PAIN: ICD-10-CM

## 2022-12-28 DIAGNOSIS — M23.91 INTERNAL DERANGEMENT OF RIGHT KNEE: Primary | ICD-10-CM

## 2022-12-28 DIAGNOSIS — M62.81 QUADRICEPS WEAKNESS: ICD-10-CM

## 2022-12-28 PROCEDURE — 3079F PR MOST RECENT DIASTOLIC BLOOD PRESSURE 80-89 MM HG: ICD-10-PCS | Mod: CPTII,S$GLB,, | Performed by: PHYSICIAN ASSISTANT

## 2022-12-28 PROCEDURE — 99203 PR OFFICE/OUTPT VISIT, NEW, LEVL III, 30-44 MIN: ICD-10-PCS | Mod: S$GLB,,, | Performed by: PHYSICIAN ASSISTANT

## 2022-12-28 PROCEDURE — 73564 X-RAY EXAM KNEE 4 OR MORE: CPT | Mod: 26,RT,, | Performed by: RADIOLOGY

## 2022-12-28 PROCEDURE — 73564 XR KNEE COMP 4 OR MORE VIEWS RIGHT: ICD-10-PCS | Mod: 26,RT,, | Performed by: RADIOLOGY

## 2022-12-28 PROCEDURE — 1160F PR REVIEW ALL MEDS BY PRESCRIBER/CLIN PHARMACIST DOCUMENTED: ICD-10-PCS | Mod: CPTII,S$GLB,, | Performed by: PHYSICIAN ASSISTANT

## 2022-12-28 PROCEDURE — 3008F BODY MASS INDEX DOCD: CPT | Mod: CPTII,S$GLB,, | Performed by: PHYSICIAN ASSISTANT

## 2022-12-28 PROCEDURE — 3075F SYST BP GE 130 - 139MM HG: CPT | Mod: CPTII,S$GLB,, | Performed by: PHYSICIAN ASSISTANT

## 2022-12-28 PROCEDURE — 3075F PR MOST RECENT SYSTOLIC BLOOD PRESS GE 130-139MM HG: ICD-10-PCS | Mod: CPTII,S$GLB,, | Performed by: PHYSICIAN ASSISTANT

## 2022-12-28 PROCEDURE — 1159F PR MEDICATION LIST DOCUMENTED IN MEDICAL RECORD: ICD-10-PCS | Mod: CPTII,S$GLB,, | Performed by: PHYSICIAN ASSISTANT

## 2022-12-28 PROCEDURE — 1159F MED LIST DOCD IN RCRD: CPT | Mod: CPTII,S$GLB,, | Performed by: PHYSICIAN ASSISTANT

## 2022-12-28 PROCEDURE — 99999 PR PBB SHADOW E&M-EST. PATIENT-LVL III: ICD-10-PCS | Mod: PBBFAC,,, | Performed by: PHYSICIAN ASSISTANT

## 2022-12-28 PROCEDURE — 73564 X-RAY EXAM KNEE 4 OR MORE: CPT | Mod: TC,PN,RT

## 2022-12-28 PROCEDURE — 1160F RVW MEDS BY RX/DR IN RCRD: CPT | Mod: CPTII,S$GLB,, | Performed by: PHYSICIAN ASSISTANT

## 2022-12-28 PROCEDURE — 3008F PR BODY MASS INDEX (BMI) DOCUMENTED: ICD-10-PCS | Mod: CPTII,S$GLB,, | Performed by: PHYSICIAN ASSISTANT

## 2022-12-28 PROCEDURE — 99203 OFFICE O/P NEW LOW 30 MIN: CPT | Mod: S$GLB,,, | Performed by: PHYSICIAN ASSISTANT

## 2022-12-28 PROCEDURE — 3079F DIAST BP 80-89 MM HG: CPT | Mod: CPTII,S$GLB,, | Performed by: PHYSICIAN ASSISTANT

## 2022-12-28 PROCEDURE — 99999 PR PBB SHADOW E&M-EST. PATIENT-LVL III: CPT | Mod: PBBFAC,,, | Performed by: PHYSICIAN ASSISTANT

## 2022-12-28 NOTE — PROGRESS NOTES
Subjective:      Patient ID: Dyan Arredondo is a 52 y.o. female.    Chief Complaint: Pain of the Right Knee      52-year-old female presents with 5 year history of occasional right knee pain.  Patient states he has a tall bed and has to climb into her bed by twisting her knee.  She does this repetitively daily.  She believes this motion causes flare-ups of pain in her right knee.  Usually the flare-ups resolved with rest and time.  Her last flare-up was 2 months ago and has never improved.  She can not pinpoint a specific area of pain today.  She states it is her entire knee.  Her symptoms are worse with weight-bearing.  She notes instability and has had an episode of her knee giving way this morning.  She denies stiffness and swelling.  She is not taking any medication for pain.  She states she does not like taking oral medication.      Review of Systems   Constitutional: Negative for chills and fever.   Cardiovascular:  Negative for chest pain.   Respiratory:  Negative for cough.    Hematologic/Lymphatic: Does not bruise/bleed easily.   Skin:  Negative for poor wound healing and rash.   Musculoskeletal:  Positive for joint pain, muscle weakness and myalgias. Negative for stiffness.   Gastrointestinal:  Negative for abdominal pain.   Genitourinary:  Negative for bladder incontinence.   Neurological:  Negative for dizziness, loss of balance and weakness.   Psychiatric/Behavioral:  Negative for altered mental status.      Review of patient's allergies indicates:   Allergen Reactions    Amoxicillin trihydrate Rash     Rash and hives     Erythromycin base Rash     Rash and hives     Omnipaque 140 [iohexol] Rash     Rash and hives     Penicillins Rash     Rash and hives     Tetracyclines Hives and Rash     TETRACYCLINE Hydrochloride     Clindamycin Hives        Current Outpatient Medications   Medication Sig Dispense Refill    acyclovir (ZOVIRAX) 400 MG tablet TAKE 1 TABLET BY MOUTH TWICE A DAY. START AT ONSET OF  "SYMPTOMS. TAKE FOR 5 DAYS TOTAL. 180 tablet 1    desonide (DESOWEN) 0.05 % lotion Apply topically 2 (two) times daily. 236 mL 1    diphenoxylate-atropine 2.5-0.025 mg (LOMOTIL) 2.5-0.025 mg per tablet Take 1 tablet by mouth 4 (four) times daily as needed for Diarrhea. 120 tablet 0    EPINEPHrine (EPIPEN 2-DAVID) 0.3 mg/0.3 mL AtIn Give self one auto-injection as needed for severe allergic reaction, then seek emergency care 1 each 0    famotidine (PEPCID) 40 MG tablet TAKE 1 TABLET BY MOUTH EVERY DAY 90 tablet 1    fluticasone propionate (FLONASE) 50 mcg/actuation nasal spray 1 spray (50 mcg total) by Each Nostril route once daily. 48 mL 2    hydrocortisone 2.5 % cream Apply topically 2 (two) times daily. 28 g 0    norgestimate-ethinyl estradioL (TRI-LO-ESTARYLLA) 0.18/0.215/0.25 mg-25 mcg tablet Take 1 tablet by mouth once daily. 84 tablet 3    triamcinolone acetonide 0.025% (KENALOG) 0.025 % cream Apply topically 2 (two) times daily. for 7 days 15 g 0     No current facility-administered medications for this visit.        The patient's relevant past medical, surgical, and social history was reviewed in Epic.       Objective:      VITAL SIGNS: /83   Pulse (!) 112   Ht 5' 8" (1.727 m)   Wt 104.7 kg (230 lb 13.2 oz)   BMI 35.10 kg/m²     General    Nursing note and vitals reviewed.  Constitutional: She is oriented to person, place, and time. She appears well-developed and well-nourished.   Neurological: She is alert and oriented to person, place, and time.     General Musculoskeletal Exam   Gait: normal       Right Knee Exam     Inspection   Swelling: present    Tenderness   The patient is experiencing no tenderness.     Range of Motion   Extension:  5   Flexion:  130     Tests   Meniscus   Grey:  Medial - positive   Ligament Examination   Lachman: normal (-1 to 2mm)     Other   Sensation: normal    Muscle Strength   Right Lower Extremity   Quadriceps:  4/5   Left Lower Extremity   Quadriceps:  4/5  "     X-Ray Knee Complete 4 Or More Views Right  Narrative: EXAMINATION:  XR KNEE COMP 4 OR MORE VIEWS RIGHT    TECHNIQUE:  Four views of the right knee were obtained, with AP, AP flexion, lateral, and patellar projections submitted.    COMPARISON:  No relevant comparison examinations are currently available.  Clinical information of pain, with no trauma specified.    FINDINGS:  No significant degree of tibiofemoral or patellofemoral joint space narrowing is observed.  Osseous structures appear unremarkable, with no evidence of recent or healing fracture, lytic destructive process, osteochondral defect, or other significant abnormality identified.  No significant joint effusion.  Impression: As above    Electronically signed by: Ravindra Sanchez MD  Date:    12/28/2022  Time:    09:41      I have reviewed the above radiograph and agree with the findings stated by the radiologist.         Assessment:       1. Internal derangement of right knee    2. Quadriceps weakness          Plan:         Dyan was seen today for pain.    Diagnoses and all orders for this visit:    Internal derangement of right knee  -     MRI Knee Without Contrast Right; Future    Quadriceps weakness  -     MRI Knee Without Contrast Right; Future    Explained to the patient she could possibly have a meniscus tear due to the repetitive twisting motions as well as her mechanical symptoms she reports today.  I will order a right knee MRI for confirmation.  I will call her with results once completed.    Diagnoses and plan discussed with the patient, as well as the expected course and duration of his symptoms.  All questions and concerns were addressed prior to the end of the visit.   Instructed patient to call office if they have any future questions/concerns or to schedule apt. Patient will return to see me if symptoms worsen or fail to improve    Note dictated with voice recognition software, please excuse any grammatical errors.        Angelina Walters,  MATTHEW   12/28/2022

## 2023-01-07 ENCOUNTER — HOSPITAL ENCOUNTER (OUTPATIENT)
Dept: RADIOLOGY | Facility: HOSPITAL | Age: 53
Discharge: HOME | End: 2023-01-07
Attending: PHYSICIAN ASSISTANT
Payer: COMMERCIAL

## 2023-01-07 DIAGNOSIS — M62.81 QUADRICEPS WEAKNESS: ICD-10-CM

## 2023-01-07 DIAGNOSIS — M23.91 INTERNAL DERANGEMENT OF RIGHT KNEE: ICD-10-CM

## 2023-01-07 PROCEDURE — 73721 MRI JNT OF LWR EXTRE W/O DYE: CPT | Mod: 26,RT,, | Performed by: RADIOLOGY

## 2023-01-07 PROCEDURE — 73721 MRI KNEE WITHOUT CONTRAST RIGHT: ICD-10-PCS | Mod: 26,RT,, | Performed by: RADIOLOGY

## 2023-01-07 PROCEDURE — 73721 MRI JNT OF LWR EXTRE W/O DYE: CPT | Mod: TC,RT

## 2023-01-09 ENCOUNTER — TELEPHONE (OUTPATIENT)
Dept: ORTHOPEDICS | Facility: CLINIC | Age: 53
End: 2023-01-09
Payer: COMMERCIAL

## 2023-01-09 NOTE — TELEPHONE ENCOUNTER
Spoke with patient regarding right knee MRI results.  Reveals complex lateral meniscus tear as well as mild tricompartmental arthritis.  I discussed the options with the patient which would include conservative treatment versus surgical intervention of a knee scope.  She would like to proceed with surgical intervention if possible.  I will refer her to Dr. Ruiz who she will see tomorrow to discuss this.

## 2023-01-09 NOTE — TELEPHONE ENCOUNTER
----- Message from Berta Todd sent at 1/9/2023  9:13 AM CST -----  Regarding: earlier time  Contact: [phone]  Patient is requesting a call back regarding changing her appt to the 8am time on tomorrow.   Would the patient rather a call back or a response via [facility]?  Call   Best Call Back Number:  [phone]  Additional Information:

## 2023-01-10 ENCOUNTER — OFFICE VISIT (OUTPATIENT)
Dept: ORTHOPEDICS | Facility: CLINIC | Age: 53
End: 2023-01-10
Payer: COMMERCIAL

## 2023-01-10 VITALS
HEIGHT: 68 IN | DIASTOLIC BLOOD PRESSURE: 87 MMHG | SYSTOLIC BLOOD PRESSURE: 137 MMHG | WEIGHT: 235.44 LBS | BODY MASS INDEX: 35.68 KG/M2 | HEART RATE: 112 BPM

## 2023-01-10 DIAGNOSIS — M17.11 PRIMARY OSTEOARTHRITIS OF RIGHT KNEE: ICD-10-CM

## 2023-01-10 DIAGNOSIS — S83.271A COMPLEX TEAR OF LAT MENSC, CURRENT INJURY, RIGHT KNEE, INIT: Primary | ICD-10-CM

## 2023-01-10 PROCEDURE — 1160F RVW MEDS BY RX/DR IN RCRD: CPT | Mod: CPTII,S$GLB,, | Performed by: ORTHOPAEDIC SURGERY

## 2023-01-10 PROCEDURE — 1160F PR REVIEW ALL MEDS BY PRESCRIBER/CLIN PHARMACIST DOCUMENTED: ICD-10-PCS | Mod: CPTII,S$GLB,, | Performed by: ORTHOPAEDIC SURGERY

## 2023-01-10 PROCEDURE — 99214 PR OFFICE/OUTPT VISIT, EST, LEVL IV, 30-39 MIN: ICD-10-PCS | Mod: S$GLB,,, | Performed by: ORTHOPAEDIC SURGERY

## 2023-01-10 PROCEDURE — 99999 PR PBB SHADOW E&M-EST. PATIENT-LVL IV: CPT | Mod: PBBFAC,,, | Performed by: ORTHOPAEDIC SURGERY

## 2023-01-10 PROCEDURE — 3008F PR BODY MASS INDEX (BMI) DOCUMENTED: ICD-10-PCS | Mod: CPTII,S$GLB,, | Performed by: ORTHOPAEDIC SURGERY

## 2023-01-10 PROCEDURE — 3008F BODY MASS INDEX DOCD: CPT | Mod: CPTII,S$GLB,, | Performed by: ORTHOPAEDIC SURGERY

## 2023-01-10 PROCEDURE — 1159F PR MEDICATION LIST DOCUMENTED IN MEDICAL RECORD: ICD-10-PCS | Mod: CPTII,S$GLB,, | Performed by: ORTHOPAEDIC SURGERY

## 2023-01-10 PROCEDURE — 3075F SYST BP GE 130 - 139MM HG: CPT | Mod: CPTII,S$GLB,, | Performed by: ORTHOPAEDIC SURGERY

## 2023-01-10 PROCEDURE — 99999 PR PBB SHADOW E&M-EST. PATIENT-LVL IV: ICD-10-PCS | Mod: PBBFAC,,, | Performed by: ORTHOPAEDIC SURGERY

## 2023-01-10 PROCEDURE — 3079F DIAST BP 80-89 MM HG: CPT | Mod: CPTII,S$GLB,, | Performed by: ORTHOPAEDIC SURGERY

## 2023-01-10 PROCEDURE — 99214 OFFICE O/P EST MOD 30 MIN: CPT | Mod: S$GLB,,, | Performed by: ORTHOPAEDIC SURGERY

## 2023-01-10 PROCEDURE — 3075F PR MOST RECENT SYSTOLIC BLOOD PRESS GE 130-139MM HG: ICD-10-PCS | Mod: CPTII,S$GLB,, | Performed by: ORTHOPAEDIC SURGERY

## 2023-01-10 PROCEDURE — 3079F PR MOST RECENT DIASTOLIC BLOOD PRESSURE 80-89 MM HG: ICD-10-PCS | Mod: CPTII,S$GLB,, | Performed by: ORTHOPAEDIC SURGERY

## 2023-01-10 PROCEDURE — 1159F MED LIST DOCD IN RCRD: CPT | Mod: CPTII,S$GLB,, | Performed by: ORTHOPAEDIC SURGERY

## 2023-01-10 NOTE — PROGRESS NOTES
Patient ID:   Dyan Arredondo is a 52 y.o. female.    Chief Complaint:   Right knee pain    HPI:   A 52-year-old female who is presenting today with right knee pain.  She attributes the pain to having to get in and out of her bed which sits in an elevated position.  She states that she will often twist her knee as she is getting in and out of the bed.  She reports fairly global knee pain.  She reports worsening of the pain when she is active.  Pain is worse the longer she walks.  She denies any prior surgery to the right knee.  She is been taking Advil which does alleviate some of the pain.  Current pain level is 410.  She has occasional popping and giving way of the knee.      Medications:    Current Outpatient Medications:     acyclovir (ZOVIRAX) 400 MG tablet, TAKE 1 TABLET BY MOUTH TWICE A DAY. START AT ONSET OF SYMPTOMS. TAKE FOR 5 DAYS TOTAL., Disp: 180 tablet, Rfl: 1    desonide (DESOWEN) 0.05 % lotion, Apply topically 2 (two) times daily., Disp: 236 mL, Rfl: 1    diphenoxylate-atropine 2.5-0.025 mg (LOMOTIL) 2.5-0.025 mg per tablet, Take 1 tablet by mouth 4 (four) times daily as needed for Diarrhea., Disp: 120 tablet, Rfl: 0    EPINEPHrine (EPIPEN 2-DAVID) 0.3 mg/0.3 mL AtIn, Give self one auto-injection as needed for severe allergic reaction, then seek emergency care, Disp: 1 each, Rfl: 0    famotidine (PEPCID) 40 MG tablet, TAKE 1 TABLET BY MOUTH EVERY DAY, Disp: 90 tablet, Rfl: 1    fluticasone propionate (FLONASE) 50 mcg/actuation nasal spray, 1 spray (50 mcg total) by Each Nostril route once daily., Disp: 48 mL, Rfl: 2    hydrocortisone 2.5 % cream, Apply topically 2 (two) times daily., Disp: 28 g, Rfl: 0    norgestimate-ethinyl estradioL (TRI-LO-ESTARYLLA) 0.18/0.215/0.25 mg-25 mcg tablet, Take 1 tablet by mouth once daily., Disp: 84 tablet, Rfl: 3    triamcinolone acetonide 0.025% (KENALOG) 0.025 % cream, Apply topically 2 (two) times daily. for 7 days, Disp: 15 g, Rfl: 0    Allergies:  Review of  "patient's allergies indicates:   Allergen Reactions    Amoxicillin trihydrate Rash     Rash and hives     Erythromycin base Rash     Rash and hives     Omnipaque 140 [iohexol] Rash     Rash and hives     Penicillins Rash     Rash and hives     Tetracyclines Hives and Rash     TETRACYCLINE Hydrochloride     Clindamycin Hives       Past Medical History:  Past Medical History:   Diagnosis Date    Allergy     Breast cyst     High risk HPV infection         Past Surgical History:  Past Surgical History:   Procedure Laterality Date    BREAST CYST EXCISION Right     EXTERNAL EAR SURGERY      SINUS SURGERY      turbanite         Social History:  Social History     Occupational History    Not on file   Tobacco Use    Smoking status: Never     Passive exposure: Never    Smokeless tobacco: Never   Substance and Sexual Activity    Alcohol use: Yes    Drug use: No    Sexual activity: Yes     Partners: Male     Birth control/protection: OCP       Family History:  Family History   Problem Relation Age of Onset    Allergies Mother     Cancer Father 80        melanoma    Depression Brother     Cancer Paternal Aunt         lung CA +smoker ?    Cancer Paternal Grandmother         dx age 50's? +smoker (possibly)    Cancer Paternal Grandfather         lung CA dx age 50's +smoker (possibly)    Breast cancer Neg Hx     Colon cancer Neg Hx     Ovarian cancer Neg Hx         ROS:  Review of Systems   Musculoskeletal:  Positive for joint pain, joint swelling, myalgias and stiffness.   Neurological:  Negative for numbness and paresthesias.   All other systems reviewed and are negative.    Vitals:  /87   Pulse (!) 112   Ht 5' 8" (1.727 m)   Wt 106.8 kg (235 lb 7.2 oz)   BMI 35.80 kg/m²     Physical Examination:  Comprehensive Orthopaedic Musculoskeletal Exam    General      Constitutional: appears stated age, moderately obese, well-developed and well-nourished    Scleral icterus: no    Labored breathing: no    Psychiatric: normal mood " and affect and no acute distress    Neurological: alert and oriented x3    Skin: intact    Lymphadenopathy: none   Ortho Exam     Right knee exam:  There is a slight effusion.  There is tenderness along the lateral joint line.  Positive Grey's.  Range of motion is -2 degrees of extension and 135 of flexion.  The knee is stable to varus and valgus stress, Lachman's, and posterior drawer.  Negative patellar grind.    Imaging:  I have independently reviewed the following imaging studies performed at [facility]:    Small osteophyte is seen at lateral tibial margin. Minimal joint space loss. (Kellgren-Adrian II)    EXAMINATION:  XR KNEE COMP 4 OR MORE VIEWS RIGHT     TECHNIQUE:  Four views of the right knee were obtained, with AP, AP flexion, lateral, and patellar projections submitted.     COMPARISON:  No relevant comparison examinations are currently available.  Clinical information of pain, with no trauma specified.     FINDINGS:  No significant degree of tibiofemoral or patellofemoral joint space narrowing is observed.  Osseous structures appear unremarkable, with no evidence of recent or healing fracture, lytic destructive process, osteochondral defect, or other significant abnormality identified.  No significant joint effusion.     Impression:     As above     Electronically signed by: Ravindra Sanchez MD  Date:                                            12/28/2022  Time:                                           09:41    MRI Knee Without Contrast Right  Narrative: EXAMINATION:  MRI KNEE WITHOUT CONTRAST RIGHT    CLINICAL HISTORY:  Knee trauma, internal derangement suspected, xray done;Muscle weakness (generalized)    TECHNIQUE:  Multiplanar, multisequence images were performed about the RIGHT knee.    COMPARISON:  12/28/2022    FINDINGS:  **MENISCI:    Medial meniscus: Intact anterior horn, body, and posterior horn.    Lateral meniscus: Complex tear anterior horn lateral meniscus.  Posterior horn intact.    Meniscal  root attachment: Intact    Popliteal hiatus, superior and inferior meniscal fascicles:Visualized and intact    **LIGAMENTS:    Anterior cruciate ligament: Continuous, with normal signal.    Posterior cruciate ligament: Continuous, with normal signal.    Medial collateral ligament: Intact.    Lateral collateral ligament and  biceps femoris: Intact.    Iliotibial band (ITB): No evidence for ITB syndrome.    Popliteus tendon and popliteofibular ligament: Intact.    Posterior medial and posterior lateral corners: Intact.    **TENDONS:    Quadriceps tendon: Intact.    Patella tendon: Intact.    Joint fluid: Moderate effusion.    Hoffa's fat pad: Mild edematous change likely related to tear at the level of the anterior horn lateral meniscus..    Intact medial retinaculum/ MPFL.    Intact lateral retinaculum.    **CARTILAGE:    Patellofemoral:Preserved medial and mild fraying lateral patellar facet cartilage.Median ridge demonstrates no focal abnormality.  Preserved trochlear groove cartilage.  Preservedanterior medial and anterior lateral femoral trochlea facet cartilage.    Medial tibiofemoral: Articular cartilage demonstrates focal fissuring, full-thickness central weight-bearing aspect without subchondral marrow edema.Internal aspect of medial femoral condyle cartilage demonstrates irregularity.    Lateral tibiofemoral: Articular cartilage demonstrates cartilaginous irregularity predominantly tibial level without subchondral marrow edema.Cartilage at posterior medial aspect of lateral tibial plateau is irregular without subchondral edema.    **OTHER:    Bone marrow: No fracture or marrow replacing process.    Miscellaneous: The gastrocnemius muscles are normal.No evident plica thickening.  Impression: Early tricompartmental osteoarthritis given cartilaginous fissuring.  No evidence for subchondral edema or large full-thickness cartilage defects.    Complex tear anterior horn lateral meniscus.    Moderate joint  effusion.    Electronically signed by: Juan Law MD  Date:    01/08/2023  Time:    09:10    Assessment:  1. Complex tear of lat mensc, current injury, right knee, init    2. Primary osteoarthritis of right knee      Plan:  I have reviewed the radiographic and clinical findings with the patient in detail.  There is some early mild degenerative changes present within the knee based on the MRI.  Her x-rays look fairly well preserved.  I suggested that she try conservative treatment and before deciding on surgery.  She will start a course of formal supervised physical therapy.  In addition, I believe she would benefit from Visco supplement injections.  She will return at a later date to start the injection series.  Orders Placed This Encounter    Ambulatory referral/consult to Physical/Occupational Therapy    Prior authorization Order     No follow-ups on file.

## 2023-01-12 ENCOUNTER — CLINICAL SUPPORT (OUTPATIENT)
Dept: REHABILITATION | Facility: HOSPITAL | Age: 53
End: 2023-01-12
Attending: ORTHOPAEDIC SURGERY
Payer: COMMERCIAL

## 2023-01-12 DIAGNOSIS — M17.11 PRIMARY OSTEOARTHRITIS OF RIGHT KNEE: ICD-10-CM

## 2023-01-12 DIAGNOSIS — S83.271A COMPLEX TEAR OF LAT MENSC, CURRENT INJURY, RIGHT KNEE, INIT: ICD-10-CM

## 2023-01-12 DIAGNOSIS — M25.661 DECREASED RANGE OF MOTION (ROM) OF RIGHT KNEE: ICD-10-CM

## 2023-01-12 DIAGNOSIS — R29.898 DECREASED STRENGTH OF LOWER EXTREMITY: ICD-10-CM

## 2023-01-12 PROCEDURE — 97110 THERAPEUTIC EXERCISES: CPT | Mod: PN

## 2023-01-12 PROCEDURE — 97161 PT EVAL LOW COMPLEX 20 MIN: CPT | Mod: PN

## 2023-01-12 NOTE — PLAN OF CARE
[facility]  Physical Therapy Initial Evaluation    Name: Dyan Arredondo  Clinic Number: [id number]    Therapy Diagnosis:   Encounter Diagnoses   Name Primary?    Complex tear of lat mensc, current injury, right knee, init     Primary osteoarthritis of right knee     Decreased range of motion (ROM) of right knee     Decreased strength of lower extremity      Physician: Amilcar Ruiz MD    Physician Orders: PT Eval and Treat   Medical Diagnosis from Referral:   S83.271A (ICD-10-CM) - Complex tear of lat mensc, current injury, right knee, init   M17.11 (ICD-10-CM) - Primary osteoarthritis of right knee     Evaluation Date: 1/12/2023  Authorization Period Expiration: 1/10/24  Plan of Care Expiration: 3/10/23  Visit # / Visits authorized: 1/ 1  FOTO: 1/10    Time In: 9:06  Time Out: 10:00  Total Billable Time: 54 minutes (low Complexity Evaluation, Therapeutic Exercise - 1)    Precautions: Standard    Subjective     Date of onset: 6 years, exacerbation 6 weeks ago     History of current condition - Dyan reports: she recently moved and bought a big bed. She always got up into bed twisting into bed but over time she has developed knee pain with increased pain 6 weeks ago that has not improved on its own.She has had recurrent episodes which resolved on its own about every 6 months.  She is able to sleep in bed. Has some morning discomfort but it worsens towards end of day. She doesn't like pain medications. She has difficulty cleaning home. She works from home and rarely leaves. She has had some clicking but insignificant. She had 2 episodes of giving out when she was tired walking. She did need to take Advil following MRI and walking to appointments. She may schedule an injection if approved by insurance.      Past Medical History:   Diagnosis Date    Allergy     Breast cyst     High risk HPV infection      Dyan Arredondo  has a past surgical history that includes External ear  "surgery; turbanite; Breast cyst excision (Right); and Sinus surgery.    Dyan has a current medication list which includes the following prescription(s): acyclovir, desonide, diphenoxylate-atropine 2.5-0.025 mg, epinephrine, famotidine, fluticasone propionate, hydrocortisone, norgestimate-ethinyl estradiol, and triamcinolone acetonide 0.025%.    Review of patient's allergies indicates:   Allergen Reactions    Amoxicillin trihydrate Rash     Rash and hives     Erythromycin base Rash     Rash and hives     Omnipaque 140 [iohexol] Rash     Rash and hives     Penicillins Rash     Rash and hives     Tetracyclines Hives and Rash     TETRACYCLINE Hydrochloride     Clindamycin Hives        Imaging, bone scan films: " FINDINGS:  No significant degree of tibiofemoral or patellofemoral joint space narrowing is observed.  Osseous structures appear unremarkable, with no evidence of recent or healing fracture, lytic destructive process, osteochondral defect, or other significant abnormality identified.  No significant joint effusion.     Impression:  As above"    MRI: "FINDINGS:  MENISCI:  Medial meniscus: Intact anterior horn, body, and posterior horn.  Lateral meniscus: Complex tear anterior horn lateral meniscus.  Posterior horn intact.  Meniscal root attachment: Intact  Popliteal hiatus, superior and inferior meniscal fascicles:Visualized and intact     LIGAMENTS:  Anterior cruciate ligament: Continuous, with normal signal.  Posterior cruciate ligament: Continuous, with normal signal.  Medial collateral ligament: Intact.  Lateral collateral ligament and  biceps femoris: Intact.  Iliotibial band (ITB): No evidence for ITB syndrome.  Popliteus tendon and popliteofibular ligament: Intact.  Posterior medial and posterior lateral corners: Intact.     TENDONS:  Quadriceps tendon: Intact.  Patella tendon: Intact.  Joint fluid: Moderate effusion.  Hoffa's fat pad: Mild edematous change likely related to tear at the level of the " "anterior horn lateral meniscus..  Intact medial retinaculum/ MPFL.  Intact lateral retinaculum.     CARTILAGE:  Patellofemoral:Preserved medial and mild fraying lateral patellar facet cartilage.Median ridge demonstrates no focal abnormality.  Preserved trochlear groove cartilage.  Preservedanterior medial and anterior lateral femoral trochlea facet cartilage.  Medial tibiofemoral: Articular cartilage demonstrates focal fissuring, full-thickness central weight-bearing aspect without subchondral marrow edema.Internal aspect of medial femoral condyle cartilage demonstrates irregularity.  Lateral tibiofemoral: Articular cartilage demonstrates cartilaginous irregularity predominantly tibial level without subchondral marrow edema.Cartilage at posterior medial aspect of lateral tibial plateau is irregular without subchondral edema.     OTHER:  Bone marrow: No fracture or marrow replacing process.  Miscellaneous: The gastrocnemius muscles are normal.No evident plica thickening.    Impression: Early tricompartmental osteoarthritis given cartilaginous fissuring.  No evidence for subchondral edema or large full-thickness cartilage defects.  Complex tear anterior horn lateral meniscus.  Moderate joint effusion."    Prior Therapy: none  Social History:  lives with their spouse  Occupation: works from home   Prior Level of Function: sedentary lifestyle, independent with ADLs   Current Level of Function: pain with walking/standing     Pain:  Current 1/10, worst 6/10, best 1/10   Location: right knee   Description: Aching and Dull  Aggravating Factors: Standing, Bending, Walking, Night Time, and Getting out of bed/chair  Easing Factors: ice, occasional pain meds    Pts goals: being able to walk and stand long periods of time, go to parades     Objective     Posture: slight knee valgus   Palpation: right knee: suprapatellar swelling, lateral joint line tenderness    AROM: *denotes pain  left knee: 0-125  right knee: 3*-124    PROM: " "*denotes pain  left knee: 1-0-125  right knee: 3*-0-124    L/E Strength w/ MicroFET Muscle Jose Antonio Dynamometer Right Left Pain/Dysfunction with Movement   (approx 4 sec hold w/ max contraction)   Hip Flexion 17.6 kg * 18.8 kg     Hip Abduction 13.2 kg  16.2 kg     Quadriceps 15.6 kg  16.3 kg     Hamstrings 15.1 kg  15 kg       Special tests:   positive McMurrays:Lateral meniscus click and pain     Meniscal tear CPR:  (4/5 LR+= 4.28, 5/5 LR+ 11.2)  History of catching/locking reported   Y  Joint line tenderness                           Y  Pain with bounce knee hyperextension          Y  Pain with maximal knee flexion                      N           Pain/audible click with Grey test            Y (pain)  --> 4/5 (LR 4.28)     30 second sit-to-stand test (without U/E support): 10 ( w/ UE support) pain onset at 8th rep     Gait Analysis: with  decreased gait speed, decreased Terminal knee extension      Impairment/Limitation/Restriction for KOOS, Functional, Daily Living    Therapist reviewed LEFS scores for Dyan Arredondo on 1/12/2023.   LEFS documents entered into TabbedOut - see Media section.    Limitation Score: 36.1    Limitation for FOTO Knee Survey  Limitation Score: 50%         TREATMENT     Treatment Time In: 9:45  Treatment Time Out: 10:00  Total Treatment time separate from Evaluation: 15 minutes    Dyan received therapeutic exercises to develop strength, endurance, ROM, flexibility, posture, and core stabilization for 15 minutes including:  Heel slides 10x  Quad sets 10x 5"  Straight leg raise 8x  Sidelying hip abduction 8x   Questions/answers, education (see below      Home Exercises and Patient Education Provided    Education provided:   Anatomy and Pathology.  Symptom management and plan of care progressions.  Home Exercise Program.  Sleep Hygiene  Nutritional changes to promote decreased inflammation and healing.  Stress management and meditation    Written Home Exercises Provided: yes.  Exercises " were reviewed and Dyan was able to demonstrate them prior to the end of the session.  Dyan demonstrated good  understanding of the education provided.     See EMR under Patient Instructions for exercises provided 1/12/2023.    Assessment     Dyan is a 52 y.o. female referred to outpatient Physical Therapy with a medical diagnosis of Complex tear of lat mensc, current injury, right knee, initial, and primary osteoarthritis of right knee. Pt presents to PT with pain, decreased right knee range of motion with extension deficit, decreased strength, impaired balance and gait, and functional deficits with walking. Pt with positive meniscus cluster (4/5) including lateral joint line tenderness, pain with hyperextension, clicking, and giving out. Pt would benefit from skilled PT consisting of manual therapy including STM/MFR right  knee, patellar mobs, knee flex/ext mobs/stretching; therapeutic exercise including LE strengthening/stretching, postural education, balance and gait training, and modalities prn to address limitations and increase functional mobility.      Pt prognosis is Good.   Pt will benefit from skilled outpatient Physical Therapy to address the deficits stated above and in the chart below, provide pt/family education, and to maximize pt's level of independence.     Plan of care discussed with patient: Yes  Pt's spiritual, cultural and educational needs considered and patient is agreeable to the plan of care and goals as stated below:     Anticipated Barriers for therapy: chronicity     Medical Necessity is demonstrated by the following  History  Co-morbidities and personal factors that may impact the plan of care Co-morbidities:   high BMI    Personal Factors:   lifestyle     low   Examination  Body Structures and Functions, activity limitations and participation restrictions that may impact the plan of care Body Regions:   lower extremities    Body Systems:   "  ROM  strength  balance  gait  transfers  transitions  motor control    Participation Restrictions:   Community ambulation    Activity limitations:   Learning and applying knowledge  no deficits    General Tasks and Commands  no deficits    Communication  no deficits    Mobility  lifting and carrying objects  walking  driving (bike, car, motorcycle)    Self care  dressing    Domestic Life  shopping  cooking  doing house work (cleaning house, washing dishes, laundry)    Interactions/Relationships  no deficits    Life Areas  no deficits    Community and Social Life  community life  recreation and leisure         low   Clinical Presentation evolving clinical presentation with changing clinical characteristics moderate   Decision Making/ Complexity Score: low     Goals:  Short Term Goals:    1. Pt will be independent with HEP supplement PT in improving functional mobility.  2. Pt will improve  right LE strength to at least 75% of left  LE strength as measured via MicroFet handheld dynamometer in order to improve functional mobility  3. Pt will improve right knee extension AROM to at least 0 in order to improve gait.    Long Term Goals:  1. Pt will be independent with updated HEP supplement PT in improving functional mobility.  2. Pt will improve right  LE strength to at least 90% of left LE strength as measured via MicroFet handheld dynamometer in order to improve functional mobility  3. Pt will improve FOTO knee survey score to </= 32% limited in order to demo improved functional mobility  4. Pt will improve score on LEFS by 9 pts in order to demo improved functional mobility  5.  Pt will perform at least 12 sit to stands without UE support on 30 second sit to stand test in order to demo improved ability to perform transfers      30" sit to stand Cutoff Scores:15          Plan     Plan of care Certification: 1/12/2023 to 3/10/23.    Outpatient Physical Therapy 2 times weekly for 8 weeks to include the following " interventions: Gait Training, Manual Therapy, Moist Heat/ Ice, Neuromuscular Re-ed, Orthotic Management and Training, Patient Education, Therapeutic Activites and Therapeutic Exercise, ASTYM, Kinesiotape    GUILLERMO BHAT, PT, DPT    I certify the need for these services furnished under this plan of treatment and while under my care.        Amilcar Ruiz MD, FAAOS  , Orthopaedic Sports Medicine  Residency   [facility] [place] Department of Orthopaedic Surgery  Assistant Orthopaedic Surgeon, [facility]  Head Team Physician, [facility]

## 2023-01-17 ENCOUNTER — CLINICAL SUPPORT (OUTPATIENT)
Dept: REHABILITATION | Facility: HOSPITAL | Age: 53
End: 2023-01-17
Attending: ORTHOPAEDIC SURGERY
Payer: COMMERCIAL

## 2023-01-17 DIAGNOSIS — R29.898 DECREASED STRENGTH OF LOWER EXTREMITY: ICD-10-CM

## 2023-01-17 DIAGNOSIS — M25.661 DECREASED RANGE OF MOTION (ROM) OF RIGHT KNEE: Primary | ICD-10-CM

## 2023-01-17 PROCEDURE — 97110 THERAPEUTIC EXERCISES: CPT | Mod: PN,CQ

## 2023-01-17 NOTE — PROGRESS NOTES
"  Physical Therapy Daily Treatment Note     Name: Dyan Arredondo  Clinic Number: [id number]    Therapy Diagnosis:   Encounter Diagnoses   Name Primary?    Decreased range of motion (ROM) of right knee Yes    Decreased strength of lower extremity      Physician: Amilcar Ruiz MD    Visit Date: 1/17/2023    Physician Orders: PT Eval and Treat   Medical Diagnosis from Referral:   S83.271A (ICD-10-CM) - Complex tear of lat mensc, current injury, right knee, init   M17.11 (ICD-10-CM) - Primary osteoarthritis of right knee      Evaluation Date: 1/12/2023  Authorization Period Expiration: 1/10/24  Plan of Care Expiration: 3/10/23  Visit # / Visits authorized: 2/ 1  FOTO: 2/10    Time In: 11:05 am  Time Out: 12:00 pm  Total Billable Time: 55 minutes TEx4    Precautions: Standard    Subjective     Pt reports: minimal right posterior knee pain upon presenting to clinic  She was compliant with home exercise program.  Response to previous treatment: no adverse effects  Functional change: ongoing    Pain: 3/10  Location: right knee      Objective       Dyan received therapeutic exercises to develop strength, endurance, ROM, and flexibility for 55 minutes including:  Heel slides: 2x10 right with strap and board.    Quad sets :  20x 5"  Straight leg raise 2x10 B  Gluteal sets: 5" x 10  Bridge 2x10 glut focus  Sidelying hip abduction 2x10 R  Clamshell 3" hold 2x10 R  Standing hip abduction red theraband 2x10  Standing hip extension 2x10 B      Home Exercises Provided and Patient Education Provided     Education provided:   - Reviewed all therex for performance with best technique  - Importance of daily performance of home exercise program  - Pain management with use of ice and elevation as well as gentle oscillations     Written Home Exercises Provided: Patient instructed to cont prior HEP.  Exercises were reviewed and Dyan was able to demonstrate them prior to the end of the session.  Dyan demonstrated good  " "understanding of the education provided.     See EMR under Patient Instructions for exercises provided  at eval on 1/12/2023 .      Assessment     Patient required complete review of home exercise program for best technique with performance of therex.  Able to complete same with slight increase of pain mid treatment to "4/10" and decreased pain at end of session to "1/10"  Dyan Is progressing well towards her goals.   Pt prognosis is Good.     Pt will continue to benefit from skilled outpatient physical therapy to address the deficits listed in the problem list box on initial evaluation, provide pt/family education and to maximize pt's level of independence in the home and community environment.     Pt's spiritual, cultural and educational needs considered and pt agreeable to plan of care and goals.    Anticipated barriers to physical therapy: chronicity    Goals:   Short Term Goals:     1. Pt will be independent with HEP supplement PT in improving functional mobility.  2. Pt will improve  right LE strength to at least 75% of left  LE strength as measured via MicroFet handheld dynamometer in order to improve functional mobility  3. Pt will improve right knee extension AROM to at least 0 in order to improve gait.     Long Term Goals:  1. Pt will be independent with updated HEP supplement PT in improving functional mobility.  2. Pt will improve right  LE strength to at least 90% of left LE strength as measured via MicroFet handheld dynamometer in order to improve functional mobility  3. Pt will improve FOTO knee survey score to </= 32% limited in order to demo improved functional mobility  4. Pt will improve score on LEFS by 9 pts in order to demo improved functional mobility  5.  Pt will perform at least 12 sit to stands without UE support on 30 second sit to stand test in order to demo improved ability to perform transfers       Plan     Continue PT per plan of care, progress as appropriate.    Saima Galvin, PTA "

## 2023-01-19 ENCOUNTER — CLINICAL SUPPORT (OUTPATIENT)
Dept: REHABILITATION | Facility: HOSPITAL | Age: 53
End: 2023-01-19
Attending: ORTHOPAEDIC SURGERY
Payer: COMMERCIAL

## 2023-01-19 DIAGNOSIS — M25.661 DECREASED RANGE OF MOTION (ROM) OF RIGHT KNEE: Primary | ICD-10-CM

## 2023-01-19 DIAGNOSIS — R29.898 DECREASED STRENGTH OF LOWER EXTREMITY: ICD-10-CM

## 2023-01-19 PROCEDURE — 97140 MANUAL THERAPY 1/> REGIONS: CPT | Mod: PN,CQ

## 2023-01-19 NOTE — PROGRESS NOTES
"  Physical Therapy Daily Treatment Note     [id number]    Therapy Diagnosis:   Encounter Diagnoses   Name Primary?    Decreased range of motion (ROM) of right knee Yes    Decreased strength of lower extremity      Physician: Amilcar Ruiz MD    Visit Date: 1/19/2023    Physician Orders: PT Eval and Treat   Medical Diagnosis from Referral:   S83.271A (ICD-10-CM) - Complex tear of lat mensc, current injury, right knee, init   M17.11 (ICD-10-CM) - Primary osteoarthritis of right knee      Evaluation Date: 1/12/2023  Authorization Period Expiration: 12/31/2023  Plan of Care Expiration: 3/10/23  Visit # / Visits authorized: 3/20  FOTO: 3/10    Time In: 11:04 am  Time Out: 12:00 pm  Total Billable Time: 56 minutes TEx3, MTx1    Precautions: Standard    Subjective     Pt reports: overall decreased pain since last visit.  She was compliant with home exercise program.  Response to previous treatment: decreased knee pain  Functional change: ongoing    Pain: 3/10  Location: right knee      Objective       Dyan received therapeutic exercises to develop strength, endurance, ROM, and flexibility for 48 minutes including:  Recumbent bike:  Level 1 x 5 minutes  Heel slides: 2x10 right with strap and board.    Quad sets :  20x 5"  Straight leg raise 2x10 B  Gluteal sets: 5" x 10  Bridge 2x10 glut focus  Sidelying hip abduction 2x10 R  Clamshell 3" hold 2x10 R  Standing hip abduction red theraband 2x10  Standing hip extension 2x10 bilateral  Standing eel raises:  2x10 DL  Sit to stand from chair:  1x10 no upper extremity support  Leg Press:  Sled 4, 4 plates 2x10 double leg    Dyan received the following manual therapy techniques: Joint mobilizations, Myofacial release, and Soft tissue Mobilization were applied to the: right knee for 8 minutes, including:  STM/MFR to posterior right knee focus popliteal space and distal hamstrings  Patellar mobs all directions  STM/MFR distal quad and " "medial / lateral patellar areas  Passive range of motion into physiological flexion and extension with gentle oscillations at end range.     Home Exercises Provided and Patient Education Provided     Education provided:   - Reviewed all therex for performance with best technique  - Importance of daily performance of home exercise program  - Pain management with use of ice and elevation as well as gentle oscillations     Written Home Exercises Provided: Patient instructed to cont prior HEP.  Exercises were reviewed and Dyan was able to demonstrate them prior to the end of the session.  Dyan demonstrated good  understanding of the education provided.     See EMR under Patient Instructions for exercises provided  at [facility] on 1/12/2023 .      Assessment     Patient able to perform all therex including progressions as bolded with slight increased pain which was moderated with manual therapy as noted.  Fatigued with standing therex. Minimal complaint of increased pain after treatment rated "2/10" at end of treatment.  Dyan Is progressing well towards her goals.   Pt prognosis is Good.     Pt will continue to benefit from skilled outpatient physical therapy to address the deficits listed in the problem list box on initial evaluation, provide pt/family education and to maximize pt's level of independence in the home and community environment.     Pt's spiritual, cultural and educational needs considered and pt agreeable to plan of care and goals.    Anticipated barriers to physical therapy: chronicity    Goals:   Short Term Goals:     1. Pt will be independent with HEP supplement PT in improving functional mobility.  2. Pt will improve  right LE strength to at least 75% of left  LE strength as measured via MicroFet handheld dynamometer in order to improve functional mobility  3. Pt will improve right knee extension AROM to at least 0 in order to improve gait.     Long Term Goals:  1. Pt will be independent with " updated HEP supplement PT in improving functional mobility.  2. Pt will improve right  LE strength to at least 90% of left LE strength as measured via MicroFet handheld dynamometer in order to improve functional mobility  3. Pt will improve FOTO knee survey score to </= 32% limited in order to demo improved functional mobility  4. Pt will improve score on LEFS by 9 pts in order to demo improved functional mobility  5.  Pt will perform at least 12 sit to stands without UE support on 30 second sit to stand test in order to demo improved ability to perform transfers       Plan     Continue PT per plan of care, progress as appropriate.    Saima Galvin, PTA

## 2023-01-23 ENCOUNTER — CLINICAL SUPPORT (OUTPATIENT)
Dept: REHABILITATION | Facility: HOSPITAL | Age: 53
End: 2023-01-23
Attending: ORTHOPAEDIC SURGERY
Payer: COMMERCIAL

## 2023-01-23 DIAGNOSIS — M25.661 DECREASED RANGE OF MOTION (ROM) OF RIGHT KNEE: Primary | ICD-10-CM

## 2023-01-23 DIAGNOSIS — R29.898 DECREASED STRENGTH OF LOWER EXTREMITY: ICD-10-CM

## 2023-01-23 PROCEDURE — 97110 THERAPEUTIC EXERCISES: CPT | Mod: PN,CQ

## 2023-01-23 PROCEDURE — 97140 MANUAL THERAPY 1/> REGIONS: CPT | Mod: PN,CQ

## 2023-01-23 NOTE — PROGRESS NOTES
"  Physical Therapy Daily Treatment Note     Name: Dyan [facility] Number: [id number]    Therapy Diagnosis:   No diagnosis found.    Physician: Amilcar Ruiz MD    Visit Date: 1/23/2023    Physician Orders: PT Eval and Treat   Medical Diagnosis from Referral:   S83.271A (ICD-10-CM) - Complex tear of lat mensc, current injury, right knee, init   M17.11 (ICD-10-CM) - Primary osteoarthritis of right knee      Evaluation Date: 1/12/2023  Authorization Period Expiration: 12/31/2023  Plan of Care Expiration: 3/10/23  Visit # / Visits authorized: 3/20  FOTO: 3/10    Time In: 11:04 am  Time Out: 12:00 pm  Total Billable Time: 56 minutes TEx3, MTx1    Precautions: Standard    Subjective     Pt reports: overall decreased pain since last visit.  She was compliant with home exercise program.  Response to previous treatment: decreased knee pain  Functional change: ongoing    Pain: 3/10  Location: right knee      Objective       Dyan received therapeutic exercises to develop strength, endurance, ROM, and flexibility for 48 minutes including:  Recumbent bike:  Level 1 x 5 minutes  Heel slides: 2x10 right with strap and board.    Quad sets :  20x 5"  Straight leg raise 2x10 B  Gluteal sets: 5" x 10  Bridge 2x10 glut focus  Sidelying hip abduction 2x10 R  Clamshell 3" hold 2x10 R  Standing hip abduction red theraband 2x10  Standing hip extension 2x10 bilateral  Standing eel raises:  2x10 DL  Sit to stand from chair:  1x10 no upper extremity support  Leg Press:  Sled 4, 4 plates 2x10 double leg    Dyan received the following manual therapy techniques: Joint mobilizations, Myofacial release, and Soft tissue Mobilization were applied to the: right knee for 8 minutes, including:  STM/MFR to posterior right knee focus popliteal space and distal hamstrings  Patellar mobs all directions  STM/MFR distal quad and medial / lateral patellar areas  Passive range of motion into physiological flexion and extension with gentle " "oscillations at end range.     Home Exercises Provided and Patient Education Provided     Education provided:   - Reviewed all therex for performance with best technique  - Importance of daily performance of home exercise program  - Pain management with use of ice and elevation as well as gentle oscillations     Written Home Exercises Provided: Patient instructed to cont prior HEP.  Exercises were reviewed and Dyan was able to demonstrate them prior to the end of the session.  Dyan demonstrated good  understanding of the education provided.     See EMR under Patient Instructions for exercises provided  at [facility] on 1/12/2023 .      Assessment     Patient able to perform all therex including progressions as bolded with slight increased pain which was moderated with manual therapy as noted.  Fatigued with standing therex. Minimal complaint of increased pain after treatment rated "2/10" at end of treatment.  Dyan Is progressing well towards her goals.   Pt prognosis is Good.     Pt will continue to benefit from skilled outpatient physical therapy to address the deficits listed in the problem list box on initial evaluation, provide pt/family education and to maximize pt's level of independence in the home and community environment.     Pt's spiritual, cultural and educational needs considered and pt agreeable to plan of care and goals.    Anticipated barriers to physical therapy: chronicity    Goals:   Short Term Goals:     1. Pt will be independent with HEP supplement PT in improving functional mobility.  2. Pt will improve  right LE strength to at least 75% of left  LE strength as measured via MicroFet handheld dynamometer in order to improve functional mobility  3. Pt will improve right knee extension AROM to at least 0 in order to improve gait.     Long Term Goals:  1. Pt will be independent with updated HEP supplement PT in improving functional mobility.  2. Pt will improve right  LE strength to at least 90% " of left LE strength as measured via MicroFet handheld dynamometer in order to improve functional mobility  3. Pt will improve FOTO knee survey score to </= 32% limited in order to demo improved functional mobility  4. Pt will improve score on LEFS by 9 pts in order to demo improved functional mobility  5.  Pt will perform at least 12 sit to stands without UE support on 30 second sit to stand test in order to demo improved ability to perform transfers       Plan     Continue PT per plan of care, progress as appropriate.    GUILLERMO BHAT, PT

## 2023-01-23 NOTE — PROGRESS NOTES
"  Physical Therapy Daily Treatment Note     Name: Dyan [facility] Number: [id number]    Therapy Diagnosis:   Encounter Diagnoses   Name Primary?    Decreased range of motion (ROM) of right knee Yes    Decreased strength of lower extremity      Physician: Amilcar Ruiz MD    Visit Date: 1/23/2023    Physician Orders: PT Eval and Treat   Medical Diagnosis from Referral:   S83.271A (ICD-10-CM) - Complex tear of lat mensc, current injury, right knee, init   M17.11 (ICD-10-CM) - Primary osteoarthritis of right knee      Evaluation Date: 1/12/2023  Authorization Period Expiration: 12/31/2023  Plan of Care Expiration: 3/10/23  Visit # / Visits authorized: 4/20  FOTO: 3/10  PTA visit: 1/5    Time In: 11:06 am  Time Out: 12:00 pm  Total Billable Time: 54 minutes TEx3, MTx1    Precautions: Standard    Subjective     Pt reports: increased knee pain today at 3/10 which is considered high for her. Pt stated that she has a hard time finding a comfortable position for her knee while working at home. Pt stated that she works seated mostly but on a high chair like a [employer]. Pt reported that she did not do her exercises over the weekend and also did not get good sleep the last two nights as that may have contributed to her exacerbation in pain.   She was compliant with home exercise program.  Response to previous treatment: decreased knee pain  Functional change: ongoing    Pain: 3/10  Location: right knee      Objective       Dyan received therapeutic exercises to develop strength, endurance, ROM, and flexibility for 31 minutes including:  Recumbent bike:  Level 2 x 5 minutes, seat #14  Heel slides: 2x10 right with strap and board.    Quad sets :  20x 5"  Straight leg raise 2x10 B  Gluteal sets: 5" x 10 -> not today  Bridge 2x10 glut focus  Sidelying hip abduction 2x10 R  Clamshell 3" hold 2x10 R  Standing hip abduction red theraband 2x10 -> not today  Standing hip extension 2x10 bilateral ->not today   Standing " heel raises:  2x10 DL  Sit to stand from chair:  1x10 no upper extremity support  Leg Press:  Sled 4, 4 plates 2x10 double leg   Side steps: red band 2 laps (band above knees)    Dyan received the following manual therapy techniques: Joint mobilizations, Myofacial release, and Soft tissue Mobilization were applied to the: right knee for 23 minutes, including:  STM/MFR to posterior right knee focus popliteal space and distal hamstrings  Patellar mobs all directions    Not today:  STM/MFR distal quad and medial / lateral patellar areas  Passive range of motion into physiological flexion and extension with gentle oscillations at end range.     Home Exercises Provided and Patient Education Provided     Education provided:   - seated exercises that can be performed at desk at work  - techniques to prop up LE + advised walking breaks during work as able   - Pain management with use of ice and elevation    Written Home Exercises Provided: Patient instructed to cont prior HEP.  Exercises were reviewed and Dyan was able to demonstrate them prior to the end of the session.  Dyan demonstrated good  understanding of the education provided.     See EMR under Patient Instructions for exercises provided  at [facility] on 1/12/2023 .      Assessment     Pt w/ good leonardo of instructed exercises today. Pt w/ no c/o increased pain throughout tx. Pt required min verbal cues throughout tx to complete exercises w/ proper technique. Pt w/ good response to MT at the beginning of tx prior to exercise as pt reported relief and decreased pain. Pt reported feeling much better post tx today.     Dyan Is progressing well towards her goals.   Pt prognosis is Good.     Pt will continue to benefit from skilled outpatient physical therapy to address the deficits listed in the problem list box on initial evaluation, provide pt/family education and to maximize pt's level of independence in the home and community environment.     Pt's spiritual,  cultural and educational needs considered and pt agreeable to plan of care and goals.    Anticipated barriers to physical therapy: chronicity    Goals:   Short Term Goals:     1. Pt will be independent with HEP supplement PT in improving functional mobility.  2. Pt will improve  right LE strength to at least 75% of left  LE strength as measured via MicroFet handheld dynamometer in order to improve functional mobility  3. Pt will improve right knee extension AROM to at least 0 in order to improve gait.     Long Term Goals:  1. Pt will be independent with updated HEP supplement PT in improving functional mobility.  2. Pt will improve right  LE strength to at least 90% of left LE strength as measured via MicroFet handheld dynamometer in order to improve functional mobility  3. Pt will improve FOTO knee survey score to </= 32% limited in order to demo improved functional mobility  4. Pt will improve score on LEFS by 9 pts in order to demo improved functional mobility  5.  Pt will perform at least 12 sit to stands without UE support on 30 second sit to stand test in order to demo improved ability to perform transfers       Plan     Continue PT per plan of care, progress as appropriate.    Tessy Hernandez, PTA

## 2023-01-26 ENCOUNTER — CLINICAL SUPPORT (OUTPATIENT)
Dept: REHABILITATION | Facility: HOSPITAL | Age: 53
End: 2023-01-26
Attending: ORTHOPAEDIC SURGERY
Payer: COMMERCIAL

## 2023-01-26 DIAGNOSIS — M25.661 DECREASED RANGE OF MOTION (ROM) OF RIGHT KNEE: Primary | ICD-10-CM

## 2023-01-26 DIAGNOSIS — R29.898 DECREASED STRENGTH OF LOWER EXTREMITY: ICD-10-CM

## 2023-01-26 PROCEDURE — 97110 THERAPEUTIC EXERCISES: CPT | Mod: PN

## 2023-01-26 PROCEDURE — 97140 MANUAL THERAPY 1/> REGIONS: CPT | Mod: PN

## 2023-01-26 NOTE — PROGRESS NOTES
"  Physical Therapy Daily Treatment Note     Name: Dyan [facility] Number: [id number]    Therapy Diagnosis:   No diagnosis found.    Physician: Amilcar Ruiz MD    Visit Date: 1/26/2023    Physician Orders: PT Eval and Treat   Medical Diagnosis from Referral:   S83.271A (ICD-10-CM) - Complex tear of lat mensc, current injury, right knee, init   M17.11 (ICD-10-CM) - Primary osteoarthritis of right knee      Evaluation Date: 1/12/2023  Authorization Period Expiration: 12/31/2023  Plan of Care Expiration: 3/10/23  Visit # / Visits authorized: 5/20  FOTO: 4/10  PTA visit: 0/5    Time In: 11:01 am  Time Out: 12:00 pm  Total Billable Time: 59 minutes TEx3, MTx1    Precautions: Standard    Subjective     Pt reports: increased knee pain today at 3/10 which is considered high for her. Pt stated that she has a hard time finding a comfortable position for her knee while working at home. Pt stated that she works seated mostly but on a high chair like a [employer]. Pt reported that she did not do her exercises over the weekend and also did not get good sleep the last two nights as that may have contributed to her exacerbation in pain.   She was compliant with home exercise program.  Response to previous treatment: decreased knee pain  Functional change: ongoing    Pain: 0/10  Location: right knee      Objective       Dyan received therapeutic exercises to develop strength, endurance, ROM, and flexibility for 50 minutes including:  Recumbent bike:  Level 2 x 6 minutes, seat #14  Heel slides: 2x10 right with strap and board.    Quad sets :  20x 5"  Straight leg raise 2x10 B  Gluteal sets: 5" x 10 -> not today  Bridge 2x10 glut focus  Sidelying hip abduction 2x10 R  Clamshell 3" hold 2x10 R  Standing hip abduction red theraband 2x10   Standing hip extension 2x10 bilateral   Standing hip flexion red theraband 2 x 10   Standing heel raises:  2x10 DL  Sit to stand from chair:  1x10 no upper extremity support  Leg Press:  " "Sled 4, 6 plates 3x10 double leg   Side steps: red band 3 laps (band above knees)  Long sit hamstring stretch 3 x 30"  Sitting calf stretch with strap 3 x 30"    Dyan received the following manual therapy techniques: Joint mobilizations, Myofacial release, and Soft tissue Mobilization were applied to the: right knee for 6 minutes, including:  STM/MFR to posterior right knee focus popliteal space and distal hamstrings  Patellar mobs all directions    Not today:  STM/MFR distal quad and medial / lateral patellar areas  Passive range of motion into physiological flexion and extension with gentle oscillations at end range.     Home Exercises Provided and Patient Education Provided     Education provided:   - seated exercises that can be performed at desk at work  - techniques to prop up LE + advised walking breaks during work as able   - Pain management with use of ice and elevation    Written Home Exercises Provided: Patient instructed to cont prior HEP.  Exercises were reviewed and Dyan was able to demonstrate them prior to the end of the session.  Dyan demonstrated good  understanding of the education provided.     See EMR under Patient Instructions for exercises provided  at [facility] on 1/12/2023 .      Assessment     Patient had no increased pain throughout therapy, required some verbal cueing for exercises such as standing hip abduction, heel slides, and lateral ambulation. Progressed multiple exercises including leg press, sidewalking, and added hamstring and calf stretches with good tolerance    Dyan Is progressing well towards her goals.   Pt prognosis is Good.     Pt will continue to benefit from skilled outpatient physical therapy to address the deficits listed in the problem list box on initial evaluation, provide pt/family education and to maximize pt's level of independence in the home and community environment.     Pt's spiritual, cultural and educational needs considered and pt agreeable to plan " of care and goals.    Anticipated barriers to physical therapy: chronicity    Goals:   Short Term Goals:     1. Pt will be independent with HEP supplement PT in improving functional mobility.  2. Pt will improve  right LE strength to at least 75% of left  LE strength as measured via MicroFet handheld dynamometer in order to improve functional mobility  3. Pt will improve right knee extension AROM to at least 0 in order to improve gait.     Long Term Goals:  1. Pt will be independent with updated HEP supplement PT in improving functional mobility.  2. Pt will improve right  LE strength to at least 90% of left LE strength as measured via MicroFet handheld dynamometer in order to improve functional mobility  3. Pt will improve FOTO knee survey score to </= 32% limited in order to demo improved functional mobility  4. Pt will improve score on LEFS by 9 pts in order to demo improved functional mobility  5.  Pt will perform at least 12 sit to stands without UE support on 30 second sit to stand test in order to demo improved ability to perform transfers       Plan     Continue PT per plan of care, progress as appropriate.    Mark Maddox, PT

## 2023-01-27 ENCOUNTER — DOCUMENTATION ONLY (OUTPATIENT)
Dept: REHABILITATION | Facility: HOSPITAL | Age: 53
End: 2023-01-27
Payer: COMMERCIAL

## 2023-01-27 NOTE — PROGRESS NOTES
Physical therapist and physical therapy assistant(s) met face to face to discuss patient's treatment plan and progress towards established goals. Pt will be seen by a physical therapist minimally every 6th visit or every 30 days.    GUILLERMO BHAT, PT, DPT    Saima Galvin, PTA

## 2023-01-30 ENCOUNTER — CLINICAL SUPPORT (OUTPATIENT)
Dept: REHABILITATION | Facility: HOSPITAL | Age: 53
End: 2023-01-30
Attending: ORTHOPAEDIC SURGERY
Payer: COMMERCIAL

## 2023-01-30 DIAGNOSIS — M25.661 DECREASED RANGE OF MOTION (ROM) OF RIGHT KNEE: Primary | ICD-10-CM

## 2023-01-30 DIAGNOSIS — R29.898 DECREASED STRENGTH OF LOWER EXTREMITY: ICD-10-CM

## 2023-01-30 PROCEDURE — 97140 MANUAL THERAPY 1/> REGIONS: CPT | Mod: PN

## 2023-01-30 PROCEDURE — 97110 THERAPEUTIC EXERCISES: CPT | Mod: PN

## 2023-01-30 NOTE — PROGRESS NOTES
"  Physical Therapy Daily Treatment Note     [id number]    Therapy Diagnosis:   Encounter Diagnoses   Name Primary?    Decreased range of motion (ROM) of right knee Yes    Decreased strength of lower extremity        Physician: Amilcar Ruiz MD    Visit Date: 1/30/2023    Physician Orders: PT Eval and Treat   Medical Diagnosis from Referral:   S83.271A (ICD-10-CM) - Complex tear of lat mensc, current injury, right knee, init   M17.11 (ICD-10-CM) - Primary osteoarthritis of right knee      Evaluation Date: 1/12/2023  Authorization Period Expiration: 12/31/2023  Plan of Care Expiration: 3/10/23  Visit # / Visits authorized: 5/20  FOTO: 5/10- Next  PTA visit: 0/5    Time In: 12:00 pm  Time Out: 12:55 pm  Total Billable Time: 55 minutes TEx3, MTx1    Precautions: Standard    Subjective     Pt reports: she did the exercises over the weekend and is feeling it more today. She usually feels good for about 3 days following PT. She has been having some knee popping occasionally.   She was compliant with home exercise program.  Response to previous treatment: decreased knee pain  Functional change: ongoing    Pain: 1/10  Location: right knee      Objective   See objective measures at plan of care   Treatment     Dyan received therapeutic exercises to develop strength, endurance, ROM, and flexibility for  45 minutes including:  Recumbent bike:  Level 2 x 6 minutes, seat #14 NP- not available  Heel slides: 2x10 right with strap and board.  NP   Long arc quad 2x10 bilateral   Quad sets :  20x 5" NP  Straight leg raise 2x10 B  Bridge 2x10 3-5" glute focus  Sidelying hip abduction 2x10 R  Clamshell 3" hold 2x10 R  Standing hip abduction red theraband 2x10   Standing hip extension 2x10 bilateral   Standing hip flexion red theraband 2 x 10   Standing heel raises:  2x10 DL  Sit to stand from chair:  2x10 no upper extremity support  Leg Press:  Sled 4, 6 plates 3x10 double leg   Side steps: " "red band 3 laps (band above knees)  Long sit hamstring stretch 3 x 30" (table low)  Sitting calf stretch with strap 3 x 30"  Cable walk backs 10x 10lbs  Stair lunges 15x3"     Dyan received the following manual therapy techniques: Joint mobilizations, Myofacial release, and Soft tissue Mobilization were applied to the: right knee for 10 minutes, including:  Patellar mobs all directions  Tibiofemoral distraction supine and seated with MWM     Not today:  STM/MFR distal quad and medial / lateral patellar areas  Passive range of motion into physiological flexion and extension with gentle oscillations at end range.     Home Exercises Provided and Patient Education Provided     Education provided:   - seated exercises that can be performed at desk at work  - techniques to prop up LE + advised walking breaks during work as able   - Pain management with use of ice and elevation    Written Home Exercises Provided: Patient instructed to cont prior HEP.  Exercises were reviewed and Dyan was able to demonstrate them prior to the end of the session.  Dyan demonstrated good  understanding of the education provided.     See EMR under Patient Instructions for exercises provided  at [facility] on 1/12/2023 .      Assessment   Pt presents with low grade pain and tenderness of joint line. Performed joint mobilizations to reduce knee pressure with good tolerance. Min pain reported with exercises which resolved with slight position modifications. Monitor response to today's progressions of standing there-ex.     Dyan Is progressing well towards her goals.   Pt prognosis is Good.     Pt will continue to benefit from skilled outpatient physical therapy to address the deficits listed in the problem list box on initial evaluation, provide pt/family education and to maximize pt's level of independence in the home and community environment.     Pt's spiritual, cultural and educational needs considered and pt agreeable to plan of care " and goals.    Anticipated barriers to physical therapy: chronicity    Goals:   Short Term Goals:     1. Pt will be independent with HEP supplement PT in improving functional mobility.  2. Pt will improve  right LE strength to at least 75% of left  LE strength as measured via MicroFet handheld dynamometer in order to improve functional mobility  3. Pt will improve right knee extension AROM to at least 0 in order to improve gait.     Long Term Goals:  1. Pt will be independent with updated HEP supplement PT in improving functional mobility.  2. Pt will improve right  LE strength to at least 90% of left LE strength as measured via MicroFet handheld dynamometer in order to improve functional mobility  3. Pt will improve FOTO knee survey score to </= 32% limited in order to demo improved functional mobility  4. Pt will improve score on LEFS by 9 pts in order to demo improved functional mobility  5.  Pt will perform at least 12 sit to stands without UE support on 30 second sit to stand test in order to demo improved ability to perform transfers       Plan     Continue PT per plan of care, progress as appropriate.    GUILLERMO BHAT, PT

## 2023-01-31 ENCOUNTER — OFFICE VISIT (OUTPATIENT)
Dept: INTERNAL MEDICINE | Facility: CLINIC | Age: 53
End: 2023-01-31
Payer: COMMERCIAL

## 2023-01-31 ENCOUNTER — LAB VISIT (OUTPATIENT)
Dept: LAB | Facility: HOSPITAL | Age: 53
End: 2023-01-31
Attending: INTERNAL MEDICINE
Payer: COMMERCIAL

## 2023-01-31 VITALS
DIASTOLIC BLOOD PRESSURE: 86 MMHG | WEIGHT: 239 LBS | OXYGEN SATURATION: 99 % | SYSTOLIC BLOOD PRESSURE: 142 MMHG | HEIGHT: 68 IN | BODY MASS INDEX: 36.22 KG/M2 | HEART RATE: 99 BPM

## 2023-01-31 DIAGNOSIS — Z23 NEED FOR MMR VACCINE: ICD-10-CM

## 2023-01-31 DIAGNOSIS — B00.1 COLD SORE: ICD-10-CM

## 2023-01-31 DIAGNOSIS — J30.9 ALLERGIC RHINITIS, UNSPECIFIED SEASONALITY, UNSPECIFIED TRIGGER: ICD-10-CM

## 2023-01-31 DIAGNOSIS — Z23 NEED FOR INFLUENZA VACCINATION: ICD-10-CM

## 2023-01-31 DIAGNOSIS — Z00.00 ANNUAL PHYSICAL EXAM: ICD-10-CM

## 2023-01-31 DIAGNOSIS — R10.9 ABDOMINAL PAIN, UNSPECIFIED ABDOMINAL LOCATION: ICD-10-CM

## 2023-01-31 DIAGNOSIS — I10 ESSENTIAL HYPERTENSION: ICD-10-CM

## 2023-01-31 DIAGNOSIS — J06.9 UPPER RESPIRATORY TRACT INFECTION, UNSPECIFIED TYPE: ICD-10-CM

## 2023-01-31 DIAGNOSIS — Z30.9 ENCOUNTER FOR CONTRACEPTIVE MANAGEMENT, UNSPECIFIED TYPE: ICD-10-CM

## 2023-01-31 DIAGNOSIS — Z12.11 ENCOUNTER FOR SCREENING COLONOSCOPY: ICD-10-CM

## 2023-01-31 DIAGNOSIS — S83.271A COMPLEX TEAR OF LATERAL MENISCUS OF RIGHT KNEE AS CURRENT INJURY, INITIAL ENCOUNTER: ICD-10-CM

## 2023-01-31 DIAGNOSIS — N39.0 RECURRENT UTI: ICD-10-CM

## 2023-01-31 DIAGNOSIS — K21.9 GASTROESOPHAGEAL REFLUX DISEASE WITHOUT ESOPHAGITIS: ICD-10-CM

## 2023-01-31 DIAGNOSIS — M62.830 BACK SPASM: ICD-10-CM

## 2023-01-31 LAB
ALBUMIN SERPL BCP-MCNC: 4 G/DL (ref 3.5–5.2)
ALP SERPL-CCNC: 63 U/L (ref 55–135)
ALT SERPL W/O P-5'-P-CCNC: 8 U/L (ref 10–44)
ANION GAP SERPL CALC-SCNC: 7 MMOL/L (ref 8–16)
AST SERPL-CCNC: 14 U/L (ref 10–40)
BASOPHILS # BLD AUTO: 0.1 K/UL (ref 0–0.2)
BASOPHILS NFR BLD: 1 % (ref 0–1.9)
BILIRUB SERPL-MCNC: 0.4 MG/DL (ref 0.1–1)
BUN SERPL-MCNC: 7 MG/DL (ref 6–20)
CALCIUM SERPL-MCNC: 9.6 MG/DL (ref 8.7–10.5)
CHLORIDE SERPL-SCNC: 108 MMOL/L (ref 95–110)
CHOLEST SERPL-MCNC: 179 MG/DL (ref 120–199)
CHOLEST/HDLC SERPL: 2.3 {RATIO} (ref 2–5)
CO2 SERPL-SCNC: 22 MMOL/L (ref 23–29)
CREAT SERPL-MCNC: 0.6 MG/DL (ref 0.5–1.4)
DIFFERENTIAL METHOD: ABNORMAL
EOSINOPHIL # BLD AUTO: 0.3 K/UL (ref 0–0.5)
EOSINOPHIL NFR BLD: 2.8 % (ref 0–8)
ERYTHROCYTE [DISTWIDTH] IN BLOOD BY AUTOMATED COUNT: 13.1 % (ref 11.5–14.5)
EST. GFR  (NO RACE VARIABLE): >60 ML/MIN/1.73 M^2
ESTIMATED AVG GLUCOSE: 100 MG/DL (ref 68–131)
GLUCOSE SERPL-MCNC: 85 MG/DL (ref 70–110)
HBA1C MFR BLD: 5.1 % (ref 4–5.6)
HCT VFR BLD AUTO: 44.2 % (ref 37–48.5)
HDLC SERPL-MCNC: 78 MG/DL (ref 40–75)
HDLC SERPL: 43.6 % (ref 20–50)
HGB BLD-MCNC: 13.5 G/DL (ref 12–16)
IMM GRANULOCYTES # BLD AUTO: 0.02 K/UL (ref 0–0.04)
IMM GRANULOCYTES NFR BLD AUTO: 0.2 % (ref 0–0.5)
LDLC SERPL CALC-MCNC: 84.6 MG/DL (ref 63–159)
LYMPHOCYTES # BLD AUTO: 2.4 K/UL (ref 1–4.8)
LYMPHOCYTES NFR BLD: 24.9 % (ref 18–48)
MCH RBC QN AUTO: 29.8 PG (ref 27–31)
MCHC RBC AUTO-ENTMCNC: 30.5 G/DL (ref 32–36)
MCV RBC AUTO: 98 FL (ref 82–98)
MONOCYTES # BLD AUTO: 0.5 K/UL (ref 0.3–1)
MONOCYTES NFR BLD: 5.6 % (ref 4–15)
NEUTROPHILS # BLD AUTO: 6.2 K/UL (ref 1.8–7.7)
NEUTROPHILS NFR BLD: 65.5 % (ref 38–73)
NONHDLC SERPL-MCNC: 101 MG/DL
NRBC BLD-RTO: 0 /100 WBC
PLATELET # BLD AUTO: 353 K/UL (ref 150–450)
PMV BLD AUTO: 10.5 FL (ref 9.2–12.9)
POTASSIUM SERPL-SCNC: 4.6 MMOL/L (ref 3.5–5.1)
PROT SERPL-MCNC: 7.8 G/DL (ref 6–8.4)
RBC # BLD AUTO: 4.53 M/UL (ref 4–5.4)
SODIUM SERPL-SCNC: 137 MMOL/L (ref 136–145)
TRIGL SERPL-MCNC: 82 MG/DL (ref 30–150)
TSH SERPL DL<=0.005 MIU/L-ACNC: 1.53 UIU/ML (ref 0.4–4)
WBC # BLD AUTO: 9.54 K/UL (ref 3.9–12.7)

## 2023-01-31 PROCEDURE — 80061 LIPID PANEL: CPT | Performed by: INTERNAL MEDICINE

## 2023-01-31 PROCEDURE — 93005 ELECTROCARDIOGRAM TRACING: CPT | Mod: S$GLB,,, | Performed by: INTERNAL MEDICINE

## 2023-01-31 PROCEDURE — 93005 EKG 12-LEAD: ICD-10-PCS | Mod: S$GLB,,, | Performed by: INTERNAL MEDICINE

## 2023-01-31 PROCEDURE — 90472 IMMUNIZATION ADMIN EACH ADD: CPT | Mod: S$GLB,,, | Performed by: INTERNAL MEDICINE

## 2023-01-31 PROCEDURE — 36415 COLL VENOUS BLD VENIPUNCTURE: CPT | Mod: PO | Performed by: INTERNAL MEDICINE

## 2023-01-31 PROCEDURE — 85025 COMPLETE CBC W/AUTO DIFF WBC: CPT | Performed by: INTERNAL MEDICINE

## 2023-01-31 PROCEDURE — 80053 COMPREHEN METABOLIC PANEL: CPT | Performed by: INTERNAL MEDICINE

## 2023-01-31 PROCEDURE — 1159F PR MEDICATION LIST DOCUMENTED IN MEDICAL RECORD: ICD-10-PCS | Mod: CPTII,S$GLB,, | Performed by: INTERNAL MEDICINE

## 2023-01-31 PROCEDURE — 93010 EKG 12-LEAD: ICD-10-PCS | Mod: S$GLB,,, | Performed by: INTERNAL MEDICINE

## 2023-01-31 PROCEDURE — 84443 ASSAY THYROID STIM HORMONE: CPT | Performed by: INTERNAL MEDICINE

## 2023-01-31 PROCEDURE — 83036 HEMOGLOBIN GLYCOSYLATED A1C: CPT | Performed by: INTERNAL MEDICINE

## 2023-01-31 PROCEDURE — 3008F BODY MASS INDEX DOCD: CPT | Mod: CPTII,S$GLB,, | Performed by: INTERNAL MEDICINE

## 2023-01-31 PROCEDURE — 3077F PR MOST RECENT SYSTOLIC BLOOD PRESSURE >= 140 MM HG: ICD-10-PCS | Mod: CPTII,S$GLB,, | Performed by: INTERNAL MEDICINE

## 2023-01-31 PROCEDURE — 90686 IIV4 VACC NO PRSV 0.5 ML IM: CPT | Mod: S$GLB,,, | Performed by: INTERNAL MEDICINE

## 2023-01-31 PROCEDURE — 1159F MED LIST DOCD IN RCRD: CPT | Mod: CPTII,S$GLB,, | Performed by: INTERNAL MEDICINE

## 2023-01-31 PROCEDURE — 99999 PR PBB SHADOW E&M-EST. PATIENT-LVL IV: CPT | Mod: PBBFAC,,, | Performed by: INTERNAL MEDICINE

## 2023-01-31 PROCEDURE — 90471 IMMUNIZATION ADMIN: CPT | Mod: S$GLB,,, | Performed by: INTERNAL MEDICINE

## 2023-01-31 PROCEDURE — 1160F RVW MEDS BY RX/DR IN RCRD: CPT | Mod: CPTII,S$GLB,, | Performed by: INTERNAL MEDICINE

## 2023-01-31 PROCEDURE — 90472 MMR VACCINE SQ: ICD-10-PCS | Mod: S$GLB,,, | Performed by: INTERNAL MEDICINE

## 2023-01-31 PROCEDURE — 99396 PREV VISIT EST AGE 40-64: CPT | Mod: 25,S$GLB,, | Performed by: INTERNAL MEDICINE

## 2023-01-31 PROCEDURE — 90707 MMR VACCINE SC: CPT | Mod: S$GLB,,, | Performed by: INTERNAL MEDICINE

## 2023-01-31 PROCEDURE — 90707 MMR VACCINE SQ: ICD-10-PCS | Mod: S$GLB,,, | Performed by: INTERNAL MEDICINE

## 2023-01-31 PROCEDURE — 3008F PR BODY MASS INDEX (BMI) DOCUMENTED: ICD-10-PCS | Mod: CPTII,S$GLB,, | Performed by: INTERNAL MEDICINE

## 2023-01-31 PROCEDURE — 3079F PR MOST RECENT DIASTOLIC BLOOD PRESSURE 80-89 MM HG: ICD-10-PCS | Mod: CPTII,S$GLB,, | Performed by: INTERNAL MEDICINE

## 2023-01-31 PROCEDURE — 93010 ELECTROCARDIOGRAM REPORT: CPT | Mod: S$GLB,,, | Performed by: INTERNAL MEDICINE

## 2023-01-31 PROCEDURE — 1160F PR REVIEW ALL MEDS BY PRESCRIBER/CLIN PHARMACIST DOCUMENTED: ICD-10-PCS | Mod: CPTII,S$GLB,, | Performed by: INTERNAL MEDICINE

## 2023-01-31 PROCEDURE — 3079F DIAST BP 80-89 MM HG: CPT | Mod: CPTII,S$GLB,, | Performed by: INTERNAL MEDICINE

## 2023-01-31 PROCEDURE — 3077F SYST BP >= 140 MM HG: CPT | Mod: CPTII,S$GLB,, | Performed by: INTERNAL MEDICINE

## 2023-01-31 PROCEDURE — 90686 FLU VACCINE (QUAD) GREATER THAN OR EQUAL TO 3YO PRESERVATIVE FREE IM: ICD-10-PCS | Mod: S$GLB,,, | Performed by: INTERNAL MEDICINE

## 2023-01-31 PROCEDURE — 90471 FLU VACCINE (QUAD) GREATER THAN OR EQUAL TO 3YO PRESERVATIVE FREE IM: ICD-10-PCS | Mod: S$GLB,,, | Performed by: INTERNAL MEDICINE

## 2023-01-31 PROCEDURE — 99999 PR PBB SHADOW E&M-EST. PATIENT-LVL IV: ICD-10-PCS | Mod: PBBFAC,,, | Performed by: INTERNAL MEDICINE

## 2023-01-31 PROCEDURE — 99396 PR PREVENTIVE VISIT,EST,40-64: ICD-10-PCS | Mod: 25,S$GLB,, | Performed by: INTERNAL MEDICINE

## 2023-01-31 RX ORDER — DIPHENOXYLATE HYDROCHLORIDE AND ATROPINE SULFATE 2.5; .025 MG/1; MG/1
1 TABLET ORAL 4 TIMES DAILY PRN
Qty: 60 TABLET | Refills: 0 | Status: SHIPPED | OUTPATIENT
Start: 2023-01-31 | End: 2024-02-20 | Stop reason: SDUPTHER

## 2023-01-31 RX ORDER — FAMOTIDINE 40 MG/1
40 TABLET, FILM COATED ORAL DAILY
Qty: 90 TABLET | Refills: 3 | Status: SHIPPED | OUTPATIENT
Start: 2023-01-31 | End: 2024-02-20 | Stop reason: SDUPTHER

## 2023-01-31 RX ORDER — CYCLOBENZAPRINE HCL 5 MG
5 TABLET ORAL 3 TIMES DAILY PRN
Qty: 90 TABLET | Refills: 1 | Status: SHIPPED | OUTPATIENT
Start: 2023-01-31 | End: 2023-02-10

## 2023-01-31 RX ORDER — CELECOXIB 200 MG/1
200 CAPSULE ORAL 2 TIMES DAILY PRN
Qty: 30 CAPSULE | Refills: 6 | Status: SHIPPED | OUTPATIENT
Start: 2023-01-31

## 2023-01-31 RX ORDER — PROMETHAZINE HYDROCHLORIDE AND CODEINE PHOSPHATE 6.25; 1 MG/5ML; MG/5ML
5 SOLUTION ORAL EVERY 6 HOURS PRN
Qty: 240 ML | Refills: 0 | Status: SHIPPED | OUTPATIENT
Start: 2023-01-31 | End: 2023-02-07

## 2023-01-31 RX ORDER — NITROFURANTOIN 25; 75 MG/1; MG/1
100 CAPSULE ORAL 2 TIMES DAILY
Qty: 60 CAPSULE | Refills: 2 | Status: SHIPPED | OUTPATIENT
Start: 2023-01-31 | End: 2023-02-05

## 2023-01-31 RX ORDER — ACYCLOVIR 400 MG/1
TABLET ORAL
Qty: 180 TABLET | Refills: 1 | Status: SHIPPED | OUTPATIENT
Start: 2023-01-31 | End: 2023-05-16

## 2023-01-31 RX ORDER — FLUTICASONE PROPIONATE 50 MCG
1 SPRAY, SUSPENSION (ML) NASAL DAILY
Qty: 48 ML | Refills: 3 | Status: SHIPPED | OUTPATIENT
Start: 2023-01-31 | End: 2024-02-20 | Stop reason: SDUPTHER

## 2023-01-31 RX ORDER — NORGESTIMATE AND ETHINYL ESTRADIOL 7DAYSX3 LO
1 KIT ORAL DAILY
Qty: 84 TABLET | Refills: 3 | Status: SHIPPED | OUTPATIENT
Start: 2023-01-31 | End: 2024-01-29

## 2023-01-31 RX ORDER — METOPROLOL SUCCINATE 25 MG/1
25 TABLET, EXTENDED RELEASE ORAL DAILY
Qty: 90 TABLET | Refills: 1 | Status: SHIPPED | OUTPATIENT
Start: 2023-01-31 | End: 2023-05-16

## 2023-01-31 NOTE — PROGRESS NOTES
Patient ID: Dyan Arredondo is a 52 y.o. female.    Chief Complaint: Annual Exam    HPI Dyan is a 52 y.o. female with high risk HPV and complex tear of meniscus who presents for annual exam.   She has pain in knee due to meniscus tear.  Requests medication to relieve this pain when it is severe. Follows with Orthopedics for this issue. And has been doing PT which has been helpful.   She requests promethazine with codeine to have on hand for when she has a cold.   Gets occasional back spasms and requests muscle relaxer for when this occurs.   Her BP is elevated in clinic today. Upon chart review, has had multiple doctor visits with elevated BP. Denies feeling anxious in clinic today.   Saw Allergy recently for facial rash. Using steroid cream which helps.   She requests MMR vaccine today.     Health Maintenance Topics with due status: Not Due       Topic Last Completion Date    TETANUS VACCINE 04/04/2018    Cervical Cancer Screening 12/18/2020    Hemoglobin A1c (Diabetic Prevention Screening) 12/20/2021    Lipid Panel 12/20/2021    Mammogram 12/12/2022        Review of Systems   Musculoskeletal:  Positive for arthralgias.   All other systems reviewed and are negative.      Objective:     Vitals:    01/31/23 0906   BP: (!) 142/86   Pulse:         Physical Exam  Vitals reviewed.   Constitutional:       General: She is not in acute distress.     Appearance: Normal appearance. She is well-developed. She is obese. She is not ill-appearing, toxic-appearing or diaphoretic.   HENT:      Head: Normocephalic and atraumatic.      Right Ear: External ear normal.      Left Ear: External ear normal.      Nose: Nose normal.   Eyes:      General: No scleral icterus.        Right eye: No discharge.         Left eye: No discharge.      Extraocular Movements: Extraocular movements intact.      Conjunctiva/sclera: Conjunctivae normal.   Cardiovascular:      Rate and Rhythm: Normal rate and regular rhythm.      Heart sounds: Normal  heart sounds. No murmur heard.    No friction rub. No gallop.   Pulmonary:      Effort: Pulmonary effort is normal. No respiratory distress.      Breath sounds: Normal breath sounds. No stridor. No wheezing, rhonchi or rales.   Skin:     General: Skin is warm and dry.   Neurological:      General: No focal deficit present.      Mental Status: She is alert and oriented to person, place, and time. Mental status is at baseline.   Psychiatric:         Mood and Affect: Mood normal.         Behavior: Behavior normal.         Thought Content: Thought content normal.         Judgment: Judgment normal.       Assessment:       1. Annual physical exam    2. Gastroesophageal reflux disease Well controlled   3. Encounter for contraceptive management, unspecified type Chronic   4. Abdominal pain, unspecified abdominal location Chronic   5. Cold sore Chronic   6. Recurrent UTI Chronic   7. Allergic rhinitis, unspecified seasonality, unspecified trigger Chronic   8. Encounter for screening colonoscopy    9. Need for influenza vaccination    10. Need for MMR vaccine    11. Essential hypertension Active   12. Upper respiratory tract infection, unspecified type Inactive   13. Back spasm Chronic   14. Complex tear of lateral meniscus of right knee as current injury, initial encounter Active         Plan:         Annual physical exam  -     CBC Auto Differential; Future; Expected date: 01/31/2023  -     Comprehensive Metabolic Panel; Future; Expected date: 01/31/2023  -     Hemoglobin A1C; Future; Expected date: 01/31/2023  -     Lipid Panel; Future; Expected date: 01/31/2023  -     TSH; Future; Expected date: 01/31/2023  -     EKG 12-lead; Future    Gastroesophageal reflux disease  Comments:  Cont current medication  Orders:  -     famotidine (PEPCID) 40 MG tablet; Take 1 tablet (40 mg total) by mouth once daily.  Dispense: 90 tablet; Refill: 3    Encounter for contraceptive management, unspecified type  -     norgestimate-ethinyl  estradioL (TRI-LO-ESTARYLLA) 0.18/0.215/0.25 mg-25 mcg tablet; Take 1 tablet by mouth once daily.  Dispense: 84 tablet; Refill: 3    Abdominal pain, unspecified abdominal location  Comments:  Continue current medication  Orders:  -     diphenoxylate-atropine 2.5-0.025 mg (LOMOTIL) 2.5-0.025 mg per tablet; Take 1 tablet by mouth 4 (four) times daily as needed for Diarrhea.  Dispense: 60 tablet; Refill: 0    Cold sore  Comments:  Continue current medication  Orders:  -     acyclovir (ZOVIRAX) 400 MG tablet; Take one tablet PO BID. Start at onset of symptoms. Take for 5 days total  Dispense: 180 tablet; Refill: 1    Recurrent UTI  Comments:  Cont current medication  Orders:  -     nitrofurantoin, macrocrystal-monohydrate, (MACROBID) 100 MG capsule; Take 1 capsule (100 mg total) by mouth 2 (two) times daily. for 5 days  Dispense: 60 capsule; Refill: 2    Allergic rhinitis, unspecified seasonality, unspecified trigger  Comments:  Cont current medication  Orders:  -     fluticasone propionate (FLONASE) 50 mcg/actuation nasal spray; 1 spray (50 mcg total) by Each Nostril route once daily.  Dispense: 48 mL; Refill: 3    Encounter for screening colonoscopy  -     Case Request Endoscopy: COLONOSCOPY    Need for influenza vaccination  -     Influenza - Quadrivalent *Preferred* (6 months+) (PF)    Need for MMR vaccine  -     (In Office Administered) MMR Vaccine (SQ)    Essential hypertension  Comments:  EKG normal. Start metoprolol   Orders:  -     metoprolol succinate (TOPROL-XL) 25 MG 24 hr tablet; Take 1 tablet (25 mg total) by mouth once daily.  Dispense: 90 tablet; Refill: 1    Upper respiratory tract infection, unspecified type  -     promethazine-codeine 6.25-10 mg/5 ml (PHENERGAN WITH CODEINE) 6.25-10 mg/5 mL syrup; Take 5 mLs by mouth every 6 (six) hours as needed for Cough.  Dispense: 240 mL; Refill: 0    Back spasm  -     cyclobenzaprine (FLEXERIL) 5 MG tablet; Take 1 tablet (5 mg total) by mouth 3 (three) times  daily as needed for Muscle spasms.  Dispense: 90 tablet; Refill: 1    Complex tear of lateral meniscus of right knee as current injury, initial encounter  Comments:  Managed by Ortho and PT. Discussed potential side effects associated with long term NSAID use such as GI bleed, renal dysfunction   Orders:  -     celecoxib (CELEBREX) 200 MG capsule; Take 1 capsule (200 mg total) by mouth 2 (two) times daily as needed for Pain.  Dispense: 30 capsule; Refill: 6        RTC 1-2 months, f/u HTN     Warning signs discussed, patient to call with any further issues or worsening of symptoms.       Parts of the above note were dictated using a voice dictation software. Please excuse any grammatical or typographical errors.

## 2023-02-02 ENCOUNTER — CLINICAL SUPPORT (OUTPATIENT)
Dept: REHABILITATION | Facility: HOSPITAL | Age: 53
End: 2023-02-02
Attending: ORTHOPAEDIC SURGERY
Payer: COMMERCIAL

## 2023-02-02 DIAGNOSIS — R29.898 DECREASED STRENGTH OF LOWER EXTREMITY: ICD-10-CM

## 2023-02-02 DIAGNOSIS — M25.661 DECREASED RANGE OF MOTION (ROM) OF RIGHT KNEE: Primary | ICD-10-CM

## 2023-02-02 PROCEDURE — 97110 THERAPEUTIC EXERCISES: CPT | Mod: PN,CQ

## 2023-02-02 NOTE — PROGRESS NOTES
"  Physical Therapy Daily Treatment Note     Name: Dyan Arredondo  [facility] Number: [id number]    Therapy Diagnosis:   Encounter Diagnoses   Name Primary?    Decreased range of motion (ROM) of right knee Yes    Decreased strength of lower extremity          Physician: Amilcar Ruiz MD    Visit Date: 2/2/2023    Physician Orders: PT Eval and Treat   Medical Diagnosis from Referral:   S83.271A (ICD-10-CM) - Complex tear of lat mensc, current injury, right knee, init   M17.11 (ICD-10-CM) - Primary osteoarthritis of right knee      Evaluation Date: 1/12/2023  Authorization Period Expiration: 12/31/2023  Plan of Care Expiration: 3/10/23  Visit # / Visits authorized: 5/20  FOTO: 5/10- Next  PTA visit: 1/5    Time In: 11:05 am  Time Out: 12:00 pm  Total Billable Time: 55 minutes TEx4    Precautions: Standard    Subjective     Pt reports: she is walking better able to put more weight on her right leg.  States she is also sit/stand from chair without using her arms.  She was compliant with home exercise program.  Response to previous treatment: decreased knee pain  Functional change: able to stand from chair without upper extremity assist    Pain: 1/10  Location: right knee      Objective   See objective measures at plan of care   Treatment     Dyan received therapeutic exercises to develop strength, endurance, ROM, and flexibility for  55 minutes including:  Recumbent bike:  Level 4 x 5 minutes, seat #14   Heel slides: 2x10 right with strap and board.  NP   Long arc quad 3x10 bilateral   Quad sets :  20x 5" Performed with Straight leg raise   Straight leg raise 1# 2x10 B  Bridge 2x10 5" glute focus  Sidelying hip abduction 3x10 R  Clamshell red theraband 3" hold 2x10 R  Standing hip abduction red theraband 3x10 bilateral   Standing hip extension red theraband   3x10 bilateral   Standing hip flexion: red theraband 2 x 10   Standing heel raises:  2x10 DL  Sit to stand from chair:  2x10 no upper extremity support  Leg " "Press:  Sled 4, 6 plates 3x10 double leg   Side steps: red band 4 laps (band above knees)  Long sit hamstring stretch 3 x 30" (table low)  Sitting calf stretch with strap 3 x 30"  Cable walk backs 10x 10lbs  Stair lunges 15x3"     Dyan received the following manual therapy techniques: Joint mobilizations, Myofacial release, and Soft tissue Mobilization were applied to the: right knee for 00 minutes, including:  Patellar mobs all directions  Tibiofemoral distraction supine and seated with MWM     Not today:  STM/MFR distal quad and medial / lateral patellar areas  Passive range of motion into physiological flexion and extension with gentle oscillations at end range.     Home Exercises Provided and Patient Education Provided     Education provided:   - seated exercises that can be performed at desk at work  - techniques to prop up LE + advised walking breaks during work as able   - Pain management with use of ice and elevation    Written Home Exercises Provided: Patient instructed to cont prior HEP.  Exercises were reviewed and Dyan was able to demonstrate them prior to the end of the session.  Dyan demonstrated good  understanding of the education provided.     See EMR under Patient Instructions for exercises provided  at [facility] on 1/12/2023 .      Assessment   Pt presents with low grade pain.  Subjective reports of increasing strength and endurance. Held manual therapy today. Able to perform progressions as noted in bold with minimal complaints of pain - mostly fatigued.  Required several short rest breaks between, but able to complete with reports only of fatigue after treatment. Monitor response to today's progressions.    Dyan Is progressing well towards her goals.   Pt prognosis is Good.     Pt will continue to benefit from skilled outpatient physical therapy to address the deficits listed in the problem list box on initial evaluation, provide pt/family education and to maximize pt's level of " independence in the home and community environment.     Pt's spiritual, cultural and educational needs considered and pt agreeable to plan of care and goals.    Anticipated barriers to physical therapy: chronicity    Goals:   Short Term Goals:     1. Pt will be independent with HEP supplement PT in improving functional mobility.  2. Pt will improve  right LE strength to at least 75% of left  LE strength as measured via MicroFet handheld dynamometer in order to improve functional mobility  3. Pt will improve right knee extension AROM to at least 0 in order to improve gait.     Long Term Goals:  1. Pt will be independent with updated HEP supplement PT in improving functional mobility.  2. Pt will improve right  LE strength to at least 90% of left LE strength as measured via MicroFet handheld dynamometer in order to improve functional mobility  3. Pt will improve FOTO knee survey score to </= 32% limited in order to demo improved functional mobility  4. Pt will improve score on LEFS by 9 pts in order to demo improved functional mobility  5.  Pt will perform at least 12 sit to stands without UE support on 30 second sit to stand test in order to demo improved ability to perform transfers       Plan     Continue PT per plan of care, progress as appropriate. Monitor response to no manual therapy and progressions of therex.    Saima Galvin, PTA

## 2023-02-04 NOTE — PROGRESS NOTES
"  Physical Therapy Daily Treatment Note     Name: Dyan Arredondo  Clinic Number: [id number]    Therapy Diagnosis:   Encounter Diagnoses   Name Primary?    Decreased range of motion (ROM) of right knee Yes    Decreased strength of lower extremity      Physician: Amilcar Ruiz MD    Visit Date: 2/6/2023    Physician Orders: PT Eval and Treat   Medical Diagnosis from Referral:   S83.271A (ICD-10-CM) - Complex tear of lat mensc, current injury, right knee, init   M17.11 (ICD-10-CM) - Primary osteoarthritis of right knee   Evaluation Date: 1/12/2023  Authorization Period Expiration: 12/31/2023  Plan of Care Expiration: 3/10/23  Visit # / Visits authorized: 7/20  FOTO #2: 2/6/2023  PTA visit: 0/5    Time In: 1303  Time Out: 1400  Total Billable Time: 55 minutes    Precautions: Standard    Subjective     Pt reports: has been noticing increased mechanical symptoms.  She was compliant with home exercise program.  Response to previous treatment: decreased knee pain  Functional change: able to stand from chair without upper extremity assist    Pain: 1/10  Location: right knee      Objective     2/6/2023  Right knee extension = -2 degrees (reproduction of symptoms with overpressure)  - Grey  - Hyperflexion  - Patellar grind    Treatment     Dyan received therapeutic exercises to develop strength, endurance, ROM, and flexibility for 35 minutes including:  Straight leg raise - 3# 2x10  Bridge 2x10 5" glute focus  Bridge Walk outs - x10 (for hamstrings - monitor lumbar extension)  TKE with Hip abdcuction - green power band for right knee; 3x10  Resisted lateral walking - green theraband at ankles; 10 steps laps x3 (start wide and stay wide)  Seated knee flexion - blue therabands around ankle and proximal knee; 2x10 with 5 second holds      Not Today:  Recumbent bike:  Level 4 x 5 minutes, seat #14  Sidelying hip abduction 3x10 R  Standing hip abduction red theraband 3x10 bilateral   Standing hip extension red " "theraband   3x10 bilateral   Standing hip flexion: red theraband 2 x 10   Standing heel raises:  2x10 double leg  Sit to stand from chair:  2x10 no upper extremity support  Leg Press:  Sled 4, 6 plates 3x10 double leg  Long sit hamstring stretch 3 x 30" (table low)      Dyan received the following manual therapy techniques: Joint mobilizations, Myofacial release, and Soft tissue Mobilization were applied to the: right knee for 8 minutes, including:  Right knee mensicus whip mobilization - grade IV - V  Right lower extremity long axis distraction - grade V      Therapeutic Activities to improve functional performance for 12 minutes, including:  Assessment as above  Education (See below)  Questions and answers     Home Exercises Provided and Patient Education Provided     Education provided:   Anatomy and Pathology.  Symptom management and plan of care progressions.  Home Exercise Program.    Written Home Exercises Provided: Patient instructed to cont prior HEP.  Exercises were reviewed and Dyan was able to demonstrate them prior to the end of the session.  Dyan demonstrated good  understanding of the education provided.     See EMR under Patient Instructions for exercises provided  at [facility] on 1/12/2023 .      Assessment   Restored terminal knee extension and resolved pain in weightbearing with today's manual techniques. Reinforced positive changes with today's exercises addressing knee extension, quad strength, glute control, and hamstring strength. Fatigue upon end of session with no exacerbation in symptoms. Monitor treatment response and proceed as indicated at next visit.    Dyan Is progressing well towards her goals.   Pt prognosis is Good.     Pt will continue to benefit from skilled outpatient physical therapy to address the deficits listed in the problem list box on initial evaluation, provide pt/family education and to maximize pt's level of independence in the home and community environment. " Pt's spiritual, cultural and educational needs considered and pt agreeable to plan of care and goals.    Anticipated barriers to physical therapy: chronicity    Goals:  Short Term Goals:  1. Pt will be independent with HEP supplement PT in improving functional mobility.  2. Pt will improve  right LE strength to at least 75% of left  LE strength as measured via MicroFet handheld dynamometer in order to improve functional mobility  3. Pt will improve right knee extension AROM to at least 0 in order to improve gait.     Long Term Goals:  1. Pt will be independent with updated HEP supplement PT in improving functional mobility.  2. Pt will improve right  LE strength to at least 90% of left LE strength as measured via MicroFet handheld dynamometer in order to improve functional mobility  3. Pt will improve FOTO knee survey score to </= 32% limited in order to demo improved functional mobility  4. Pt will improve score on LEFS by 9 pts in order to demo improved functional mobility  5.  Pt will perform at least 12 sit to stands without UE support on 30 second sit to stand test in order to demo improved ability to perform transfers     Plan     Restore terminal knee extension.  Right hamstring strengthening.  Lower extremity strength and proprioceptive control.    Julián Stanford, PT, DPT, OCS

## 2023-02-06 ENCOUNTER — CLINICAL SUPPORT (OUTPATIENT)
Dept: REHABILITATION | Facility: HOSPITAL | Age: 53
End: 2023-02-06
Payer: COMMERCIAL

## 2023-02-06 DIAGNOSIS — M25.661 DECREASED RANGE OF MOTION (ROM) OF RIGHT KNEE: Primary | ICD-10-CM

## 2023-02-06 DIAGNOSIS — R29.898 DECREASED STRENGTH OF LOWER EXTREMITY: ICD-10-CM

## 2023-02-06 PROCEDURE — 97110 THERAPEUTIC EXERCISES: CPT | Mod: PN

## 2023-02-06 PROCEDURE — 97530 THERAPEUTIC ACTIVITIES: CPT | Mod: PN

## 2023-02-06 PROCEDURE — 97140 MANUAL THERAPY 1/> REGIONS: CPT | Mod: PN

## 2023-02-07 DIAGNOSIS — J06.9 UPPER RESPIRATORY TRACT INFECTION, UNSPECIFIED TYPE: Primary | ICD-10-CM

## 2023-02-07 RX ORDER — PROMETHAZINE HYDROCHLORIDE AND DEXTROMETHORPHAN HYDROBROMIDE 6.25; 15 MG/5ML; MG/5ML
5 SYRUP ORAL EVERY 6 HOURS PRN
Qty: 118 ML | Refills: 1 | Status: SHIPPED | OUTPATIENT
Start: 2023-02-07 | End: 2023-02-17

## 2023-02-09 ENCOUNTER — CLINICAL SUPPORT (OUTPATIENT)
Dept: REHABILITATION | Facility: HOSPITAL | Age: 53
End: 2023-02-09
Payer: COMMERCIAL

## 2023-02-09 DIAGNOSIS — M25.661 DECREASED RANGE OF MOTION (ROM) OF RIGHT KNEE: Primary | ICD-10-CM

## 2023-02-09 DIAGNOSIS — R29.898 DECREASED STRENGTH OF LOWER EXTREMITY: ICD-10-CM

## 2023-02-09 PROCEDURE — 97140 MANUAL THERAPY 1/> REGIONS: CPT | Mod: PN

## 2023-02-09 PROCEDURE — 97110 THERAPEUTIC EXERCISES: CPT | Mod: PN

## 2023-02-09 NOTE — PROGRESS NOTES
"  Physical Therapy Daily Treatment Note     Name: Dyan Arredondo  Clinic Number: [id number]    Therapy Diagnosis:   Encounter Diagnoses   Name Primary?    Decreased range of motion (ROM) of right knee Yes    Decreased strength of lower extremity        Physician: Amilcar Ruiz MD    Visit Date: 2/9/2023    Physician Orders: PT Eval and Treat   Medical Diagnosis from Referral:   S83.271A (ICD-10-CM) - Complex tear of lat mensc, current injury, right knee, init   M17.11 (ICD-10-CM) - Primary osteoarthritis of right knee   Evaluation Date: 1/12/2023  Authorization Period Expiration: 12/31/2023  Plan of Care Expiration: 3/10/23  Visit # / Visits authorized: 8/20  FOTO #2: 2/6/2023  PTA visit: 0/5    Time In: 11:00  Time Out: 12:00  Total Billable Time: 60 minutes    Precautions: Standard    Subjective     Pt reports: she has some pain when she moves it. She felt great for 48 hrs after last session was walking with better extension but then pain returned.    She was compliant with home exercise program.  Response to previous treatment: decreased knee pain  Functional change: able to stand from chair without upper extremity assist    Pain: 1/10  Location: right knee      Objective   Suprapatellar swelling     2/6/2023  Right knee extension = -2 degrees (reproduction of symptoms with overpressure)  - Grey  - Hyperflexion  - Patellar grind    Treatment     Dyan received therapeutic exercises to develop strength, endurance, ROM, and flexibility for 45 minutes including:  Straight leg raise -  (no weight today- resume next 3#) 3x10  Bridge 2x10 5" glute focus  Bridge Walk outs - x10 (for hamstrings - monitor lumbar extension)  TKE with Hip abdcuction - green power band for right knee; 3x10  Resisted lateral walking - green theraband at ankles; 10 steps laps x3 (start wide and stay wide)  Seated knee flexion - blue therabands around ankle and proximal knee; 2x10 with 5 second holds  Sidelying hip abduction 3x10 " "right  Long sit hamstring stretch 3 x 30" (table low)    Not Today:  Recumbent bike:  Level 4 x 5 minutes, seat #14  Standing hip abduction red theraband 3x10 bilateral   Standing hip extension red theraband   3x10 bilateral   Standing hip flexion: red theraband 2 x 10   Standing heel raises:  2x10 double leg  Sit to stand from chair:  2x10 no upper extremity support  Leg Press:  Sled 4, 6 plates 3x10 double leg      Dyan received the following manual therapy techniques: Joint mobilizations, Myofacial release, and Soft tissue Mobilization were applied to the: right knee for 15 minutes, including:  Right knee mensicus whip mobilization - grade IV   Right lower extremity long axis distraction - grade V  Patellar mobilizations       Therapeutic Activities to improve functional performance for 0 minutes, including:  Education (See below)  Questions and answers     Home Exercises Provided and Patient Education Provided     Education provided:   Anatomy and Pathology.  Symptom management and plan of care progressions.  Home Exercise Program.    Written Home Exercises Provided: Patient instructed to cont prior HEP.  Exercises were reviewed and Dyan was able to demonstrate them prior to the end of the session.  Dyan demonstrated good  understanding of the education provided.     See EMR under Patient Instructions for exercises provided  at [facility] on 1/12/2023 .      Assessment   Pt with initial excellent response to prior session then regression with increased swelling. Continued with joint mobilizations to knee with good response and able to regain terminal knee extension. Impaired quad activation and lag with Straight leg raise noted today, therefore regressed weight. Good tolerance for remaining there-ex. Minimal progressions made as pt will be doing a lot of standing, walking, lifting, high stepping at [employer] over the weekend. Instructed to perform exercises prior to going to view [employer] and using " compression/elevation as needed. Provided tubigrip D but pt states she has a knee compression that will provide more compression. Monitor response and progress as tolerated.     Dyan Is progressing well towards her goals.   Pt prognosis is Good.     Pt will continue to benefit from skilled outpatient physical therapy to address the deficits listed in the problem list box on initial evaluation, provide pt/family education and to maximize pt's level of independence in the home and community environment. Pt's spiritual, cultural and educational needs considered and pt agreeable to plan of care and goals.    Anticipated barriers to physical therapy: chronicity    Goals:  Short Term Goals:  1. Pt will be independent with HEP supplement PT in improving functional mobility.  2. Pt will improve  right LE strength to at least 75% of left  LE strength as measured via MicroFet handheld dynamometer in order to improve functional mobility  3. Pt will improve right knee extension AROM to at least 0 in order to improve gait.     Long Term Goals:  1. Pt will be independent with updated HEP supplement PT in improving functional mobility.  2. Pt will improve right  LE strength to at least 90% of left LE strength as measured via MicroFet handheld dynamometer in order to improve functional mobility  3. Pt will improve FOTO knee survey score to </= 32% limited in order to demo improved functional mobility  4. Pt will improve score on LEFS by 9 pts in order to demo improved functional mobility  5.  Pt will perform at least 12 sit to stands without UE support on 30 second sit to stand test in order to demo improved ability to perform transfers     Plan     Restore terminal knee extension.  Right hamstring strengthening.  Lower extremity strength and proprioceptive control.    GUILLERMO BHAT, PT, DPT

## 2023-02-13 ENCOUNTER — CLINICAL SUPPORT (OUTPATIENT)
Dept: REHABILITATION | Facility: HOSPITAL | Age: 53
End: 2023-02-13
Attending: ORTHOPAEDIC SURGERY
Payer: COMMERCIAL

## 2023-02-13 DIAGNOSIS — M25.661 DECREASED RANGE OF MOTION (ROM) OF RIGHT KNEE: Primary | ICD-10-CM

## 2023-02-13 DIAGNOSIS — R29.898 DECREASED STRENGTH OF LOWER EXTREMITY: ICD-10-CM

## 2023-02-13 PROCEDURE — 97140 MANUAL THERAPY 1/> REGIONS: CPT | Mod: PN

## 2023-02-13 PROCEDURE — 97110 THERAPEUTIC EXERCISES: CPT | Mod: PN

## 2023-02-13 NOTE — PROGRESS NOTES
"  Physical Therapy Daily Treatment Note     Name: Dyan Arredondo  [facility] Number: [id number]    Therapy Diagnosis:   Encounter Diagnoses   Name Primary?    Decreased range of motion (ROM) of right knee Yes    Decreased strength of lower extremity        Physician: Amilcar Ruiz MD    Visit Date: 2/13/2023    Physician Orders: PT Eval and Treat   Medical Diagnosis from Referral:   S83.271A (ICD-10-CM) - Complex tear of lat mensc, current injury, right knee, init   M17.11 (ICD-10-CM) - Primary osteoarthritis of right knee   Evaluation Date: 1/12/2023  Authorization Period Expiration: 12/31/2023  Plan of Care Expiration: 3/10/23  Visit # / Visits authorized: 9/20  FOTO #3: 1/5  PTA visit: 0/5    Time In: 11:00  Time Out: 11:54  Total Billable Time: 54 minutes    Precautions: Standard    Subjective     Pt reports: she did a lot of standing and walking over the weekend. She took pain medication 3x, 2 preventatively and one towards the end of the day due to soreness. She feels it is just a little bit off from straight today.   She was compliant with home exercise program.  Response to previous treatment: decreased knee pain  Functional change: able to stand from chair without upper extremity assist    Pain: 1/10  Location: right knee      Objective   Suprapatellar swelling   2 deg ext pre treatment, 0 post treatment    Treatment     Dyan received therapeutic exercises to develop strength, endurance, ROM, and flexibility for 44 minutes including:  Straight leg raise -  resumed 2# 3x10  Bridge 2x10 5" glute focus green theraband   Bridge Walk outs - x10 (for hamstrings - monitor lumbar extension)  TKE with Hip abdcuction - green power band for right knee; 3x10 left ; regular standing hip abduction green theraband right   Resisted lateral walking - green theraband at ankles; 10 steps laps x3 (start wide and stay wide)  Seated knee flexion - blue therabands around ankle and proximal knee; 2x10 with 5 second " "holds  Sidelying hip abduction 3x10 right NP  Long sit hamstring stretch 3 x 30" (table low)  Shuttle leg press 2 top cords 2x10  Sidelying shuttle leg press 1/2 cord 1x10 bilateral     Not Today:  Recumbent bike:  Level 4 x 5 minutes, seat #14  Standing hip abduction red theraband 3x10 bilateral   Standing hip extension red theraband   3x10 bilateral   Standing hip flexion: red theraband 2 x 10   Standing heel raises:  2x10 double leg  Sit to stand from chair:  2x10 no upper extremity support  Leg Press:  Sled 4, 6 plates 3x10 double leg      Dyan received the following manual therapy techniques: Joint mobilizations, Myofacial release, and Soft tissue Mobilization were applied to the: right knee for 10 minutes, including:  Right knee mensicus whip mobilization - grade IV   Right lower extremity long axis distraction - grade V  Patellar mobilizations       Therapeutic Activities to improve functional performance for 0 minutes, including:  Education (See below)  Questions and answers     Home Exercises Provided and Patient Education Provided     Education provided:   Anatomy and Pathology.  Symptom management and plan of care progressions.  Home Exercise Program.    Written Home Exercises Provided: Patient instructed to cont prior HEP.  Exercises were reviewed and Dyan was able to demonstrate them prior to the end of the session.  Dyan demonstrated good  understanding of the education provided.     See EMR under Patient Instructions for exercises provided  at [facility] on 1/12/2023 .      Assessment   Pt presents back following prolonged standing and walking at [employer] over the weekend. Ongoing suprapatellar effusion noted but not significantly changed from prior visit. Initially slightly lacking TKE which returned to full extension following manual techniques. Continued to progress with hip and knee strengthening and able to resume weight with Straight leg raise. Pt with muscular fatigue post treatment but " reports feeling better than when she came in. Continue to progress as tolerated.     Dyan Is progressing well towards her goals.   Pt prognosis is Good.     Pt will continue to benefit from skilled outpatient physical therapy to address the deficits listed in the problem list box on initial evaluation, provide pt/family education and to maximize pt's level of independence in the home and community environment. Pt's spiritual, cultural and educational needs considered and pt agreeable to plan of care and goals.    Anticipated barriers to physical therapy: chronicity    Goals:  Short Term Goals:  1. Pt will be independent with HEP supplement PT in improving functional mobility.  2. Pt will improve  right LE strength to at least 75% of left  LE strength as measured via MicroFet handheld dynamometer in order to improve functional mobility  3. Pt will improve right knee extension AROM to at least 0 in order to improve gait.     Long Term Goals:  1. Pt will be independent with updated HEP supplement PT in improving functional mobility.  2. Pt will improve right  LE strength to at least 90% of left LE strength as measured via MicroFet handheld dynamometer in order to improve functional mobility  3. Pt will improve FOTO knee survey score to </= 32% limited in order to demo improved functional mobility  4. Pt will improve score on LEFS by 9 pts in order to demo improved functional mobility  5.  Pt will perform at least 12 sit to stands without UE support on 30 second sit to stand test in order to demo improved ability to perform transfers     Plan     Restore terminal knee extension.  Right hamstring strengthening.  Lower extremity strength and proprioceptive control.    GUILLERMO BHAT, PT, DPT

## 2023-02-17 ENCOUNTER — CLINICAL SUPPORT (OUTPATIENT)
Dept: REHABILITATION | Facility: HOSPITAL | Age: 53
End: 2023-02-17
Payer: COMMERCIAL

## 2023-02-17 DIAGNOSIS — R29.898 DECREASED STRENGTH OF LOWER EXTREMITY: ICD-10-CM

## 2023-02-17 DIAGNOSIS — M25.661 DECREASED RANGE OF MOTION (ROM) OF RIGHT KNEE: Primary | ICD-10-CM

## 2023-02-17 PROCEDURE — 97110 THERAPEUTIC EXERCISES: CPT | Mod: PN

## 2023-02-17 PROCEDURE — 97140 MANUAL THERAPY 1/> REGIONS: CPT | Mod: PN

## 2023-02-17 NOTE — PROGRESS NOTES
"  Physical Therapy Daily Treatment Note     Name: Dyan Arredondo  [facility] Number: [id number]    Therapy Diagnosis:   Encounter Diagnoses   Name Primary?    Decreased range of motion (ROM) of right knee Yes    Decreased strength of lower extremity        Physician: Amilcar Ruiz MD    Visit Date: 2/17/2023    Physician Orders: PT Eval and Treat   Medical Diagnosis from Referral:   S83.271A (ICD-10-CM) - Complex tear of lat mensc, current injury, right knee, init   M17.11 (ICD-10-CM) - Primary osteoarthritis of right knee   Evaluation Date: 1/12/2023  Authorization Period Expiration: 12/31/2023  Plan of Care Expiration: 3/10/23  Visit # / Visits authorized: 10/20  FOTO #3: 2/5  PTA visit: 0/5    Time In: 1:00  Time Out: 1:53  Total Billable Time: 53 minutes    Precautions: Standard    Subjective     Pt reports: she did a lot of walking and standing yesterday having increased pain today. Woke up with left hurting too but feeling better now. Soaked with epsom salt.   She was compliant with home exercise program.  Response to previous treatment: decreased knee pain  Functional change: able to stand from chair without upper extremity assist    Pain: 3/10  Location: right knee      Objective   Suprapatellar swelling    0 deg extension post treatment    Treatment     Dyan received therapeutic exercises to develop strength, endurance, ROM, and flexibility for 43 minutes including:  Straight leg raise -  (held 2# ) 3x10  Bridge 2x10 5" glute focus green theraband   Bridge Walk outs - x11 (for hamstrings - monitor lumbar extension)  TKE with Hip abdcuction - green power band for right knee; 3x10 bilateral   Resisted lateral walking - green theraband at ankles; 10 steps laps x3 (start wide and stay wide)  Seated knee flexion - blue therabands around ankle and proximal knee; 2x10 with 5 second holds NP  Sidelying hip abduction 3x10 right NP  Long arc quad 20x   Long sit hamstring stretch 3 x 30" (table low)  Shuttle leg " press 2 top cords 2x10  Sidelying shuttle leg press 1/2 cord 1x10 bilateral     Not Today:  Recumbent bike:  Level 4 x 5 minutes, seat #14  Standing hip abduction red theraband 3x10 bilateral   Standing hip extension red theraband   3x10 bilateral   Standing hip flexion: red theraband 2 x 10   Standing heel raises:  2x10 double leg  Sit to stand from chair:  2x10 no upper extremity support  Leg Press:  Sled 4, 6 plates 3x10 double leg      Dyan received the following manual therapy techniques: Joint mobilizations, Myofacial release, and Soft tissue Mobilization were applied to the: right knee for 10 minutes, including:  Right knee mensicus whip mobilization   Right lower extremity long axis distraction   Right tibial distraction   Patellar mobilizations       Therapeutic Activities to improve functional performance for 0 minutes, including:  Education (See below)  Questions and answers     Home Exercises Provided and Patient Education Provided     Education provided:   Anatomy and Pathology.  Symptom management and plan of care progressions.  Home Exercise Program.    Written Home Exercises Provided: Patient instructed to cont prior HEP.  Exercises were reviewed and Dyan was able to demonstrate them prior to the end of the session.  Dyan demonstrated good  understanding of the education provided.     See EMR under Patient Instructions for exercises provided  at [facility] on 1/12/2023 .      Assessment   Pt with continued increased standing and walking, expected to continue over the next 5 days. Pt with ongoing suprapatellar effusion and high subjective reports of pain leading to inability to tolerate weight with Straight leg raise. Continued with exercise as tolerated and performed manual therapy mid session with excellent relief. Improved tolerance for there-ex following manual therapy. Minimal progressions made as she will continue to be performing increased activity over the next few days. Educated on  compression, elevation, and exercises as tolerated over the weekend.     Dyan Is progressing well towards her goals.   Pt prognosis is Good.     Pt will continue to benefit from skilled outpatient physical therapy to address the deficits listed in the problem list box on initial evaluation, provide pt/family education and to maximize pt's level of independence in the home and community environment. Pt's spiritual, cultural and educational needs considered and pt agreeable to plan of care and goals.    Anticipated barriers to physical therapy: chronicity    Goals:  Short Term Goals:  1. Pt will be independent with HEP supplement PT in improving functional mobility.  2. Pt will improve  right LE strength to at least 75% of left  LE strength as measured via MicroFet handheld dynamometer in order to improve functional mobility  3. Pt will improve right knee extension AROM to at least 0 in order to improve gait.     Long Term Goals:  1. Pt will be independent with updated HEP supplement PT in improving functional mobility.  2. Pt will improve right  LE strength to at least 90% of left LE strength as measured via MicroFet handheld dynamometer in order to improve functional mobility  3. Pt will improve FOTO knee survey score to </= 32% limited in order to demo improved functional mobility  4. Pt will improve score on LEFS by 9 pts in order to demo improved functional mobility  5.  Pt will perform at least 12 sit to stands without UE support on 30 second sit to stand test in order to demo improved ability to perform transfers     Plan     Restore terminal knee extension.  Right hamstring strengthening.  Lower extremity strength and proprioceptive control.    GUILLERMO BHAT, PT, DPT

## 2023-02-20 ENCOUNTER — CLINICAL SUPPORT (OUTPATIENT)
Dept: REHABILITATION | Facility: HOSPITAL | Age: 53
End: 2023-02-20
Attending: ORTHOPAEDIC SURGERY
Payer: COMMERCIAL

## 2023-02-20 DIAGNOSIS — R29.898 DECREASED STRENGTH OF LOWER EXTREMITY: ICD-10-CM

## 2023-02-20 DIAGNOSIS — M25.661 DECREASED RANGE OF MOTION (ROM) OF RIGHT KNEE: Primary | ICD-10-CM

## 2023-02-20 PROCEDURE — 97110 THERAPEUTIC EXERCISES: CPT | Mod: PN

## 2023-02-20 NOTE — PROGRESS NOTES
"  Physical Therapy Daily Treatment Note     Name: Dyan Arredondo  Clinic Number: [id number]    Therapy Diagnosis:   Encounter Diagnoses   Name Primary?    Decreased range of motion (ROM) of right knee Yes    Decreased strength of lower extremity        Physician: Amilcar Ruiz MD    Visit Date: 2/20/2023    Physician Orders: PT Eval and Treat   Medical Diagnosis from Referral:   S83.271A (ICD-10-CM) - Complex tear of lat mensc, current injury, right knee, init   M17.11 (ICD-10-CM) - Primary osteoarthritis of right knee   Evaluation Date: 1/12/2023  Authorization Period Expiration: 12/31/2023  Plan of Care Expiration: 3/10/23  Visit # / Visits authorized: 11/20  FOTO #3: 3/5  PTA visit: 0/5    Time In: 11:04 am  Time Out: 11:50 am  Total Billable Time: 46 minutes    Precautions: Standard    Subjective     Pt reports: States she was hurting a lot last week from all the [place] work. States she felt better after leaving on Friday but she was up all night because of the pain. States she rested most of Saturday and Sunday and her knee gradually felt better and better.   She was compliant with home exercise program.  Response to previous treatment: decreased knee pain  Functional change: able to stand from chair without upper extremity assist    Pain: 3/10  Location: right knee      Objective   Suprapatellar swelling    0 deg extension post treatment    Treatment     Dyan received therapeutic exercises to develop strength, endurance, ROM, and flexibility for 43 minutes including:  Straight leg raise -  1# on R, 1.5# on L 3x10  Bridge 2x10 5" glute focus green theraband   Bridge Walk outs - x11 (for hamstrings - monitor lumbar extension) - NT  TKE with Hip abduction - blue power band for right knee; 3x10 R side  Resisted lateral walking - green theraband at ankles; 10 steps laps x3 (start wide and stay wide)  Seated knee flexion - blue therabands around ankle and proximal knee; 2x10 with 5 second holds " "NP  Sidelying hip abduction 3x10 right NP  Long arc quad 20x   Long sit hamstring stretch 3 x 30" (table low)  Shuttle leg press 2 top cords 2x10  Sidelying shuttle leg press 1/2 cord 1x10 bilateral   Standing heel raises:  2x10 double leg  Standing heel raises x 10 single leg - noted increased pain after this exercise    Not Today:  Recumbent bike:  Level 4 x 5 minutes, seat #14  Standing hip abduction red theraband 3x10 bilateral   Standing hip extension red theraband   3x10 bilateral   Standing hip flexion: red theraband 2 x 10   Sit to stand from chair:  2x10 no upper extremity support  Leg Press:  Sled 4, 6 plates 3x10 double leg      Dyan received the following manual therapy techniques: Joint mobilizations, Myofacial release, and Soft tissue Mobilization were applied to the: right knee for 0 minutes, including:  Right knee mensicus whip mobilization   Right lower extremity long axis distraction   Right tibial distraction   Patellar mobilizations       Therapeutic Activities to improve functional performance for 0 minutes, including:  Education (See below)  Questions and answers     Home Exercises Provided and Patient Education Provided     Education provided:   Anatomy and Pathology.  Symptom management and plan of care progressions.  Home Exercise Program.    Written Home Exercises Provided: Patient instructed to cont prior HEP.  Exercises were reviewed and Dyan was able to demonstrate them prior to the end of the session.  Dyan demonstrated good  understanding of the education provided.     See EMR under Patient Instructions for exercises provided  at [facility] on 1/12/2023 .      Assessment   Pt with improved symptoms today with reduced knee pain compared to previous session. Progressed exercises per bold in objective and added single leg heel raises which client reported difficulty and pain following the exercise.     Dyan Is progressing well towards her goals.   Pt prognosis is Good.     Pt will " continue to benefit from skilled outpatient physical therapy to address the deficits listed in the problem list box on initial evaluation, provide pt/family education and to maximize pt's level of independence in the home and community environment. Pt's spiritual, cultural and educational needs considered and pt agreeable to plan of care and goals.    Anticipated barriers to physical therapy: chronicity    Goals:  Short Term Goals:  1. Pt will be independent with HEP supplement PT in improving functional mobility.  2. Pt will improve  right LE strength to at least 75% of left  LE strength as measured via MicroFet handheld dynamometer in order to improve functional mobility  3. Pt will improve right knee extension AROM to at least 0 in order to improve gait.     Long Term Goals:  1. Pt will be independent with updated HEP supplement PT in improving functional mobility.  2. Pt will improve right  LE strength to at least 90% of left LE strength as measured via MicroFet handheld dynamometer in order to improve functional mobility  3. Pt will improve FOTO knee survey score to </= 32% limited in order to demo improved functional mobility  4. Pt will improve score on LEFS by 9 pts in order to demo improved functional mobility  5.  Pt will perform at least 12 sit to stands without UE support on 30 second sit to stand test in order to demo improved ability to perform transfers     Plan     Restore terminal knee extension.  Right hamstring strengthening.  Lower extremity strength and proprioceptive control.    Mark Maddox, PT, DPT

## 2023-02-23 ENCOUNTER — CLINICAL SUPPORT (OUTPATIENT)
Dept: REHABILITATION | Facility: HOSPITAL | Age: 53
End: 2023-02-23
Attending: ORTHOPAEDIC SURGERY
Payer: COMMERCIAL

## 2023-02-23 DIAGNOSIS — M25.661 DECREASED RANGE OF MOTION (ROM) OF RIGHT KNEE: Primary | ICD-10-CM

## 2023-02-23 DIAGNOSIS — R29.898 DECREASED STRENGTH OF LOWER EXTREMITY: ICD-10-CM

## 2023-02-23 PROCEDURE — 97110 THERAPEUTIC EXERCISES: CPT | Mod: PN,CQ

## 2023-02-23 NOTE — PROGRESS NOTES
"  Physical Therapy Daily Treatment Note     Name: Dyan Arredondo  Clinic Number: [id number]    Therapy Diagnosis:   Encounter Diagnoses   Name Primary?    Decreased range of motion (ROM) of right knee Yes    Decreased strength of lower extremity          Physician: Amilcar Ruzi MD    Visit Date: 2/23/2023    Physician Orders: PT Eval and Treat   Medical Diagnosis from Referral:   S83.271A (ICD-10-CM) - Complex tear of lat mensc, current injury, right knee, init   M17.11 (ICD-10-CM) - Primary osteoarthritis of right knee   Evaluation Date: 1/12/2023  Authorization Period Expiration: 12/31/2023  Plan of Care Expiration: 3/10/23  Visit # / Visits authorized: 12/20  FOTO #3: 4/5  PTA visit: 1/5    Time In: 11:00 am  Time Out: 11:50 am  Total Billable Time: 46 minutes    Precautions: Standard    Subjective     Pt reports: she was able to go out [employer] and be on her feet walking/standing most of day.  "And then I was dancing on the porch after!  I was a little sore - but ok the next day"  Patient very pleased to be able to increase activity.  She was compliant with home exercise program.  Response to previous treatment: decreased knee pain  Functional change: able to stand from chair without upper extremity assist    Pain: 1.5/10  Location: right knee      Objective   Suprapatellar swelling    0 deg extension post treatment    Treatment     Dyan received therapeutic exercises to develop strength, endurance, ROM, and flexibility for 47 minutes including:  Straight leg raise -  2# 3x8+6 on R, 2# on L 3x10  Bridge 2x10 5" glute focus green theraband   Bridge Walk outs - x11 (for hamstrings - monitor lumbar extension) - NT  TKE with Hip abduction - teal power band for right knee; 3x10 + 2x10 with left abduction  Resisted lateral walking - green theraband at ankles; 16 steps laps x 2 (start wide and stay wide)  Seated knee flexion - blue therabands around ankle and proximal knee; 2x10 with 5 second holds " "NP  Sidelying hip abduction 3x10 right NP  Long arc quad 20x 3" hold  Long sit hamstring stretch 3 x 30" long sit  Shuttle leg press 2 top cords 3x10  Sidelying shuttle leg press 1/2 cord 2x10 left - right total 20 with multiple breaks  Standing heel raises:  2x10 double leg  Standing heel raises 2x5 single leg - slight increased pain    Not Today:  Recumbent bike:  Level 4 x 5 minutes, seat #14  Standing hip abduction red theraband 3x10 bilateral   Standing hip extension red theraband   3x10 bilateral   Standing hip flexion: red theraband 2 x 10   Sit to stand from chair:  2x10 no upper extremity support  Leg Press:  Sled 4, 6 plates 3x10 double leg      Dyan received the following manual therapy techniques: Joint mobilizations, Myofacial release, and Soft tissue Mobilization were applied to the: right knee for 0 minutes, including:  Right knee mensicus whip mobilization   Right lower extremity long axis distraction   Right tibial distraction   Patellar mobilizations       Therapeutic Activities to improve functional performance for 0 minutes, including:  Education (See below)  Questions and answers     Home Exercises Provided and Patient Education Provided     Education provided:   Anatomy and Pathology.  Symptom management and plan of care progressions.  Home Exercise Program.    Written Home Exercises Provided: Patient instructed to cont prior HEP.  Exercises were reviewed and Dyan was able to demonstrate them prior to the end of the session.  Dyan demonstrated good  understanding of the education provided.     See EMR under Patient Instructions for exercises provided  at [facility] on 1/12/2023 .      Assessment   Pt again with reduced knee pain compared to previous session despite significant walking/standing - and dancing on  Carn[employer] day.  Progressed exercises per bold in objective and second trial single leg (right) heel raise with short break between sets.  Challenged with power cord TKE on right in " "conjunction with left hip abduction "A little uncomfortable, but ok" after performance.  Patient very happy regarding overall progress and her ability to regain overall function and endurance for activity with only short term soreness.  Rates pain at "maybe 1/10 - good" after treatment.    Dyan Is progressing well towards her goals.   Pt prognosis is Good.     Pt will continue to benefit from skilled outpatient physical therapy to address the deficits listed in the problem list box on initial evaluation, provide pt/family education and to maximize pt's level of independence in the home and community environment. Pt's spiritual, cultural and educational needs considered and pt agreeable to plan of care and goals.    Anticipated barriers to physical therapy: chronicity    Goals:  Short Term Goals:  1. Pt will be independent with HEP supplement PT in improving functional mobility.  2. Pt will improve  right LE strength to at least 75% of left  LE strength as measured via MicroFet handheld dynamometer in order to improve functional mobility  3. Pt will improve right knee extension AROM to at least 0 in order to improve gait.     Long Term Goals:  1. Pt will be independent with updated HEP supplement PT in improving functional mobility.  2. Pt will improve right  LE strength to at least 90% of left LE strength as measured via MicroFet handheld dynamometer in order to improve functional mobility  3. Pt will improve FOTO knee survey score to </= 32% limited in order to demo improved functional mobility  4. Pt will improve score on LEFS by 9 pts in order to demo improved functional mobility  5.  Pt will perform at least 12 sit to stands without UE support on 30 second sit to stand test in order to demo improved ability to perform transfers     Plan     Restore terminal knee extension.  Right hamstring strengthening.  Lower extremity strength and proprioceptive control.    Saima Galvin, PTA              "

## 2023-02-27 ENCOUNTER — CLINICAL SUPPORT (OUTPATIENT)
Dept: REHABILITATION | Facility: HOSPITAL | Age: 53
End: 2023-02-27
Attending: ORTHOPAEDIC SURGERY
Payer: COMMERCIAL

## 2023-02-27 DIAGNOSIS — M25.661 DECREASED RANGE OF MOTION (ROM) OF RIGHT KNEE: Primary | ICD-10-CM

## 2023-02-27 DIAGNOSIS — R29.898 DECREASED STRENGTH OF LOWER EXTREMITY: ICD-10-CM

## 2023-02-27 PROCEDURE — 97110 THERAPEUTIC EXERCISES: CPT | Mod: PN

## 2023-02-27 NOTE — PROGRESS NOTES
"  Physical Therapy Daily Treatment Note     Name: Dyan Arredondo  [facility] Number: [id number]    Therapy Diagnosis:   Encounter Diagnoses   Name Primary?    Decreased range of motion (ROM) of right knee Yes    Decreased strength of lower extremity          Physician: Amilcar Ruiz MD    Visit Date: 2/27/2023    Physician Orders: PT Eval and Treat   Medical Diagnosis from Referral:   S83.271A (ICD-10-CM) - Complex tear of lat mensc, current injury, right knee, init   M17.11 (ICD-10-CM) - Primary osteoarthritis of right knee   Evaluation Date: 1/12/2023  Authorization Period Expiration: 12/31/2023  Plan of Care Expiration: 3/10/23  Visit # / Visits authorized: 13/20  FOTO #3: 4/5  PTA visit: 1/5    Time In: 11:05 am  Time Out: 11:58 am  Total Billable Time: 53 minutes    Precautions: Standard    Subjective     Pt reports: she was sitting a lot working and has minimal pain, just discomfort today.   She was compliant with home exercise program.  Response to previous treatment: decreased knee pain  Functional change: able to stand from chair without upper extremity assist    Pain: 1/10  Location: right knee      Objective   Suprapatellar swelling    0 deg extension post treatment  Tenderness at patellar tendon    Treatment     Dyan received therapeutic exercises to develop strength, endurance, ROM, and flexibility for 47 minutes including:  Straight leg raise -  3# 3x10  on R, 3# on L 3x10  Bridge 3x10 5" glute focus green theraband   Bridge Walk outs - x11 (for hamstrings - monitor lumbar extension) - not today- next  TKE with Hip abduction - teal power band for right knee; 3x10 + 2x10 with left abduction  Resisted lateral walking - green theraband at ankles; 16 steps laps x 2 (start wide and stay wide)  Seated knee flexion - blue therabands around ankle and proximal knee; 2x10 with 5 second holds   Long sit hamstring stretch 3 x 30" long sit bilateral    Shuttle leg press 2 top cords 3x10  Sidelying shuttle leg " "press 1/2 cord 2x10 left - right total 20 with multiple breaks  Standing heel raises:  2x5 single leg  Step up L1 1x10 bilateral   Lateral step up 1x10 bilateral   Knee extension isometric 80-90 deg 3x45   Long arc quad 20x 3" hold (alternating with isometric)      Not Today:  Recumbent bike:  Level 4 x 5 minutes, seat #14  Standing hip abduction red theraband 3x10 bilateral   Standing hip extension red theraband   3x10 bilateral   Standing hip flexion: red theraband 2 x 10   Sit to stand from chair:  2x10 no upper extremity support  Leg Press:  Sled 4, 6 plates 3x10 double leg      Dyan received the following manual therapy techniques: Joint mobilizations, Myofacial release, and Soft tissue Mobilization were applied to the: right knee for 0 minutes, including:  Right knee mensicus whip mobilization   Right lower extremity long axis distraction   Right tibial distraction   Patellar mobilizations       Therapeutic Activities to improve functional performance for 0 minutes, including:  Education (See below)  Questions and answers     Home Exercises Provided and Patient Education Provided     Education provided:   Anatomy and Pathology.  Symptom management and plan of care progressions.  Home Exercise Program.    Written Home Exercises Provided: Patient instructed to cont prior HEP.  Exercises were reviewed and Dyan was able to demonstrate them prior to the end of the session.  Dyan demonstrated good  understanding of the education provided.     See EMR under Patient Instructions for exercises provided  at [facility] on 1/12/2023 .      Assessment   Pt with steady decline of knee pain and presents with full extension knee range of motion. Added further standing activities this visit. Initial pain with first rep of step exercises which decreased with continued performance of activity. Added knee extension isometrics for patellar tendon loading as localized tenderness palpated at tendon today. Occasional "discomfort" " but no increased pain with today's activities. Continue to progress as tolerated.       Dyan Is progressing well towards her goals.   Pt prognosis is Good.     Pt will continue to benefit from skilled outpatient physical therapy to address the deficits listed in the problem list box on initial evaluation, provide pt/family education and to maximize pt's level of independence in the home and community environment. Pt's spiritual, cultural and educational needs considered and pt agreeable to plan of care and goals.    Anticipated barriers to physical therapy: chronicity    Goals:  Short Term Goals:  1. Pt will be independent with HEP supplement PT in improving functional mobility.  2. Pt will improve  right LE strength to at least 75% of left  LE strength as measured via MicroFet handheld dynamometer in order to improve functional mobility  3. Pt will improve right knee extension AROM to at least 0 in order to improve gait.     Long Term Goals:  1. Pt will be independent with updated HEP supplement PT in improving functional mobility.  2. Pt will improve right  LE strength to at least 90% of left LE strength as measured via MicroFet handheld dynamometer in order to improve functional mobility  3. Pt will improve FOTO knee survey score to </= 32% limited in order to demo improved functional mobility  4. Pt will improve score on LEFS by 9 pts in order to demo improved functional mobility  5.  Pt will perform at least 12 sit to stands without UE support on 30 second sit to stand test in order to demo improved ability to perform transfers     Plan     Restore terminal knee extension.  Right hamstring strengthening.  Lower extremity strength and proprioceptive control.    GUILLERMO BHAT, PT

## 2023-03-02 ENCOUNTER — CLINICAL SUPPORT (OUTPATIENT)
Dept: REHABILITATION | Facility: HOSPITAL | Age: 53
End: 2023-03-02
Attending: ORTHOPAEDIC SURGERY
Payer: COMMERCIAL

## 2023-03-02 DIAGNOSIS — R29.898 DECREASED STRENGTH OF LOWER EXTREMITY: ICD-10-CM

## 2023-03-02 DIAGNOSIS — M25.661 DECREASED RANGE OF MOTION (ROM) OF RIGHT KNEE: Primary | ICD-10-CM

## 2023-03-02 PROCEDURE — 97110 THERAPEUTIC EXERCISES: CPT | Mod: PN

## 2023-03-02 NOTE — PROGRESS NOTES
"  Physical Therapy Daily Treatment Note     [id number]    Therapy Diagnosis:   Encounter Diagnoses   Name Primary?    Decreased range of motion (ROM) of right knee Yes    Decreased strength of lower extremity            Physician: Amilcar Ruiz MD    Visit Date: 3/2/2023    Physician Orders: PT Eval and Treat   Medical Diagnosis from Referral:   S83.271A (ICD-10-CM) - Complex tear of lat mensc, current injury, right knee, init   M17.11 (ICD-10-CM) - Primary osteoarthritis of right knee   Evaluation Date: 1/12/2023  Authorization Period Expiration: 12/31/2023  Plan of Care Expiration: 3/10/23  Visit # / Visits authorized: 14/20  FOTO #3: 5/5  PTA visit: 1/5    Time In: 11:03 am  Time Out: 11:58 am  Total Billable Time: 55 minutes    Precautions: Standard    Subjective     Pt reports: she is having a really good day. She feels much better compared to when she started therapy but continues to live with an uncomfortable feeling in knee and is not yet back to her prior level of function or where she wants to be. Feels 60% improvement overall.  She was compliant with home exercise program.  Response to previous treatment: mild soreness, did not ice  Functional change: walking easier     Pain: 1/10  Location: right knee      Objective   Suprapatellar swelling    0 deg extension pre treatment  Tenderness at patellar tendon    Treatment     Dyan received therapeutic exercises to develop strength, endurance, ROM, and flexibility for 55 minutes including:  Straight leg raise -  3# 3x10  on R, 3# on L 3x10  Bridge 3x10 5" glute focus green theraband NP   Bridge Walk outs - x10 (for hamstrings - monitor lumbar extension)   TKE with Hip abduction - teal power band for right knee; 3x10 + 2x10 with left abduction  Resisted lateral walking - green theraband at ankles; 16 steps laps x 2 (start wide and stay wide)  Cybex knee flexion - 2x10 4.5 plates  Long sit hamstring stretch 3 x 30" " "long sit bilateral    Shuttle leg press 2 top cords 3x10 NP  Sidelying shuttle leg press 1/2 cord 2x10 left - right total 20 with multiple breaks- NP   Standing heel raises:  2x5 single leg  Step up L1 2x10  right only  Lateral step up 2x10 right only  Knee extension isometric 80-90 deg 3x30" today  Long arc quad 3x10  3" hold (alternating with isometric)      Not Today:  Recumbent bike:  Level 4 x 5 minutes, seat #14  Standing hip abduction red theraband 3x10 bilateral   Standing hip extension red theraband   3x10 bilateral   Standing hip flexion: red theraband 2 x 10   Sit to stand from chair:  2x10 no upper extremity support  Leg Press:  Sled 4, 6 plates 3x10 double leg      Dyan received the following manual therapy techniques: Joint mobilizations, Myofacial release, and Soft tissue Mobilization were applied to the: right knee for 0 minutes, including:  Right knee mensicus whip mobilization   Right lower extremity long axis distraction   Right tibial distraction   Patellar mobilizations       Therapeutic Activities to improve functional performance for 0 minutes, including:  Education (See below)  Questions and answers     Home Exercises Provided and Patient Education Provided     Education provided:   Anatomy and Pathology.  Symptom management and plan of care progressions.  Home Exercise Program.    Written Home Exercises Provided: Patient instructed to cont prior HEP.  Exercises were reviewed and Dyan was able to demonstrate them prior to the end of the session.  Dyan demonstrated good  understanding of the education provided.     See EMR under Patient Instructions for exercises provided  at [facility] on 1/12/[facility]23 .      Assessment   Pt presents with minimal knee pain, with mild complaints of feeling "uncomfortable." Pt verbalizes ability to move more freely without having to think about her knee but has stiffness with sitting and standing long periods of time. 60% overall improvement from initial " evaluation. Pt with good tolerance for today's progressions of closed chain strengthening this visit.  Good knee range of motion noted. Continue to monitor and progress as tolerated. Will update plan of care next.       Dyan Is progressing well towards her goals.   Pt prognosis is Good.     Pt will continue to benefit from skilled outpatient physical therapy to address the deficits listed in the problem list box on initial evaluation, provide pt/family education and to maximize pt's level of independence in the home and community environment. Pt's spiritual, cultural and educational needs considered and pt agreeable to plan of care and goals.    Anticipated barriers to physical therapy: chronicity    Goals:  Short Term Goals:  1. Pt will be independent with HEP supplement PT in improving functional mobility.  2. Pt will improve  right LE strength to at least 75% of left  LE strength as measured via MicroFet handheld dynamometer in order to improve functional mobility  3. Pt will improve right knee extension AROM to at least 0 in order to improve gait.     Long Term Goals:  1. Pt will be independent with updated HEP supplement PT in improving functional mobility.  2. Pt will improve right  LE strength to at least 90% of left LE strength as measured via MicroFet handheld dynamometer in order to improve functional mobility  3. Pt will improve FOTO knee survey score to </= 32% limited in order to demo improved functional mobility  4. Pt will improve score on LEFS by 9 pts in order to demo improved functional mobility  5.  Pt will perform at least 12 sit to stands without UE support on 30 second sit to stand test in order to demo improved ability to perform transfers     Plan     Restore terminal knee extension.  Right hamstring strengthening.  Lower extremity strength and proprioceptive control.    GUILLERMO BHAT, PT

## 2023-03-06 ENCOUNTER — CLINICAL SUPPORT (OUTPATIENT)
Dept: REHABILITATION | Facility: HOSPITAL | Age: 53
End: 2023-03-06
Attending: ORTHOPAEDIC SURGERY
Payer: COMMERCIAL

## 2023-03-06 DIAGNOSIS — M25.661 DECREASED RANGE OF MOTION (ROM) OF RIGHT KNEE: Primary | ICD-10-CM

## 2023-03-06 DIAGNOSIS — R29.898 DECREASED STRENGTH OF LOWER EXTREMITY: ICD-10-CM

## 2023-03-06 PROCEDURE — 97140 MANUAL THERAPY 1/> REGIONS: CPT | Mod: PN

## 2023-03-06 PROCEDURE — 97110 THERAPEUTIC EXERCISES: CPT | Mod: PN

## 2023-03-06 NOTE — PLAN OF CARE
Physical Therapy plan of care      Name: Dyan Arredondo  [facility] Number: [id number]    Therapy Diagnosis:   Encounter Diagnoses   Name Primary?    Decreased range of motion (ROM) of right knee Yes    Decreased strength of lower extremity      Physician: Amilcar Ruiz MD    Visit Date: 3/6/2023    Physician Orders: PT Eval and Treat   Medical Diagnosis from Referral:   S83.271A (ICD-10-CM) - Complex tear of lat mensc, current injury, right knee, init   M17.11 (ICD-10-CM) - Primary osteoarthritis of right knee   Evaluation Date: 1/12/2023  Authorization Period Expiration: 12/31/2023  Plan of Care Expiration: 4/7/23  Visit # / Visits authorized: 15/20  FOTO #3: 3rd 3/6/23  PTA visit: 0/5    Time In: 11:03 am  Time Out: 11:58 am  Total Billable Time: 55 minutes    Precautions: Standard    Subjective     Pt reports: she had several good days but increased pain this morning. She had some morning stiffness, took a few steps and now having increased pain and swelling. She does note increased household chores. 60% overall improvement.     She was compliant with home exercise program.  Response to previous treatment: mild soreness, did not ice  Functional change: walking easier     Pain: 3/10  Location: right knee      Objective   Posture: slight knee valgus   Palpation: right knee: suprapatellar swelling, lateral joint line tenderness  knee swelling at superior pole of patella 52.2cm     AROM: *denotes pain  left knee: 0-125  right knee: 2*(1 post tx)-125      PROM: *denotes pain  left knee: 1-0-125  right knee: 0-125-mild joint line discomfort with max flexion     L/E Strength w/ MicroFET Muscle Jose Antonio Dynamometer Right Left Pain/Dysfunction with Movement   (approx 4 sec hold w/ max contraction)   Hip Flexion 19.1 kg  18.6 kg      Hip Abduction 20.1 kg  16.2 kg      Quadriceps 20.2 kg  20.8 kg      Hamstrings 15.5 kg  17.9kg         Special tests:   positive McMurrays:Lateral meniscus click and pain     Meniscal tear  CPR:  (4/5 LR+= 4.28, 5/5 LR+ 11.2)  History of catching/locking reported   N  Joint line tenderness                           Y  Pain with bounce knee hyperextension          Y  Pain with maximal knee flexion                      Y           Pain/audible click with Grey test            Y (pain)  --> 4/5 (LR 4.28)     30 second sit-to-stand test (without U/E support): 12 (thigh support) (discomfort thighs)      Gait Analysis: minimally decreased Terminal knee extension        Impairment/Limitation/Restriction for KOOS, Functional, Daily Living     Therapist reviewed LEFS scores for Dyan Arredondo on 3/6/2023.   LEFS documents entered into [employer] - see Media section.     Limitation Score: 38.7     Limitation for FOTO Knee Survey  Limitation Score: 48%     Treatment     See daily note for treatment.       Home Exercises Provided and Patient Education Provided     Education provided:   Anatomy and Pathology.  Symptom management and plan of care progressions.  Home Exercise Program.    Written Home Exercises Provided: Patient instructed to cont prior HEP.  Exercises were reviewed and Dyan was able to demonstrate them prior to the end of the session.  Dyan demonstrated good  understanding of the education provided.     See EMR under Patient Instructions for exercises provided at [facility] on 1/12/2023.      Assessment   Dyan is a 52 y.o. female referred to outpatient Physical Therapy with a medical diagnosis of Complex tear of lat mensc, current injury, right knee, initial, and primary osteoarthritis of right knee. Pt has been to 16 visits to date since evaluation. She reports subjective improvement of 60%. She has good days and bad days. Able to partially achieve subjective goal in participation in [employer] parades and walk 1 mile. Knee strength and range of motion have made good improvements with full passive range of motion and near full active range of motion. Improved extension and pain following manual  techniques. She presents today with slight exacerbation with increased  suprapatellar swelling and discomfort in joint line and posterior knee. Ongoing mild mechanical symptoms including clicking. She is progressing well towards goals and will continue to benefit from skilled PT. Will refer back to physician if swelling and clicking continues as no follow up scheduled.      Dyan Is progressing well towards her goals.   Pt prognosis is Good.     Pt will continue to benefit from skilled outpatient physical therapy to address the deficits listed in the problem list box on initial evaluation, provide pt/family education and to maximize pt's level of independence in the home and community environment. Pt's spiritual, cultural and educational needs considered and pt agreeable to plan of care and goals.    Anticipated barriers to physical therapy: chronicity    Goals:  Short Term Goals:  1. Pt will be independent with HEP supplement PT in improving functional mobility. Met   2. Pt will improve  right LE strength to at least 75% of left  LE strength as measured via MicroFet handheld dynamometer in order to improve functional mobility. Met  3. Pt will improve right knee extension AROM to at least 0 in order to improve gait. Near met      Long Term Goals:  1. Pt will be independent with updated HEP supplement PT in improving functional mobility.progressing, not met  2. Pt will improve right  LE strength to at least 90% of left LE strength as measured via MicroFet handheld dynamometer in order to improve functional mobility. Progressing, not met   3. Pt will improve FOTO knee survey score to </= 32% limited in order to demo improved functional mobility. Progressing, not met  4. Pt will improve score on LEFS by 9 pts in order to demo improved functional mobility. Progressing, not met  5.  Pt will perform at least 12 sit to stands without UE support on 30 second sit to stand test in order to demo improved ability to perform  transfers. Met with upper extremity support     Plan     Restore terminal knee extension.  Right hamstring strengthening.  Lower extremity strength and proprioceptive control.   Plan of care certification 2x/week 5 weeks 3/6/23- 4/7/23    GUILLERMO BHAT, PT    I certify the need for these services furnished under this plan of treatment and while under my care.        Amilcar Ruiz MD, FAAOS  , Orthopaedic Sports Medicine  Residency   [facility] Department of Orthopaedic Surgery  Assistant Orthopaedic Surgeon, [facility]  Head Team Physician, [facility]                   detailed exam

## 2023-03-06 NOTE — PROGRESS NOTES
Physical Therapy Daily Treatment Note/ plan of care      Name: Dyan Arredondo  [facility] Number: [id number]    Therapy Diagnosis:   Encounter Diagnoses   Name Primary?    Decreased range of motion (ROM) of right knee Yes    Decreased strength of lower extremity        Physician: Amilcar Ruiz MD    Visit Date: 3/6/2023    Physician Orders: PT Eval and Treat   Medical Diagnosis from Referral:   S83.271A (ICD-10-CM) - Complex tear of lat mensc, current injury, right knee, init   M17.11 (ICD-10-CM) - Primary osteoarthritis of right knee   Evaluation Date: 1/12/2023  Authorization Period Expiration: 12/31/2023  Plan of Care Expiration: 4/7/23  Visit # / Visits authorized: 15/20  FOTO #3: 3rd 3/6/23  PTA visit: 0/5    Time In: 11:03 am  Time Out: 11:58 am  Total Billable Time: 55 minutes    Precautions: Standard    Subjective     Pt reports: she had several good days but increased pain this morning. She had some morning stiffness, took a few steps and now having increased pain and swelling. She does note increased household chores.     She was compliant with home exercise program.  Response to previous treatment: mild soreness, did not ice  Functional change: walking easier     Pain: 3/10  Location: right knee      Objective   Posture: slight knee valgus   Palpation: right knee: suprapatellar swelling, lateral joint line tenderness  knee swelling at superior pole of patella 52.2cm     AROM: *denotes pain  left knee: 0-125  right knee: 2*(1 post tx)-125      PROM: *denotes pain  left knee: 1-0-125  right knee: 0-125-mild joint line discomfort with max flexion     L/E Strength w/ MicroFET Muscle Jose Antonio Dynamometer Right Left Pain/Dysfunction with Movement   (approx 4 sec hold w/ max contraction)   Hip Flexion 19.1 kg  18.6 kg      Hip Abduction 20.1 kg  16.2 kg      Quadriceps 20.2 kg  20.8 kg      Hamstrings 15.5 kg  17.9kg         Special tests:   positive McMurrays:Lateral meniscus click and pain     Meniscal tear  "CPR:  (4/5 LR+= 4.28, 5/5 LR+ 11.2)  History of catching/locking reported   N  Joint line tenderness                           Y  Pain with bounce knee hyperextension          Y  Pain with maximal knee flexion                      Y           Pain/audible click with Grey test            Y (pain)  --> 4/5 (LR 4.28)     30 second sit-to-stand test (without U/E support): 12 (thigh support) (discomfort thighs)      Gait Analysis: minimally decreased Terminal knee extension        Impairment/Limitation/Restriction for KOOS, Functional, Daily Living     Therapist reviewed LEFS scores for Dyan Arredondo on 3/6/2023.   LEFS documents entered into [employer] - see Media section.     Limitation Score: 38.7     Limitation for FOTO Knee Survey  Limitation Score: 48%     Treatment     Dyan received therapeutic exercises to develop strength, endurance, ROM, and flexibility for 40 minutes including:  Objective measures  Straight leg raise -  regressed to 2# 3x10  on R, 3# on L 3x10  Bridge 3x10 5" glute focus green theraband NP   Bridge Walk outs - x10 (for hamstrings - monitor lumbar extension) NP  TKE with Hip abduction - teal power band for right knee; 3x10 + 2x10 with left abduction NP  Resisted lateral walking - green theraband at ankles; 16 steps laps x 2 (start wide and stay wide) NP  Cybex knee flexion - 2x10 4.5 plates NP  Long sit hamstring stretch 3 x 30" long sit bilateral    Shuttle leg press 2 top cords 3x10 NP  Sidelying shuttle leg press 1/2 cord 2x10 left - right total 20 with multiple breaks- NP   Standing heel raises:  2x5 single leg  Step up L1 2x10  right only  Lateral step up 2x10 right only  Knee extension isometric 80-90 deg 3x45" today  Long arc quad 3x10  3" hold (alternating with isometric)      Not Today:  Recumbent bike:  Level 4 x 5 minutes, seat #14  Standing hip abduction red theraband 3x10 bilateral   Standing hip extension red theraband   3x10 bilateral   Standing hip flexion: red theraband 2 x " 10   Sit to stand from chair:  2x10 no upper extremity support  Leg Press:  Sled 4, 6 plates 3x10 double leg      Dyan received the following manual therapy techniques: Joint mobilizations, Myofacial release, and Soft tissue Mobilization were applied to the: right knee for 10 minutes, including:  Right knee mensicus whip mobilization   Right lower extremity long axis distraction   Right tibial distraction   Patellar mobilizations       Therapeutic Activities to improve functional performance for 0 minutes, including:  Education (See below)  Questions and answers     Home Exercises Provided and Patient Education Provided     Education provided:   Anatomy and Pathology.  Symptom management and plan of care progressions.  Home Exercise Program.    Written Home Exercises Provided: Patient instructed to cont prior HEP.  Exercises were reviewed and Dyan was able to demonstrate them prior to the end of the session.  Dyan demonstrated good  understanding of the education provided.     See EMR under Patient Instructions for exercises provided  at [facility] on 1/12/2023 .      Assessment   Dyan is a 52 y.o. female referred to outpatient Physical Therapy with a medical diagnosis of Complex tear of lat mensc, current injury, right knee, initial, and primary osteoarthritis of right knee. Pt has been to 16 visits to date since evaluation. She reports subjective improvement of 60%. She has good days and bad days. Able to partially achieve subjective goal in participation in [employer] and walk 1 mile. Knee strength and range of motion have made good improvements with full passive range of motion and near full active range of motion. Improved extension and pain following manual techniques. She presents today with slight exacerbation with increased  suprapatellar swelling and discomfort in joint line and posterior knee. Ongoing mild mechanical symptoms including clicking. She is progressing well towards goals and will  continue to benefit from skilled PT. Will refer back to physician if swelling and clicking continues.      Dyan Is progressing well towards her goals.   Pt prognosis is Good.     Pt will continue to benefit from skilled outpatient physical therapy to address the deficits listed in the problem list box on initial evaluation, provide pt/family education and to maximize pt's level of independence in the home and community environment. Pt's spiritual, cultural and educational needs considered and pt agreeable to plan of care and goals.    Anticipated barriers to physical therapy: chronicity    Goals:  Short Term Goals:  1. Pt will be independent with HEP supplement PT in improving functional mobility. Met   2. Pt will improve  right LE strength to at least 75% of left  LE strength as measured via MicroFet handheld dynamometer in order to improve functional mobility. Met  3. Pt will improve right knee extension AROM to at least 0 in order to improve gait. Near met      Long Term Goals:  1. Pt will be independent with updated HEP supplement PT in improving functional mobility.progressing, not met  2. Pt will improve right  LE strength to at least 90% of left LE strength as measured via MicroFet handheld dynamometer in order to improve functional mobility. Progressing, not met   3. Pt will improve FOTO knee survey score to </= 32% limited in order to demo improved functional mobility. Progressing, not met  4. Pt will improve score on LEFS by 9 pts in order to demo improved functional mobility. Progressing, not met  5.  Pt will perform at least 12 sit to stands without UE support on 30 second sit to stand test in order to demo improved ability to perform transfers. Met with upper extremity support     Plan     Restore terminal knee extension.  Right hamstring strengthening.  Lower extremity strength and proprioceptive control.   Plan of care certification 2x/week 5 weeks 3/6/23- 4/7/23    GUILLERMO BHAT  PT

## 2023-03-10 ENCOUNTER — CLINICAL SUPPORT (OUTPATIENT)
Dept: REHABILITATION | Facility: HOSPITAL | Age: 53
End: 2023-03-10
Attending: ORTHOPAEDIC SURGERY
Payer: COMMERCIAL

## 2023-03-10 DIAGNOSIS — R29.898 DECREASED STRENGTH OF LOWER EXTREMITY: ICD-10-CM

## 2023-03-10 DIAGNOSIS — M25.661 DECREASED RANGE OF MOTION (ROM) OF RIGHT KNEE: Primary | ICD-10-CM

## 2023-03-10 PROCEDURE — 97140 MANUAL THERAPY 1/> REGIONS: CPT | Mod: PN

## 2023-03-10 PROCEDURE — 97110 THERAPEUTIC EXERCISES: CPT | Mod: PN

## 2023-03-10 NOTE — PROGRESS NOTES
"  Physical Therapy Daily Treatment Note     [facility] [id number]    Therapy Diagnosis:   Encounter Diagnoses   Name Primary?    Decreased range of motion (ROM) of right knee Yes    Decreased strength of lower extremity        Physician: Amilcar Ruiz MD    Visit Date: 3/10/2023    Physician Orders: PT Eval and Treat   Medical Diagnosis from Referral:   S83.271A (ICD-10-CM) - Complex tear of lat mensc, current injury, right knee, init   M17.11 (ICD-10-CM) - Primary osteoarthritis of right knee   Evaluation Date: 1/12/2023  Authorization Period Expiration: 12/31/2023  Plan of Care Expiration: 4/7/23  Visit # / Visits authorized: 16/20  FOTO #3: 3rd 3/6/23  PTA visit: 0/5    Time In: 12:00 pm  Time Out: 12:58 pm  Total Billable Time: 58 minutes    Precautions: Standard    Subjective     Pt reports: she is sore today. Was sitting a lot and bent over and had a lot of knee pain. Woke up feeling better tomorrow. She had some pain with going up and down stairs.     She was compliant with home exercise program.  Response to previous treatment: mild soreness, did not ice  Functional change: walking easier     Pain: 3/10  Location: right knee      Objective   Objective measures at plan of care 3/6/23    Suprapatellar swelling 53cm   5-128    Treatment     Dyan received therapeutic exercises to develop strength, endurance, ROM, and flexibility for 40 minutes including:  Straight leg raise -  regressed to 2# 3x10  on R, 3# on L 3x10  Bridge 3x10 5" glute focus green theraband NP   Bridge Walk outs - x10 (for hamstrings - monitor lumbar extension) NP  TKE with Hip abduction - teal power band for right knee; 3x10 + 2x10 with left abduction NP  Resisted lateral walking - green theraband at ankles; 16 steps laps x 2 (start wide and stay wide) NP  Cybex knee flexion - 2x10 4.5 plates NP  Long sit hamstring stretch 3 x 30" long sit bilateral    Shuttle leg press 2 top cords 3x10 NP  Sidelying " "shuttle leg press 1/2 cord 2x10 left - right total 20 with multiple breaks- NP   Standing heel raises:  2x5 single leg  Step up L1 2x10  right only  Lateral step up 2x10 right only  Knee extension isometric 80-90 deg 3x45" today NP   Long arc quad 3x10  3" hold (alternating with isometric)  Leg Press:  Sled 4, 6 plates 3x10 double leg    Dyan received the following manual therapy techniques: Joint mobilizations, Myofacial release, and Soft tissue Mobilization were applied to the: right knee for 15 minutes, including:  Right knee mensicus whip mobilization   Right lower extremity long axis distraction   Right tibial distraction   Patellar mobilizations       Therapeutic Activities to improve functional performance for 0 minutes, including:  Education (See below)  Questions and answers     Home Exercises Provided and Patient Education Provided     Education provided:   Anatomy and Pathology.  Symptom management and plan of care progressions.  Home Exercise Program.    Written Home Exercises Provided: Patient instructed to cont prior HEP.  Exercises were reviewed and Dyan was able to demonstrate them prior to the end of the session.  Dyan demonstrated good  understanding of the education provided.     See EMR under Patient Instructions for exercises provided  at [facility] on 1/12/2023 .      Assessment   Dyan is a 52 y.o. female referred to outpatient Physical Therapy with a medical diagnosis of Complex tear of lat mensc, current injury, right knee, initial, and primary osteoarthritis of right knee.     Pt presents with increased swelling and 5 deg TKE deficit at start of session. Swelling nearly 1cm greater than prior visit. Good response to manual techniques on pain and knee mobility with slight improved flexion following. Increased pain and swelling limited today's progressions, however able to tolerate addition of low load single leg strengthening and open and closed kinetic chain activities without " exacerbation. Pt reports reduction in pain following session. Discussed continued of ice and compression for knee swelling. Pt will plan to reach out to MD to schedule potential follow up as fluid continues to be a limiting factor.       Dyan Is progressing well towards her goals.   Pt prognosis is Good.     Pt will continue to benefit from skilled outpatient physical therapy to address the deficits listed in the problem list box on initial evaluation, provide pt/family education and to maximize pt's level of independence in the home and community environment. Pt's spiritual, cultural and educational needs considered and pt agreeable to plan of care and goals.    Anticipated barriers to physical therapy: chronicity    Goals:  Short Term Goals:  1. Pt will be independent with HEP supplement PT in improving functional mobility. Met   2. Pt will improve  right LE strength to at least 75% of left  LE strength as measured via MicroFet handheld dynamometer in order to improve functional mobility. Met  3. Pt will improve right knee extension AROM to at least 0 in order to improve gait. Near met      Long Term Goals:  1. Pt will be independent with updated HEP supplement PT in improving functional mobility.progressing, not met  2. Pt will improve right  LE strength to at least 90% of left LE strength as measured via MicroFet handheld dynamometer in order to improve functional mobility. Progressing, not met   3. Pt will improve FOTO knee survey score to </= 32% limited in order to demo improved functional mobility. Progressing, not met  4. Pt will improve score on LEFS by 9 pts in order to demo improved functional mobility. Progressing, not met  5.  Pt will perform at least 12 sit to stands without UE support on 30 second sit to stand test in order to demo improved ability to perform transfers. Met with upper extremity support     Plan     Restore terminal knee extension.  Right hamstring strengthening.  Lower extremity  strength and proprioceptive control.   Plan of care certification 2x/week 5 weeks 3/6/23- 4/7/23    GULILERMO BHAT, PT

## 2023-03-13 ENCOUNTER — CLINICAL SUPPORT (OUTPATIENT)
Dept: REHABILITATION | Facility: HOSPITAL | Age: 53
End: 2023-03-13
Payer: COMMERCIAL

## 2023-03-13 DIAGNOSIS — M25.661 DECREASED RANGE OF MOTION (ROM) OF RIGHT KNEE: Primary | ICD-10-CM

## 2023-03-13 DIAGNOSIS — R29.898 DECREASED STRENGTH OF LOWER EXTREMITY: ICD-10-CM

## 2023-03-13 PROCEDURE — 97110 THERAPEUTIC EXERCISES: CPT | Mod: PN,CQ

## 2023-03-13 PROCEDURE — 97140 MANUAL THERAPY 1/> REGIONS: CPT | Mod: PN,CQ

## 2023-03-13 NOTE — PROGRESS NOTES
"  Physical Therapy Daily Treatment Note     Name: Dyan Arredondo  [facility] Number: [id number]    Therapy Diagnosis:   Encounter Diagnoses   Name Primary?    Decreased range of motion (ROM) of right knee Yes    Decreased strength of lower extremity        Physician: No ref. provider found    Visit Date: 3/13/2023    Physician Orders: PT Eval and Treat   Medical Diagnosis from Referral:   S83.271A (ICD-10-CM) - Complex tear of lat mensc, current injury, right knee, init   M17.11 (ICD-10-CM) - Primary osteoarthritis of right knee   Evaluation Date: 1/12/2023  Authorization Period Expiration: 12/31/2023  Plan of Care Expiration: 4/7/23  Visit # / Visits authorized: 17/20 + eval  FOTO #3: 3rd 3/6/23  PTA visit: 1/5    Time In: 11:05 am  Time Out: 12:05 pm  Total Billable Time: 30 minutes     Precautions: Standard    Subjective     Pt reports: that today is a bad knee day pre tx. Pt stated that she has noticed increased swelling and pain the last 2 weeks w/ no changes in activity. Pt stated that she did some prolonged sitting yesterday which may have contributed to swelling. Pt stated that she is traveling on a plane for 5 hours tomorrow. Pt reported that her old brace/sleeve did not fit and ordered a new one that should be arriving today for her travels.     She was compliant with home exercise program.  Response to previous treatment: mild soreness, did not ice  Functional change: walking easier     Pain: 3/10  Location: right knee      Objective   Objective measures at plan of care 3/6/23    Treatment     Dyan received therapeutic exercises to develop strength, endurance, ROM, and flexibility for 25 minutes including:  Straight leg raise -  regressed to 0# 3x10  on R, 3# on L 3x10  Bridge 3x10 5" glute focus (no green band today)  TKE black theraband R only 2x10 5"  Leg Press:  Sled 4, regressed to 5 plates 3x10 double leg , 3 plates 2x10 single leg    Not today:  Bridge Walk outs - x10 (for hamstrings - monitor lumbar " "extension) NP  TKE with Hip abduction - teal power band for right knee; 3x10 + 2x10 with left abduction NP  Resisted lateral walking - green theraband at ankles; 16 steps laps x 2 (start wide and stay wide) NP  Cybex knee flexion - 2x10 4.5 plates NP  Long sit hamstring stretch 3 x 30" long sit bilateral    Shuttle leg press 2 top cords 3x10 NP  Sidelying shuttle leg press 1/2 cord 2x10 left - right total 20 with multiple breaks- NP   Standing heel raises:  2x5 single leg  Step up L1 2x10  right only  Lateral step up 2x10 right only  Knee extension isometric 80-90 deg 3x45" today NP   Long arc quad 3x10  3" hold (alternating with isometric)      Dyan received the following manual therapy techniques: Joint mobilizations, Myofacial release, and Soft tissue Mobilization were applied to the: right knee for 30 minutes, including:  Right knee mensicus whip mobilization -> NP  Right lower extremity long axis distraction   Right tibial distraction -> NP  Patellar mobilizations   Retrograde edema massage w/ R LE elevated on wedge       Therapeutic Activities to improve functional performance for 5 minutes, including:  Education (See below)  Questions and answers     Home Exercises Provided and Patient Education Provided     Education provided:   Anatomy and Pathology.  Symptom management and plan of care progressions.  Home Exercise Program - updated for plane ride 3/13  Self patella mobs grade I    Written Home Exercises Provided: Patient instructed to cont prior HEP.  Exercises were reviewed and Dyan was able to demonstrate them prior to the end of the session.  Dyan demonstrated good  understanding of the education provided.     See EMR under Patient Instructions for exercises provided  at [facility] on 1/12/2023 .      Assessment     Pt w/ good response to all MT today as pt reported relief. Pt w/ good leonardo of instructed exercises today as modifications were made to address elevated pain levels. Pt required min verbal " cues throughout tx to complete exercises w/ proper technique. Pt w/ good understanding of all education given today including good demonstration of self patella mobs for pain control. Pt declined ice as she will apply at home.     Dyan Is progressing well towards her goals.   Pt prognosis is Good.     Pt will continue to benefit from skilled outpatient physical therapy to address the deficits listed in the problem list box on initial evaluation, provide pt/family education and to maximize pt's level of independence in the home and community environment. Pt's spiritual, cultural and educational needs considered and pt agreeable to plan of care and goals.    Anticipated barriers to physical therapy: chronicity    Goals:  Short Term Goals:  1. Pt will be independent with HEP supplement PT in improving functional mobility. Met   2. Pt will improve  right LE strength to at least 75% of left  LE strength as measured via MicroFet handheld dynamometer in order to improve functional mobility. Met  3. Pt will improve right knee extension AROM to at least 0 in order to improve gait. Near met      Long Term Goals:  1. Pt will be independent with updated HEP supplement PT in improving functional mobility.progressing, not met  2. Pt will improve right  LE strength to at least 90% of left LE strength as measured via MicroFet handheld dynamometer in order to improve functional mobility. Progressing, not met   3. Pt will improve FOTO knee survey score to </= 32% limited in order to demo improved functional mobility. Progressing, not met  4. Pt will improve score on LEFS by 9 pts in order to demo improved functional mobility. Progressing, not met  5.  Pt will perform at least 12 sit to stands without UE support on 30 second sit to stand test in order to demo improved ability to perform transfers. Met with upper extremity support     Plan     Restore terminal knee extension.  Right hamstring strengthening.  Lower extremity strength  and proprioceptive control.   Plan of care certification 2x/week 5 weeks 3/6/23- 4/7/23    Tessy Hernandez PTA

## 2023-03-14 ENCOUNTER — PATIENT OUTREACH (OUTPATIENT)
Dept: ADMINISTRATIVE | Facility: HOSPITAL | Age: 53
End: 2023-03-14
Payer: COMMERCIAL

## 2023-03-14 ENCOUNTER — PATIENT MESSAGE (OUTPATIENT)
Dept: ADMINISTRATIVE | Facility: HOSPITAL | Age: 53
End: 2023-03-14
Payer: COMMERCIAL

## 2023-03-14 NOTE — PROGRESS NOTES
2023 Care Everywhere updates requested and reviewed.  Immunizations reconciled. Media reports reviewed.  Duplicate HM overrides and  orders removed.  Overdue HM topic chart audit and/or requested.  Overdue lab testing linked to upcoming lab appointments if applies.  Lab gary, and [employer] reviewed   Portal outreached regarding Health maintenance     Health Maintenance Due   Topic Date Due    Colorectal Cancer Screening  Never done    Shingles Vaccine (1 of 2) Never done    COVID-19 Vaccine (3 - Booster for Moderna series) 2021

## 2023-03-23 ENCOUNTER — CLINICAL SUPPORT (OUTPATIENT)
Dept: REHABILITATION | Facility: HOSPITAL | Age: 53
End: 2023-03-23
Payer: COMMERCIAL

## 2023-03-23 DIAGNOSIS — R29.898 DECREASED STRENGTH OF LOWER EXTREMITY: ICD-10-CM

## 2023-03-23 DIAGNOSIS — M25.661 DECREASED RANGE OF MOTION (ROM) OF RIGHT KNEE: Primary | ICD-10-CM

## 2023-03-23 PROCEDURE — 97140 MANUAL THERAPY 1/> REGIONS: CPT | Mod: PN

## 2023-03-23 PROCEDURE — 97110 THERAPEUTIC EXERCISES: CPT | Mod: PN

## 2023-03-23 NOTE — PROGRESS NOTES
"  Physical Therapy Daily Treatment Note     Name: Dyan Arredondo  [facility] Number: [id number]    Therapy Diagnosis:   Encounter Diagnoses   Name Primary?    Decreased range of motion (ROM) of right knee Yes    Decreased strength of lower extremity        Physician: No ref. provider found    Visit Date: 3/23/2023    Physician Orders: PT Eval and Treat   Medical Diagnosis from Referral:   S83.271A (ICD-10-CM) - Complex tear of lat mensc, current injury, right knee, init   M17.11 (ICD-10-CM) - Primary osteoarthritis of right knee   Evaluation Date: 1/12/2023  Authorization Period Expiration: 12/31/2023  Plan of Care Expiration: 4/7/23  Visit # / Visits authorized: 18/20 + eval  FOTO #3: 3rd 3/6/23  PTA visit: 1/5    Time In: 11:05 am  Time Out: 11:58 pm  Total Billable Time: 53 minutes     Precautions: Standard    Subjective     Pt reports: she continues to feel like shes in a valley with increased swelling.  She has difficulty sleeping. She still feels able to straighten it   She was compliant with home exercise program.  Response to previous treatment: mild soreness, did not ice  Functional change: walking easier     Pain: 3/10  Location: right knee      Objective   Objective measures at plan of care 3/6/23  Suprapatellar swelling 53.2cm  Treatment     Dyan received therapeutic exercises to develop strength, endurance, ROM, and flexibility for 40 minutes including:  Straight leg raise -  regressed to 0-1# 3x10  on R, 3# on L 3x10  Bridge 3x10 5" glute focus (no green band today)  TKE black theraband R only 2x10 5"  Leg Press:  Sled 4, regressed to 6 plates 3x10 double leg , 3 plates 2x10 single leg  Long arc quad 3x10  3" hold 3#  Tailgates 7# 2'  Mini squats 3x7  Double leg heel raises 30x    Not today:  Bridge Walk outs - x10 (for hamstrings - monitor lumbar extension) NP  TKE with Hip abduction - teal power band for right knee; 3x10 + 2x10 with left abduction NP  Resisted lateral walking - green theraband at " "ankles; 16 steps laps x 2 (start wide and stay wide) NP  Cybex knee flexion - 2x10 4.5 plates NP  Long sit hamstring stretch 3 x 30" long sit bilateral    Shuttle leg press 2 top cords 3x10 NP  Sidelying shuttle leg press 1/2 cord 2x10 left - right total 20 with multiple breaks- NP   Standing heel raises:  2x5 single leg  Step up L1 2x10  right only  Lateral step up 2x10 right only  Knee extension isometric 80-90 deg 3x45" today NP         Dyan received the following manual therapy techniques: Joint mobilizations, Myofacial release, and Soft tissue Mobilization were applied to the: right knee for 13 minutes, including:  Right knee mensicus whip mobilization -> NP  Right lower extremity long axis distraction/ short axis tibial distraction  Right tibial distraction -> NP  Patellar mobilizations   Retrograde edema massage w/ R LE elevated on wedge NP      Therapeutic Activities to improve functional performance for 5 minutes, including:  Education (See below)  Questions and answers     Home Exercises Provided and Patient Education Provided     Education provided:   Anatomy and Pathology.  Symptom management and plan of care progressions.  Home Exercise Program - updated for plane ride 3/13  Self patella mobs grade I    Written Home Exercises Provided: Patient instructed to cont prior HEP.  Exercises were reviewed and Dyan was able to demonstrate them prior to the end of the session.  Dyan demonstrated good  understanding of the education provided.     See EMR under Patient Instructions for exercises provided  at [facility] [place] on 1/12/2023 .      Assessment     Pt has maintained excess knee swelling without significant change over last few visits. Pain and swelling impacting flexion range of motion and impairing sleep. Continued to perform manual techniques to improve knee mobility and reduce knee pressure with good response. Performed there-ex as tolerated with focus on quad activation. MD informed of recent increase " in swelling and pt will reach out to schedule follow up for further evaluation. Continue to progress as tolerated.     Dyan Is progressing well towards her goals.   Pt prognosis is Good.     Pt will continue to benefit from skilled outpatient physical therapy to address the deficits listed in the problem list box on initial evaluation, provide pt/family education and to maximize pt's level of independence in the home and community environment. Pt's spiritual, cultural and educational needs considered and pt agreeable to plan of care and goals.    Anticipated barriers to physical therapy: chronicity    Goals:  Short Term Goals:  1. Pt will be independent with HEP supplement PT in improving functional mobility. Met   2. Pt will improve  right LE strength to at least 75% of left  LE strength as measured via MicroFet handheld dynamometer in order to improve functional mobility. Met  3. Pt will improve right knee extension AROM to at least 0 in order to improve gait. Near met      Long Term Goals:  1. Pt will be independent with updated HEP supplement PT in improving functional mobility.progressing, not met  2. Pt will improve right  LE strength to at least 90% of left LE strength as measured via MicroFet handheld dynamometer in order to improve functional mobility. Progressing, not met   3. Pt will improve FOTO knee survey score to </= 32% limited in order to demo improved functional mobility. Progressing, not met  4. Pt will improve score on LEFS by 9 pts in order to demo improved functional mobility. Progressing, not met  5.  Pt will perform at least 12 sit to stands without UE support on 30 second sit to stand test in order to demo improved ability to perform transfers. Met with upper extremity support     Plan     Restore terminal knee extension.  Right hamstring strengthening.  Lower extremity strength and proprioceptive control.   Plan of care certification 2x/week 5 weeks 3/6/23- 4/7/23    GUILLERMO BHAT  PT

## 2023-03-28 ENCOUNTER — OFFICE VISIT (OUTPATIENT)
Dept: INTERNAL MEDICINE | Facility: CLINIC | Age: 53
End: 2023-03-28
Payer: COMMERCIAL

## 2023-03-28 VITALS
OXYGEN SATURATION: 98 % | HEIGHT: 68 IN | BODY MASS INDEX: 35.25 KG/M2 | WEIGHT: 232.56 LBS | SYSTOLIC BLOOD PRESSURE: 128 MMHG | DIASTOLIC BLOOD PRESSURE: 88 MMHG | HEART RATE: 95 BPM

## 2023-03-28 DIAGNOSIS — J06.9 UPPER RESPIRATORY TRACT INFECTION, UNSPECIFIED TYPE: ICD-10-CM

## 2023-03-28 DIAGNOSIS — E66.01 SEVERE OBESITY (BMI 35.0-35.9 WITH COMORBIDITY): ICD-10-CM

## 2023-03-28 DIAGNOSIS — F10.10 ALCOHOL ABUSE: ICD-10-CM

## 2023-03-28 DIAGNOSIS — I10 ESSENTIAL HYPERTENSION: ICD-10-CM

## 2023-03-28 PROCEDURE — 3079F PR MOST RECENT DIASTOLIC BLOOD PRESSURE 80-89 MM HG: ICD-10-PCS | Mod: CPTII,S$GLB,, | Performed by: INTERNAL MEDICINE

## 2023-03-28 PROCEDURE — 99999 PR PBB SHADOW E&M-EST. PATIENT-LVL IV: ICD-10-PCS | Mod: PBBFAC,,, | Performed by: INTERNAL MEDICINE

## 2023-03-28 PROCEDURE — 3008F BODY MASS INDEX DOCD: CPT | Mod: CPTII,S$GLB,, | Performed by: INTERNAL MEDICINE

## 2023-03-28 PROCEDURE — 99213 PR OFFICE/OUTPT VISIT, EST, LEVL III, 20-29 MIN: ICD-10-PCS | Mod: S$GLB,,, | Performed by: INTERNAL MEDICINE

## 2023-03-28 PROCEDURE — 1159F MED LIST DOCD IN RCRD: CPT | Mod: CPTII,S$GLB,, | Performed by: INTERNAL MEDICINE

## 2023-03-28 PROCEDURE — 3079F DIAST BP 80-89 MM HG: CPT | Mod: CPTII,S$GLB,, | Performed by: INTERNAL MEDICINE

## 2023-03-28 PROCEDURE — 3044F PR MOST RECENT HEMOGLOBIN A1C LEVEL <7.0%: ICD-10-PCS | Mod: CPTII,S$GLB,, | Performed by: INTERNAL MEDICINE

## 2023-03-28 PROCEDURE — 3044F HG A1C LEVEL LT 7.0%: CPT | Mod: CPTII,S$GLB,, | Performed by: INTERNAL MEDICINE

## 2023-03-28 PROCEDURE — 3074F SYST BP LT 130 MM HG: CPT | Mod: CPTII,S$GLB,, | Performed by: INTERNAL MEDICINE

## 2023-03-28 PROCEDURE — 1159F PR MEDICATION LIST DOCUMENTED IN MEDICAL RECORD: ICD-10-PCS | Mod: CPTII,S$GLB,, | Performed by: INTERNAL MEDICINE

## 2023-03-28 PROCEDURE — 99999 PR PBB SHADOW E&M-EST. PATIENT-LVL IV: CPT | Mod: PBBFAC,,, | Performed by: INTERNAL MEDICINE

## 2023-03-28 PROCEDURE — 3008F PR BODY MASS INDEX (BMI) DOCUMENTED: ICD-10-PCS | Mod: CPTII,S$GLB,, | Performed by: INTERNAL MEDICINE

## 2023-03-28 PROCEDURE — 3074F PR MOST RECENT SYSTOLIC BLOOD PRESSURE < 130 MM HG: ICD-10-PCS | Mod: CPTII,S$GLB,, | Performed by: INTERNAL MEDICINE

## 2023-03-28 PROCEDURE — 1160F PR REVIEW ALL MEDS BY PRESCRIBER/CLIN PHARMACIST DOCUMENTED: ICD-10-PCS | Mod: CPTII,S$GLB,, | Performed by: INTERNAL MEDICINE

## 2023-03-28 PROCEDURE — 99213 OFFICE O/P EST LOW 20 MIN: CPT | Mod: S$GLB,,, | Performed by: INTERNAL MEDICINE

## 2023-03-28 PROCEDURE — 1160F RVW MEDS BY RX/DR IN RCRD: CPT | Mod: CPTII,S$GLB,, | Performed by: INTERNAL MEDICINE

## 2023-03-28 RX ORDER — PROMETHAZINE HYDROCHLORIDE AND CODEINE PHOSPHATE 6.25; 1 MG/5ML; MG/5ML
5 SOLUTION ORAL EVERY 8 HOURS PRN
Qty: 118 ML | Refills: 0 | Status: SHIPPED | OUTPATIENT
Start: 2023-03-28 | End: 2023-04-07

## 2023-03-28 NOTE — PROGRESS NOTES
Patient ID: Dyan Arredondo is a 52 y.o. female.    Chief Complaint: Hypertension    HPI Dyan is a 52 y.o. female with  high risk HPV and complex tear of meniscus who presents for follow up of HTN. At last visit she was started on metoprolol. She reported to my medical assistant that she drinks a lot. When I asked her about this, she says she drinks typically one bottle of wine and one martini per night. She has been drinking in this manner for approximately 20 years. She does quit drinking on occasion for a few months each time. She returns to drinking because she likes the taste and finds it to be fun. Denies any withdrawal symptoms such as tremor or cravings.   She again requests Rx for promethazine with codeine as she has been unable to find it at some local pharmacies.     Review of Systems   All other systems reviewed and are negative.      Objective:     Vitals:    03/28/23 1006   BP: 128/88   Pulse: 95        Physical Exam  Vitals reviewed.   Constitutional:       General: She is not in acute distress.     Appearance: Normal appearance. She is well-developed. She is obese. She is not ill-appearing, toxic-appearing or diaphoretic.   HENT:      Head: Normocephalic and atraumatic.      Right Ear: External ear normal.      Left Ear: External ear normal.      Nose: Nose normal.   Eyes:      General: No scleral icterus.        Right eye: No discharge.         Left eye: No discharge.      Extraocular Movements: Extraocular movements intact.      Conjunctiva/sclera: Conjunctivae normal.   Cardiovascular:      Rate and Rhythm: Normal rate and regular rhythm.      Heart sounds: Normal heart sounds. No murmur heard.    No friction rub. No gallop.   Pulmonary:      Effort: Pulmonary effort is normal. No respiratory distress.      Breath sounds: Normal breath sounds. No stridor. No wheezing, rhonchi or rales.   Skin:     General: Skin is warm and dry.   Neurological:      General: No focal deficit present.       Mental Status: She is alert and oriented to person, place, and time. Mental status is at baseline.   Psychiatric:         Mood and Affect: Mood normal.         Behavior: Behavior normal.         Thought Content: Thought content normal.         Judgment: Judgment normal.       Assessment:       1. Essential hypertension Well controlled   2. Alcohol abuse Chronic   3. Upper respiratory tract infection, unspecified type Inactive   4. Severe obesity (BMI 35.0-35.9 with comorbidity) Active       Plan:         Essential hypertension  Comments:  Continue current medication  Orders:  -     Comprehensive Metabolic Panel; Future; Expected date: 09/28/2023    Alcohol abuse  Comments:  Counseled on importance of reduction with goal of cessation in order to avoid adverse outcomes such as liver disease    Upper respiratory tract infection, unspecified type  Comments:  Pt counseled that she CANNOT use codeine along with alcohol; this could be deadly  Orders:  -     promethazine-codeine 6.25-10 mg/5 ml (PHENERGAN WITH CODEINE) 6.25-10 mg/5 mL syrup; Take 5 mLs by mouth every 8 (eight) hours as needed for Cough.  Dispense: 118 mL; Refill: 0    Severe obesity (BMI 35.0-35.9 with comorbidity)  Comments:  Pt advised that alcohol reduction and cessation will likely result in some weight loss         RTC July-Sept f/u HTN     Warning signs discussed, patient to call with any further issues or worsening of symptoms.       Parts of the above note were dictated using a voice dictation software. Please excuse any grammatical or typographical errors.

## 2023-04-04 ENCOUNTER — OFFICE VISIT (OUTPATIENT)
Dept: ORTHOPEDICS | Facility: CLINIC | Age: 53
End: 2023-04-04
Payer: COMMERCIAL

## 2023-04-04 VITALS
WEIGHT: 233.44 LBS | SYSTOLIC BLOOD PRESSURE: 125 MMHG | DIASTOLIC BLOOD PRESSURE: 85 MMHG | HEART RATE: 106 BPM | HEIGHT: 68 IN | BODY MASS INDEX: 35.38 KG/M2

## 2023-04-04 DIAGNOSIS — M17.11 PRIMARY OSTEOARTHRITIS OF RIGHT KNEE: Primary | ICD-10-CM

## 2023-04-04 DIAGNOSIS — S83.271A COMPLEX TEAR OF LAT MENSC, CURRENT INJURY, RIGHT KNEE, INIT: ICD-10-CM

## 2023-04-04 PROCEDURE — 3079F DIAST BP 80-89 MM HG: CPT | Mod: CPTII,S$GLB,, | Performed by: ORTHOPAEDIC SURGERY

## 2023-04-04 PROCEDURE — 99999 PR PBB SHADOW E&M-EST. PATIENT-LVL III: CPT | Mod: PBBFAC,,, | Performed by: ORTHOPAEDIC SURGERY

## 2023-04-04 PROCEDURE — 3008F PR BODY MASS INDEX (BMI) DOCUMENTED: ICD-10-PCS | Mod: CPTII,S$GLB,, | Performed by: ORTHOPAEDIC SURGERY

## 2023-04-04 PROCEDURE — 1160F RVW MEDS BY RX/DR IN RCRD: CPT | Mod: CPTII,S$GLB,, | Performed by: ORTHOPAEDIC SURGERY

## 2023-04-04 PROCEDURE — 3044F HG A1C LEVEL LT 7.0%: CPT | Mod: CPTII,S$GLB,, | Performed by: ORTHOPAEDIC SURGERY

## 2023-04-04 PROCEDURE — 3044F PR MOST RECENT HEMOGLOBIN A1C LEVEL <7.0%: ICD-10-PCS | Mod: CPTII,S$GLB,, | Performed by: ORTHOPAEDIC SURGERY

## 2023-04-04 PROCEDURE — 99499 UNLISTED E&M SERVICE: CPT | Mod: S$GLB,,, | Performed by: ORTHOPAEDIC SURGERY

## 2023-04-04 PROCEDURE — 99499 NO LOS: ICD-10-PCS | Mod: S$GLB,,, | Performed by: ORTHOPAEDIC SURGERY

## 2023-04-04 PROCEDURE — 3008F BODY MASS INDEX DOCD: CPT | Mod: CPTII,S$GLB,, | Performed by: ORTHOPAEDIC SURGERY

## 2023-04-04 PROCEDURE — 1160F PR REVIEW ALL MEDS BY PRESCRIBER/CLIN PHARMACIST DOCUMENTED: ICD-10-PCS | Mod: CPTII,S$GLB,, | Performed by: ORTHOPAEDIC SURGERY

## 2023-04-04 PROCEDURE — 1159F MED LIST DOCD IN RCRD: CPT | Mod: CPTII,S$GLB,, | Performed by: ORTHOPAEDIC SURGERY

## 2023-04-04 PROCEDURE — 99999 PR PBB SHADOW E&M-EST. PATIENT-LVL III: ICD-10-PCS | Mod: PBBFAC,,, | Performed by: ORTHOPAEDIC SURGERY

## 2023-04-04 PROCEDURE — 1159F PR MEDICATION LIST DOCUMENTED IN MEDICAL RECORD: ICD-10-PCS | Mod: CPTII,S$GLB,, | Performed by: ORTHOPAEDIC SURGERY

## 2023-04-04 PROCEDURE — 20610 DRAIN/INJ JOINT/BURSA W/O US: CPT | Mod: RT,S$GLB,, | Performed by: ORTHOPAEDIC SURGERY

## 2023-04-04 PROCEDURE — 3079F PR MOST RECENT DIASTOLIC BLOOD PRESSURE 80-89 MM HG: ICD-10-PCS | Mod: CPTII,S$GLB,, | Performed by: ORTHOPAEDIC SURGERY

## 2023-04-04 PROCEDURE — 3074F SYST BP LT 130 MM HG: CPT | Mod: CPTII,S$GLB,, | Performed by: ORTHOPAEDIC SURGERY

## 2023-04-04 PROCEDURE — 20610 LARGE JOINT ASPIRATION/INJECTION: R KNEE: ICD-10-PCS | Mod: RT,S$GLB,, | Performed by: ORTHOPAEDIC SURGERY

## 2023-04-04 PROCEDURE — 3074F PR MOST RECENT SYSTOLIC BLOOD PRESSURE < 130 MM HG: ICD-10-PCS | Mod: CPTII,S$GLB,, | Performed by: ORTHOPAEDIC SURGERY

## 2023-04-04 NOTE — PROCEDURES
Large Joint Aspiration/Injection: R knee    Date/Time: 4/4/2023 9:30 AM  Performed by: Amilcar Ruiz MD  Authorized by: Amilcar Ruiz MD     Consent Done?:  Yes (Verbal)  Indications:  Arthritis  Site marked: the procedure site was marked    Timeout: prior to procedure the correct patient, procedure, and site was verified    Prep: patient was prepped and draped in usual sterile fashion      Local anesthesia used?: Yes    Anesthesia:  Local infiltration  Local anesthetic:  Topical anesthetic and lidocaine 1% without epinephrine  Anesthetic total (ml):  8      Details:  Needle Size:  22 G  Ultrasonic Guidance for needle placement?: No    Approach:  Lateral  Location:  Knee  Site:  R knee  Medications:  20 mg sodium hyaluronate (EUFLEXXA) 10 mg/mL(mw 2.4 -3.6 million)  Aspirate amount (mL):  4  Patient tolerance:  Patient tolerated the procedure well with no immediate complications

## 2023-04-05 ENCOUNTER — DOCUMENTATION ONLY (OUTPATIENT)
Dept: REHABILITATION | Facility: HOSPITAL | Age: 53
End: 2023-04-05
Payer: COMMERCIAL

## 2023-04-11 ENCOUNTER — OFFICE VISIT (OUTPATIENT)
Dept: ORTHOPEDICS | Facility: CLINIC | Age: 53
End: 2023-04-11
Payer: COMMERCIAL

## 2023-04-11 VITALS
DIASTOLIC BLOOD PRESSURE: 85 MMHG | WEIGHT: 233.44 LBS | BODY MASS INDEX: 35.38 KG/M2 | HEIGHT: 68 IN | HEART RATE: 106 BPM | SYSTOLIC BLOOD PRESSURE: 125 MMHG

## 2023-04-11 DIAGNOSIS — M17.11 PRIMARY OSTEOARTHRITIS OF RIGHT KNEE: Primary | ICD-10-CM

## 2023-04-11 PROCEDURE — 1159F MED LIST DOCD IN RCRD: CPT | Mod: CPTII,S$GLB,, | Performed by: ORTHOPAEDIC SURGERY

## 2023-04-11 PROCEDURE — 3079F DIAST BP 80-89 MM HG: CPT | Mod: CPTII,S$GLB,, | Performed by: ORTHOPAEDIC SURGERY

## 2023-04-11 PROCEDURE — 99999 PR PBB SHADOW E&M-EST. PATIENT-LVL III: CPT | Mod: PBBFAC,,, | Performed by: ORTHOPAEDIC SURGERY

## 2023-04-11 PROCEDURE — 1160F RVW MEDS BY RX/DR IN RCRD: CPT | Mod: CPTII,S$GLB,, | Performed by: ORTHOPAEDIC SURGERY

## 2023-04-11 PROCEDURE — 3074F SYST BP LT 130 MM HG: CPT | Mod: CPTII,S$GLB,, | Performed by: ORTHOPAEDIC SURGERY

## 2023-04-11 PROCEDURE — 3044F PR MOST RECENT HEMOGLOBIN A1C LEVEL <7.0%: ICD-10-PCS | Mod: CPTII,S$GLB,, | Performed by: ORTHOPAEDIC SURGERY

## 2023-04-11 PROCEDURE — 3008F PR BODY MASS INDEX (BMI) DOCUMENTED: ICD-10-PCS | Mod: CPTII,S$GLB,, | Performed by: ORTHOPAEDIC SURGERY

## 2023-04-11 PROCEDURE — 3074F PR MOST RECENT SYSTOLIC BLOOD PRESSURE < 130 MM HG: ICD-10-PCS | Mod: CPTII,S$GLB,, | Performed by: ORTHOPAEDIC SURGERY

## 2023-04-11 PROCEDURE — 99999 PR PBB SHADOW E&M-EST. PATIENT-LVL III: ICD-10-PCS | Mod: PBBFAC,,, | Performed by: ORTHOPAEDIC SURGERY

## 2023-04-11 PROCEDURE — 3079F PR MOST RECENT DIASTOLIC BLOOD PRESSURE 80-89 MM HG: ICD-10-PCS | Mod: CPTII,S$GLB,, | Performed by: ORTHOPAEDIC SURGERY

## 2023-04-11 PROCEDURE — 3044F HG A1C LEVEL LT 7.0%: CPT | Mod: CPTII,S$GLB,, | Performed by: ORTHOPAEDIC SURGERY

## 2023-04-11 PROCEDURE — 1159F PR MEDICATION LIST DOCUMENTED IN MEDICAL RECORD: ICD-10-PCS | Mod: CPTII,S$GLB,, | Performed by: ORTHOPAEDIC SURGERY

## 2023-04-11 PROCEDURE — 1160F PR REVIEW ALL MEDS BY PRESCRIBER/CLIN PHARMACIST DOCUMENTED: ICD-10-PCS | Mod: CPTII,S$GLB,, | Performed by: ORTHOPAEDIC SURGERY

## 2023-04-11 PROCEDURE — 99499 UNLISTED E&M SERVICE: CPT | Mod: S$GLB,,, | Performed by: ORTHOPAEDIC SURGERY

## 2023-04-11 PROCEDURE — 3008F BODY MASS INDEX DOCD: CPT | Mod: CPTII,S$GLB,, | Performed by: ORTHOPAEDIC SURGERY

## 2023-04-11 PROCEDURE — 99499 NO LOS: ICD-10-PCS | Mod: S$GLB,,, | Performed by: ORTHOPAEDIC SURGERY

## 2023-04-11 PROCEDURE — 20610 DRAIN/INJ JOINT/BURSA W/O US: CPT | Mod: RT,S$GLB,, | Performed by: ORTHOPAEDIC SURGERY

## 2023-04-11 PROCEDURE — 20610 LARGE JOINT ASPIRATION/INJECTION: R KNEE: ICD-10-PCS | Mod: RT,S$GLB,, | Performed by: ORTHOPAEDIC SURGERY

## 2023-04-11 NOTE — PROCEDURES
Large Joint Aspiration/Injection: R knee    Date/Time: 4/11/2023 1:45 PM  Performed by: Amilcar Ruiz MD  Authorized by: Amilcar Ruiz MD     Consent Done?:  Yes (Verbal)  Indications:  Arthritis  Site marked: the procedure site was marked    Timeout: prior to procedure the correct patient, procedure, and site was verified    Prep: patient was prepped and draped in usual sterile fashion      Local anesthesia used?: Yes    Local anesthetic:  Topical anesthetic    Details:  Needle Size:  22 G  Ultrasonic Guidance for needle placement?: No    Approach:  Lateral  Location:  Knee  Site:  R knee  Medications:  20 mg sodium hyaluronate (EUFLEXXA) 10 mg/mL(mw 2.4 -3.6 million)  Patient tolerance:  Patient tolerated the procedure well with no immediate complications

## 2023-04-18 ENCOUNTER — OFFICE VISIT (OUTPATIENT)
Dept: ORTHOPEDICS | Facility: CLINIC | Age: 53
End: 2023-04-18
Payer: COMMERCIAL

## 2023-04-18 VITALS
BODY MASS INDEX: 35.38 KG/M2 | HEART RATE: 106 BPM | DIASTOLIC BLOOD PRESSURE: 85 MMHG | SYSTOLIC BLOOD PRESSURE: 125 MMHG | HEIGHT: 68 IN | WEIGHT: 233.44 LBS

## 2023-04-18 DIAGNOSIS — M17.11 PRIMARY OSTEOARTHRITIS OF RIGHT KNEE: Primary | ICD-10-CM

## 2023-04-18 PROCEDURE — 99499 NO LOS: ICD-10-PCS | Mod: S$GLB,,, | Performed by: ORTHOPAEDIC SURGERY

## 2023-04-18 PROCEDURE — 20610 DRAIN/INJ JOINT/BURSA W/O US: CPT | Mod: RT,S$GLB,, | Performed by: ORTHOPAEDIC SURGERY

## 2023-04-18 PROCEDURE — 1160F PR REVIEW ALL MEDS BY PRESCRIBER/CLIN PHARMACIST DOCUMENTED: ICD-10-PCS | Mod: CPTII,S$GLB,, | Performed by: ORTHOPAEDIC SURGERY

## 2023-04-18 PROCEDURE — 3079F PR MOST RECENT DIASTOLIC BLOOD PRESSURE 80-89 MM HG: ICD-10-PCS | Mod: CPTII,S$GLB,, | Performed by: ORTHOPAEDIC SURGERY

## 2023-04-18 PROCEDURE — 3074F PR MOST RECENT SYSTOLIC BLOOD PRESSURE < 130 MM HG: ICD-10-PCS | Mod: CPTII,S$GLB,, | Performed by: ORTHOPAEDIC SURGERY

## 2023-04-18 PROCEDURE — 20610 LARGE JOINT ASPIRATION/INJECTION: R KNEE: ICD-10-PCS | Mod: RT,S$GLB,, | Performed by: ORTHOPAEDIC SURGERY

## 2023-04-18 PROCEDURE — 3079F DIAST BP 80-89 MM HG: CPT | Mod: CPTII,S$GLB,, | Performed by: ORTHOPAEDIC SURGERY

## 2023-04-18 PROCEDURE — 99999 PR PBB SHADOW E&M-EST. PATIENT-LVL III: ICD-10-PCS | Mod: PBBFAC,,, | Performed by: ORTHOPAEDIC SURGERY

## 2023-04-18 PROCEDURE — 3044F PR MOST RECENT HEMOGLOBIN A1C LEVEL <7.0%: ICD-10-PCS | Mod: CPTII,S$GLB,, | Performed by: ORTHOPAEDIC SURGERY

## 2023-04-18 PROCEDURE — 1159F MED LIST DOCD IN RCRD: CPT | Mod: CPTII,S$GLB,, | Performed by: ORTHOPAEDIC SURGERY

## 2023-04-18 PROCEDURE — 99499 UNLISTED E&M SERVICE: CPT | Mod: S$GLB,,, | Performed by: ORTHOPAEDIC SURGERY

## 2023-04-18 PROCEDURE — 99999 PR PBB SHADOW E&M-EST. PATIENT-LVL III: CPT | Mod: PBBFAC,,, | Performed by: ORTHOPAEDIC SURGERY

## 2023-04-18 PROCEDURE — 3008F PR BODY MASS INDEX (BMI) DOCUMENTED: ICD-10-PCS | Mod: CPTII,S$GLB,, | Performed by: ORTHOPAEDIC SURGERY

## 2023-04-18 PROCEDURE — 3044F HG A1C LEVEL LT 7.0%: CPT | Mod: CPTII,S$GLB,, | Performed by: ORTHOPAEDIC SURGERY

## 2023-04-18 PROCEDURE — 1160F RVW MEDS BY RX/DR IN RCRD: CPT | Mod: CPTII,S$GLB,, | Performed by: ORTHOPAEDIC SURGERY

## 2023-04-18 PROCEDURE — 1159F PR MEDICATION LIST DOCUMENTED IN MEDICAL RECORD: ICD-10-PCS | Mod: CPTII,S$GLB,, | Performed by: ORTHOPAEDIC SURGERY

## 2023-04-18 PROCEDURE — 3008F BODY MASS INDEX DOCD: CPT | Mod: CPTII,S$GLB,, | Performed by: ORTHOPAEDIC SURGERY

## 2023-04-18 PROCEDURE — 3074F SYST BP LT 130 MM HG: CPT | Mod: CPTII,S$GLB,, | Performed by: ORTHOPAEDIC SURGERY

## 2023-04-18 NOTE — PROCEDURES
Large Joint Aspiration/Injection: R knee    Date/Time: 4/18/2023 2:30 PM  Performed by: Amilcar Ruiz MD  Authorized by: Amilcar Ruiz MD     Consent Done?:  Yes (Verbal)  Indications:  Arthritis  Site marked: the procedure site was marked    Timeout: prior to procedure the correct patient, procedure, and site was verified    Prep: patient was prepped and draped in usual sterile fashion      Local anesthesia used?: Yes    Local anesthetic:  Topical anesthetic    Details:  Needle Size:  22 G  Ultrasonic Guidance for needle placement?: No    Approach:  Anterolateral  Location:  Knee  Site:  R knee  Medications:  20 mg sodium hyaluronate (EUFLEXXA) 10 mg/mL(mw 2.4 -3.6 million)  Patient tolerance:  Patient tolerated the procedure well with no immediate complications     #3

## 2023-04-26 ENCOUNTER — PATIENT MESSAGE (OUTPATIENT)
Dept: GASTROENTEROLOGY | Facility: CLINIC | Age: 53
End: 2023-04-26
Payer: COMMERCIAL

## 2023-04-26 ENCOUNTER — TELEPHONE (OUTPATIENT)
Dept: GASTROENTEROLOGY | Facility: CLINIC | Age: 53
End: 2023-04-26
Payer: COMMERCIAL

## 2023-04-26 NOTE — TELEPHONE ENCOUNTER
Endoscopy Scheduling Questionnaire:     Are you taking any blood thinners? No     Medication:   Ordering physician:   Message sent to ordering physician: N/A               If Yes, have you been on them for longer than one year?  N/A    2. Have you been diagnosed with Diverticulitis in past three months?   No    3. Are you on dialysis or have Kidney Disease?  No    4. Previous Colonoscopy?   No         If yes Do you have a history of colon polyps?   N/A     5. Are you a diabetic?   No    6. Do you have a history of constipation?   No  Medication:   Ordering physician:       Procedure scheduled with Dr. Abhi Rodriguez MD  on Wednesday, May 17, 2023    The prep being used is GOLYTELY/ COLYTE/ NULYTELY      The patient's prep instructions were sent via portal message on 04/26/2023.

## 2023-05-11 ENCOUNTER — TELEPHONE (OUTPATIENT)
Dept: GASTROENTEROLOGY | Facility: CLINIC | Age: 53
End: 2023-05-11
Payer: COMMERCIAL

## 2023-05-11 NOTE — TELEPHONE ENCOUNTER
----- Message from Kerrie Nicole sent at 5/11/2023  3:36 PM CDT -----  Regarding: Patient Call Back  .Type: Patient Call Back    Who called: patient - via live chat     What is the request in detail:patient requested we cancel upcoming Colonoscopy on 5/17 please     Can the clinic reply by [facility]? Yes     Would the patient rather a call back or a response via [facility]? Either     Best call back number: .[phone]

## 2023-05-13 NOTE — PATIENT INSTRUCTIONS
Recommend CREST pad for left fifth toe. If  Does not help then would release the contracted tendon on bottom of toe in office.    Discussed steroid injections for the fatty masses on both feet.       Sandra Morales

## 2023-05-16 DIAGNOSIS — B00.1 COLD SORE: ICD-10-CM

## 2023-05-16 DIAGNOSIS — I10 ESSENTIAL HYPERTENSION: ICD-10-CM

## 2023-05-16 RX ORDER — METOPROLOL SUCCINATE 25 MG/1
TABLET, EXTENDED RELEASE ORAL
Qty: 90 TABLET | Refills: 3 | Status: SHIPPED | OUTPATIENT
Start: 2023-05-16

## 2023-05-16 RX ORDER — ACYCLOVIR 400 MG/1
TABLET ORAL
Qty: 180 TABLET | Refills: 1 | Status: SHIPPED | OUTPATIENT
Start: 2023-05-16 | End: 2023-07-26 | Stop reason: SDUPTHER

## 2023-05-16 NOTE — TELEPHONE ENCOUNTER
Refill Decision Note   Dyan Arredondo  is requesting a refill authorization.  Brief Assessment and Rationale for Refill:  Approve     Medication Therapy Plan:         Comments:     No Care Gaps recommended.     Note composed:9:29 AM 05/16/2023

## 2023-05-16 NOTE — TELEPHONE ENCOUNTER
No care due was identified.  [facility] Embedded Care Due Messages. Reference number: [id number].   5/16/2023 4:38:59 AM CDT

## 2023-07-20 ENCOUNTER — LAB VISIT (OUTPATIENT)
Dept: LAB | Facility: HOSPITAL | Age: 53
End: 2023-07-20
Attending: INTERNAL MEDICINE
Payer: COMMERCIAL

## 2023-07-20 DIAGNOSIS — I10 ESSENTIAL HYPERTENSION: ICD-10-CM

## 2023-07-20 LAB
ALBUMIN SERPL BCP-MCNC: 4 G/DL (ref 3.5–5.2)
ALP SERPL-CCNC: 61 U/L (ref 55–135)
ALT SERPL W/O P-5'-P-CCNC: 10 U/L (ref 10–44)
ANION GAP SERPL CALC-SCNC: 12 MMOL/L (ref 8–16)
AST SERPL-CCNC: 16 U/L (ref 10–40)
BILIRUB SERPL-MCNC: 0.3 MG/DL (ref 0.1–1)
BUN SERPL-MCNC: 6 MG/DL (ref 6–20)
CALCIUM SERPL-MCNC: 9.2 MG/DL (ref 8.7–10.5)
CHLORIDE SERPL-SCNC: 104 MMOL/L (ref 95–110)
CO2 SERPL-SCNC: 24 MMOL/L (ref 23–29)
CREAT SERPL-MCNC: 0.8 MG/DL (ref 0.5–1.4)
EST. GFR  (NO RACE VARIABLE): >60 ML/MIN/1.73 M^2
GLUCOSE SERPL-MCNC: 87 MG/DL (ref 70–110)
POTASSIUM SERPL-SCNC: 4.3 MMOL/L (ref 3.5–5.1)
PROT SERPL-MCNC: 7.7 G/DL (ref 6–8.4)
SODIUM SERPL-SCNC: 140 MMOL/L (ref 136–145)

## 2023-07-20 PROCEDURE — 80053 COMPREHEN METABOLIC PANEL: CPT | Performed by: INTERNAL MEDICINE

## 2023-07-20 PROCEDURE — 36415 COLL VENOUS BLD VENIPUNCTURE: CPT | Mod: PO | Performed by: INTERNAL MEDICINE

## 2023-07-26 ENCOUNTER — OFFICE VISIT (OUTPATIENT)
Dept: INTERNAL MEDICINE | Facility: CLINIC | Age: 53
End: 2023-07-26
Payer: COMMERCIAL

## 2023-07-26 VITALS
BODY MASS INDEX: 35.81 KG/M2 | HEIGHT: 68 IN | OXYGEN SATURATION: 98 % | HEART RATE: 94 BPM | DIASTOLIC BLOOD PRESSURE: 82 MMHG | WEIGHT: 236.31 LBS | SYSTOLIC BLOOD PRESSURE: 116 MMHG

## 2023-07-26 DIAGNOSIS — B00.1 COLD SORE: ICD-10-CM

## 2023-07-26 DIAGNOSIS — I10 ESSENTIAL HYPERTENSION: ICD-10-CM

## 2023-07-26 DIAGNOSIS — Z00.00 ANNUAL PHYSICAL EXAM: ICD-10-CM

## 2023-07-26 DIAGNOSIS — M17.11 PRIMARY OSTEOARTHRITIS OF RIGHT KNEE: ICD-10-CM

## 2023-07-26 DIAGNOSIS — Z12.11 ENCOUNTER FOR SCREENING COLONOSCOPY: ICD-10-CM

## 2023-07-26 DIAGNOSIS — L82.1 SEBORRHEIC KERATOSIS: ICD-10-CM

## 2023-07-26 PROCEDURE — 3074F SYST BP LT 130 MM HG: CPT | Mod: CPTII,S$GLB,, | Performed by: INTERNAL MEDICINE

## 2023-07-26 PROCEDURE — 3008F PR BODY MASS INDEX (BMI) DOCUMENTED: ICD-10-PCS | Mod: CPTII,S$GLB,, | Performed by: INTERNAL MEDICINE

## 2023-07-26 PROCEDURE — 1159F MED LIST DOCD IN RCRD: CPT | Mod: CPTII,S$GLB,, | Performed by: INTERNAL MEDICINE

## 2023-07-26 PROCEDURE — 3074F PR MOST RECENT SYSTOLIC BLOOD PRESSURE < 130 MM HG: ICD-10-PCS | Mod: CPTII,S$GLB,, | Performed by: INTERNAL MEDICINE

## 2023-07-26 PROCEDURE — 3044F HG A1C LEVEL LT 7.0%: CPT | Mod: CPTII,S$GLB,, | Performed by: INTERNAL MEDICINE

## 2023-07-26 PROCEDURE — 1159F PR MEDICATION LIST DOCUMENTED IN MEDICAL RECORD: ICD-10-PCS | Mod: CPTII,S$GLB,, | Performed by: INTERNAL MEDICINE

## 2023-07-26 PROCEDURE — 1160F RVW MEDS BY RX/DR IN RCRD: CPT | Mod: CPTII,S$GLB,, | Performed by: INTERNAL MEDICINE

## 2023-07-26 PROCEDURE — 99214 OFFICE O/P EST MOD 30 MIN: CPT | Mod: S$GLB,,, | Performed by: INTERNAL MEDICINE

## 2023-07-26 PROCEDURE — 3079F PR MOST RECENT DIASTOLIC BLOOD PRESSURE 80-89 MM HG: ICD-10-PCS | Mod: CPTII,S$GLB,, | Performed by: INTERNAL MEDICINE

## 2023-07-26 PROCEDURE — 3008F BODY MASS INDEX DOCD: CPT | Mod: CPTII,S$GLB,, | Performed by: INTERNAL MEDICINE

## 2023-07-26 PROCEDURE — 99999 PR PBB SHADOW E&M-EST. PATIENT-LVL IV: CPT | Mod: PBBFAC,,, | Performed by: INTERNAL MEDICINE

## 2023-07-26 PROCEDURE — 3079F DIAST BP 80-89 MM HG: CPT | Mod: CPTII,S$GLB,, | Performed by: INTERNAL MEDICINE

## 2023-07-26 PROCEDURE — 1160F PR REVIEW ALL MEDS BY PRESCRIBER/CLIN PHARMACIST DOCUMENTED: ICD-10-PCS | Mod: CPTII,S$GLB,, | Performed by: INTERNAL MEDICINE

## 2023-07-26 PROCEDURE — 99214 PR OFFICE/OUTPT VISIT, EST, LEVL IV, 30-39 MIN: ICD-10-PCS | Mod: S$GLB,,, | Performed by: INTERNAL MEDICINE

## 2023-07-26 PROCEDURE — 3044F PR MOST RECENT HEMOGLOBIN A1C LEVEL <7.0%: ICD-10-PCS | Mod: CPTII,S$GLB,, | Performed by: INTERNAL MEDICINE

## 2023-07-26 PROCEDURE — 99999 PR PBB SHADOW E&M-EST. PATIENT-LVL IV: ICD-10-PCS | Mod: PBBFAC,,, | Performed by: INTERNAL MEDICINE

## 2023-07-26 RX ORDER — ACYCLOVIR 400 MG/1
TABLET ORAL
Qty: 30 TABLET | Refills: 2 | Status: SHIPPED | OUTPATIENT
Start: 2023-07-26 | End: 2024-02-20 | Stop reason: SDUPTHER

## 2023-07-26 NOTE — PROGRESS NOTES
Patient ID: Dyan Arredondo is a 52 y.o. female.    Chief Complaint: Hypertension    HPI Dyan is a 52 y.o. female with HTN, high risk HPV and complex tear of meniscus who presents for follow up of hypertension. Has knee pain but plans to return to Orthopedics for this. Has skin lesion on shoulder and she plans to see Dermatology about this. No further acute complaints. Reviewed CMP results from 7/20/23. She had to cancel her colonoscopy; needs another order.     Health Maintenance Topics with due status: Not Due       Topic Last Completion Date    TETANUS VACCINE 04/04/2018    Cervical Cancer Screening 12/18/2020    Mammogram 12/12/2022    Influenza Vaccine 01/31/2023    Hemoglobin A1c (Diabetic Prevention Screening) 01/31/2023    Lipid Panel 01/31/2023        Review of Systems   Musculoskeletal:  Positive for arthralgias.   Skin:         See HPI   All other systems reviewed and are negative.      Objective:     Vitals:    07/26/23 1044   BP: 116/82   Pulse: 94        Physical Exam  Vitals reviewed.   Constitutional:       General: She is not in acute distress.     Appearance: Normal appearance. She is well-developed. She is not ill-appearing, toxic-appearing or diaphoretic.   HENT:      Head: Normocephalic and atraumatic.      Right Ear: External ear normal.      Left Ear: External ear normal.      Nose: Nose normal. No nasal deformity, signs of injury, laceration, mucosal edema, congestion or rhinorrhea.      Right Turbinates: Not enlarged.      Left Turbinates: Not enlarged.   Eyes:      General: No scleral icterus.        Right eye: No discharge.         Left eye: No discharge.      Extraocular Movements: Extraocular movements intact.      Conjunctiva/sclera: Conjunctivae normal.   Cardiovascular:      Rate and Rhythm: Normal rate and regular rhythm.      Heart sounds: Normal heart sounds. No murmur heard.    No friction rub. No gallop.   Pulmonary:      Effort: Pulmonary effort is normal. No respiratory  distress.      Breath sounds: Normal breath sounds. No stridor. No wheezing, rhonchi or rales.   Skin:     General: Skin is warm and dry.   Neurological:      General: No focal deficit present.      Mental Status: She is alert and oriented to person, place, and time. Mental status is at baseline.   Psychiatric:         Mood and Affect: Mood normal.         Behavior: Behavior normal.         Thought Content: Thought content normal.         Judgment: Judgment normal.       Assessment:       1. Essential hypertension Well controlled   2. Cold sore Chronic   3. Primary osteoarthritis of right knee Chronic   4. Seborrheic keratosis Chronic   5. Encounter for screening colonoscopy    6. Annual physical exam        Plan:         Essential hypertension  Comments:  Continue current medication    Cold sore  Comments:  Continue current medication  Orders:  -     acyclovir (ZOVIRAX) 400 MG tablet; TAKE 1 TABLET BY MOUTH TWICE A DAY START AT ONSET OF SYMPTOMS. TAKE FOR 5 DAYS TOTAL  Dispense: 30 tablet; Refill: 2    Primary osteoarthritis of right knee  Comments:  Pt plans to return to Orthopedics for this.     Seborrheic keratosis  Comments:  Pt plans to follow up with dermatology for this    Encounter for screening colonoscopy  -     Ambulatory referral/consult to Endo Procedure ; Future; Expected date: 07/27/2023    Annual physical exam  -     CBC Auto Differential; Future; Expected date: 01/01/2024  -     Comprehensive Metabolic Panel; Future; Expected date: 01/01/2024  -     Hemoglobin A1C; Future; Expected date: 01/01/2024  -     Lipid Panel; Future; Expected date: 01/01/2024  -     TSH; Future; Expected date: 01/01/2024        RTC 6 months for annual exam    Warning signs discussed, patient to call with any further issues or worsening of symptoms.       Parts of the above note were dictated using a voice dictation software. Please excuse any grammatical or typographical errors.

## 2023-07-27 ENCOUNTER — TELEPHONE (OUTPATIENT)
Dept: INTERNAL MEDICINE | Facility: CLINIC | Age: 53
End: 2023-07-27
Payer: COMMERCIAL

## 2023-08-02 ENCOUNTER — TELEPHONE (OUTPATIENT)
Dept: INTERNAL MEDICINE | Facility: CLINIC | Age: 53
End: 2023-08-02
Payer: COMMERCIAL

## 2023-12-02 ENCOUNTER — NURSE TRIAGE (OUTPATIENT)
Dept: ADMINISTRATIVE | Facility: CLINIC | Age: 53
End: 2023-12-02
Payer: COMMERCIAL

## 2023-12-02 ENCOUNTER — OFFICE VISIT (OUTPATIENT)
Dept: FAMILY MEDICINE | Facility: CLINIC | Age: 53
End: 2023-12-02
Attending: FAMILY MEDICINE
Payer: COMMERCIAL

## 2023-12-02 VITALS
BODY MASS INDEX: 35.42 KG/M2 | DIASTOLIC BLOOD PRESSURE: 84 MMHG | SYSTOLIC BLOOD PRESSURE: 130 MMHG | HEIGHT: 68 IN | HEART RATE: 117 BPM | WEIGHT: 233.69 LBS | OXYGEN SATURATION: 98 %

## 2023-12-02 DIAGNOSIS — J03.80 ACUTE BACTERIAL TONSILLITIS: Primary | ICD-10-CM

## 2023-12-02 DIAGNOSIS — J02.9 SORE THROAT: ICD-10-CM

## 2023-12-02 DIAGNOSIS — R05.9 COUGH, UNSPECIFIED TYPE: ICD-10-CM

## 2023-12-02 DIAGNOSIS — B96.89 ACUTE BACTERIAL TONSILLITIS: Primary | ICD-10-CM

## 2023-12-02 PROCEDURE — 3044F PR MOST RECENT HEMOGLOBIN A1C LEVEL <7.0%: ICD-10-PCS | Mod: CPTII,S$GLB,, | Performed by: FAMILY MEDICINE

## 2023-12-02 PROCEDURE — 99999 PR PBB SHADOW E&M-EST. PATIENT-LVL III: ICD-10-PCS | Mod: PBBFAC,,, | Performed by: FAMILY MEDICINE

## 2023-12-02 PROCEDURE — 99214 OFFICE O/P EST MOD 30 MIN: CPT | Mod: S$GLB,,, | Performed by: FAMILY MEDICINE

## 2023-12-02 PROCEDURE — 1160F RVW MEDS BY RX/DR IN RCRD: CPT | Mod: CPTII,S$GLB,, | Performed by: FAMILY MEDICINE

## 2023-12-02 PROCEDURE — 3008F PR BODY MASS INDEX (BMI) DOCUMENTED: ICD-10-PCS | Mod: CPTII,S$GLB,, | Performed by: FAMILY MEDICINE

## 2023-12-02 PROCEDURE — 3075F SYST BP GE 130 - 139MM HG: CPT | Mod: CPTII,S$GLB,, | Performed by: FAMILY MEDICINE

## 2023-12-02 PROCEDURE — 3044F HG A1C LEVEL LT 7.0%: CPT | Mod: CPTII,S$GLB,, | Performed by: FAMILY MEDICINE

## 2023-12-02 PROCEDURE — 3008F BODY MASS INDEX DOCD: CPT | Mod: CPTII,S$GLB,, | Performed by: FAMILY MEDICINE

## 2023-12-02 PROCEDURE — 3079F PR MOST RECENT DIASTOLIC BLOOD PRESSURE 80-89 MM HG: ICD-10-PCS | Mod: CPTII,S$GLB,, | Performed by: FAMILY MEDICINE

## 2023-12-02 PROCEDURE — 99214 PR OFFICE/OUTPT VISIT, EST, LEVL IV, 30-39 MIN: ICD-10-PCS | Mod: S$GLB,,, | Performed by: FAMILY MEDICINE

## 2023-12-02 PROCEDURE — 1160F PR REVIEW ALL MEDS BY PRESCRIBER/CLIN PHARMACIST DOCUMENTED: ICD-10-PCS | Mod: CPTII,S$GLB,, | Performed by: FAMILY MEDICINE

## 2023-12-02 PROCEDURE — 1159F PR MEDICATION LIST DOCUMENTED IN MEDICAL RECORD: ICD-10-PCS | Mod: CPTII,S$GLB,, | Performed by: FAMILY MEDICINE

## 2023-12-02 PROCEDURE — 99999 PR PBB SHADOW E&M-EST. PATIENT-LVL III: CPT | Mod: PBBFAC,,, | Performed by: FAMILY MEDICINE

## 2023-12-02 PROCEDURE — 3079F DIAST BP 80-89 MM HG: CPT | Mod: CPTII,S$GLB,, | Performed by: FAMILY MEDICINE

## 2023-12-02 PROCEDURE — 3075F PR MOST RECENT SYSTOLIC BLOOD PRESS GE 130-139MM HG: ICD-10-PCS | Mod: CPTII,S$GLB,, | Performed by: FAMILY MEDICINE

## 2023-12-02 PROCEDURE — 1159F MED LIST DOCD IN RCRD: CPT | Mod: CPTII,S$GLB,, | Performed by: FAMILY MEDICINE

## 2023-12-02 RX ORDER — CEPHALEXIN 500 MG/1
500 CAPSULE ORAL EVERY 8 HOURS
Qty: 30 CAPSULE | Refills: 0 | Status: SHIPPED | OUTPATIENT
Start: 2023-12-02 | End: 2023-12-02 | Stop reason: SDUPTHER

## 2023-12-02 RX ORDER — PROMETHAZINE HYDROCHLORIDE AND DEXTROMETHORPHAN HYDROBROMIDE 6.25; 15 MG/5ML; MG/5ML
5 SYRUP ORAL EVERY 6 HOURS PRN
Qty: 180 ML | Refills: 0 | Status: SHIPPED | OUTPATIENT
Start: 2023-12-02 | End: 2024-01-01 | Stop reason: SDUPTHER

## 2023-12-02 RX ORDER — CEPHALEXIN 500 MG/1
500 CAPSULE ORAL EVERY 8 HOURS
Qty: 30 CAPSULE | Refills: 0 | Status: SHIPPED | OUTPATIENT
Start: 2023-12-02 | End: 2024-02-20

## 2023-12-02 RX ORDER — PROMETHAZINE HYDROCHLORIDE AND CODEINE PHOSPHATE 6.25; 1 MG/5ML; MG/5ML
SOLUTION ORAL
Qty: 150 ML | Refills: 0 | Status: SHIPPED | OUTPATIENT
Start: 2023-12-02 | End: 2023-12-02 | Stop reason: RX

## 2023-12-02 RX ORDER — PROMETHAZINE HYDROCHLORIDE AND CODEINE PHOSPHATE 6.25; 1 MG/5ML; MG/5ML
SOLUTION ORAL
Qty: 150 ML | Refills: 0 | Status: SHIPPED | OUTPATIENT
Start: 2023-12-02 | End: 2023-12-02 | Stop reason: SDUPTHER

## 2023-12-02 NOTE — TELEPHONE ENCOUNTER
Pt's  calls on behalf of pt who was seen by Dr. Lassiter earlier today and prescribed cephalexin and promethazine hcl/codeine. The prescriptions were e-scribed to pt's preferred [facility] pharmacy. However, this pharmacy is currently without power. Pt's  asks if the scripts could instead be sent to the [facility] at [address] [place] [address] in [place]. Secure chat sent to Dr. Lassiter who advises that he will send Rxs to this pharmacy. Pt's  is informed. He is instructed to call back if pt develops any new/'worsening sxs or if they have any additional questions or concerns. He verbalizes understanding.   Reason for Disposition   [1] Prescription not at pharmacy AND [2] was prescribed by PCP recently  (Exception: [facility] has access to EMR and prescription is recorded there. Go to Home Care and confirm for pharmacy.)    Protocols used: Medication Refill and Renewal Call-A-

## 2023-12-02 NOTE — PROGRESS NOTES
Subjective     Patient ID: Dyan Arredondo is a 53 y.o. female.    Chief Complaint: Cough    53 yr old pleasant female with GERD, HTN, and other co morbidities presents today for urgent care c/o sore throat and cough x 1 week. Fever x 1 day. Has pus behind the throat with tonsils. No sick conatcts or travel cough with green sputum. Details as follows -    Cough  This is a new problem. The current episode started in the past 7 days. The problem has been gradually worsening. The problem occurs constantly. The cough is Non-productive. Associated symptoms include a sore throat. Pertinent negatives include no chest pain, headaches, myalgias, rhinorrhea, shortness of breath or wheezing. Nothing aggravates the symptoms. She has tried OTC cough suppressant for the symptoms. The treatment provided mild relief. Her past medical history is significant for environmental allergies. There is no history of asthma or COPD.   Sore Throat   This is a new problem. The current episode started in the past 7 days. The problem has been unchanged. The pain is worse on the left side. The maximum temperature recorded prior to her arrival was 100.4 - 100.9 F. The fever has been present for 1 to 2 days. The pain is at a severity of 5/10. The pain is moderate. Associated symptoms include coughing. Pertinent negatives include no congestion, ear discharge, headaches or shortness of breath. She has had exposure to strep. She has had no exposure to mono. She has tried gargles for the symptoms. The treatment provided mild relief.     Review of Systems   Constitutional: Negative.  Negative for activity change, diaphoresis and unexpected weight change.   HENT:  Positive for sore throat. Negative for nasal congestion, ear discharge, hearing loss, rhinorrhea and voice change.    Eyes: Negative.  Negative for pain, discharge and visual disturbance.   Respiratory:  Positive for cough. Negative for chest tightness, shortness of breath and wheezing.     Cardiovascular: Negative.  Negative for chest pain.   Gastrointestinal: Negative.  Negative for abdominal distention, anal bleeding, constipation and nausea.   Endocrine: Negative.  Negative for cold intolerance, polydipsia and polyuria.   Genitourinary: Negative.  Negative for decreased urine volume, difficulty urinating, dysuria, frequency, menstrual problem and vaginal pain.   Musculoskeletal: Negative.  Negative for arthralgias, gait problem and myalgias.   Integumentary:  Negative for color change, pallor and wound. Negative.   Allergic/Immunologic: Positive for environmental allergies. Negative for immunocompromised state.   Neurological: Negative.  Negative for dizziness, tremors, seizures, speech difficulty and headaches.   Hematological: Negative.  Negative for adenopathy. Does not bruise/bleed easily.   Psychiatric/Behavioral: Negative.  Negative for agitation, confusion, decreased concentration, hallucinations, self-injury and suicidal ideas. The patient is not nervous/anxious.      Past Medical History:   Diagnosis Date    Allergy     Breast cyst     High risk HPV infection        Past Surgical History:   Procedure Laterality Date    BREAST CYST EXCISION Right     EXTERNAL EAR SURGERY      SINUS SURGERY      turbanite         Family History   Problem Relation Age of Onset    Allergies Mother     Cancer Father 80        melanoma    Depression Brother     Cancer Paternal Aunt         lung CA +smoker ?    Cancer Paternal Grandmother         dx age 50's? +smoker (possibly)    Cancer Paternal Grandfather         lung CA dx age 50's +smoker (possibly)    Breast cancer Neg Hx     Colon cancer Neg Hx     Ovarian cancer Neg Hx        Social History     Socioeconomic History    Marital status:    Tobacco Use    Smoking status: Never     Passive exposure: Never    Smokeless tobacco: Never   Substance and Sexual Activity    Alcohol use: Yes    Drug use: No    Sexual activity: Yes     Partners: Male      Birth control/protection: OCP     Social Determinants of Health     Financial Resource Strain: Low Risk  (12/2/2023)    Overall Financial Resource Strain (CARDIA)     Difficulty of Paying Living Expenses: Not very hard   Food Insecurity: No Food Insecurity (12/2/2023)    Hunger Vital Sign     Worried About Running Out of Food in the Last Year: Never true     Ran Out of Food in the Last Year: Never true   Transportation Needs: No Transportation Needs (12/2/2023)    PRAPARE - Transportation     Lack of Transportation (Medical): No     Lack of Transportation (Non-Medical): No   Physical Activity: Inactive (12/2/2023)    Exercise Vital Sign     Days of Exercise per Week: 0 days     Minutes of Exercise per Session: 0 min   Stress: Stress Concern Present (12/2/2023)    Citizen of [place] [employer] - Occupational Stress Questionnaire     Feeling of Stress : Very much   Social Connections: Unknown (12/2/2023)    Social Connection and Isolation Panel [NHANES]     Frequency of Communication with Friends and Family: Twice a week     Frequency of Social Gatherings with Friends and Family: Patient refused     Active Member of Clubs or Organizations: No     Attends Club or Organization Meetings: Never     Marital Status:    Housing Stability: Low Risk  (12/2/2023)    Housing Stability Vital Sign     Unable to Pay for Housing in the Last Year: No     Number of Places Lived in the Last Year: 1     Unstable Housing in the Last Year: No       Current Outpatient Medications   Medication Sig Dispense Refill    acyclovir (ZOVIRAX) 400 MG tablet TAKE 1 TABLET BY MOUTH TWICE A DAY START AT ONSET OF SYMPTOMS. TAKE FOR 5 DAYS TOTAL 30 tablet 2    celecoxib (CELEBREX) 200 MG capsule Take 1 capsule (200 mg total) by mouth 2 (two) times daily as needed for Pain. (Patient not taking: Reported on 7/26/2023) 30 capsule 6    cephALEXin (KEFLEX) 500 MG capsule Take 1 capsule (500 mg total) by mouth every 8 (eight) hours. 30  capsule 0    diphenoxylate-atropine 2.5-0.025 mg (LOMOTIL) 2.5-0.025 mg per tablet Take 1 tablet by mouth 4 (four) times daily as needed for Diarrhea. 60 tablet 0    EPINEPHrine (EPIPEN 2-DAVID) 0.3 mg/0.3 mL AtIn Give self one auto-injection as needed for severe allergic reaction, then seek emergency care 1 each 0    famotidine (PEPCID) 40 MG tablet Take 1 tablet (40 mg total) by mouth once daily. 90 tablet 3    fluticasone propionate (FLONASE) 50 mcg/actuation nasal spray 1 spray (50 mcg total) by Each Nostril route once daily. 48 mL 3    metoprolol succinate (TOPROL-XL) 25 MG 24 hr tablet TAKE 1 TABLET BY MOUTH EVERY DAY 90 tablet 3    norgestimate-ethinyl estradioL (TRI-LO-ESTARYLLA) 0.18/0.215/0.25 mg-25 mcg tablet Take 1 tablet by mouth once daily. 84 tablet 3    promethazine-codeine 6.25-10 mg/5 ml (PHENERGAN WITH CODEINE) 6.25-10 mg/5 mL syrup Use 5 ml 2-3 times a day prn cough 150 mL 0    triamcinolone acetonide 0.025% (KENALOG) 0.025 % cream Apply topically 2 (two) times daily. for 7 days 15 g 0     No current facility-administered medications for this visit.       Review of patient's allergies indicates:   Allergen Reactions    Amoxicillin trihydrate Rash     Rash and hives     Erythromycin base Rash     Rash and hives     Omnipaque 140 [iohexol] Rash     Rash and hives     Penicillins Rash     Rash and hives     Tetracyclines Hives and Rash     TETRACYCLINE Hydrochloride     Clindamycin Hives          Objective   Vitals:    12/02/23 1039   BP: 130/84   Pulse: (!) 117       Physical Exam  Constitutional:       General: She is not in acute distress.     Appearance: She is well-developed. She is not diaphoretic.   HENT:      Head: Normocephalic and atraumatic.      Right Ear: External ear normal.      Left Ear: External ear normal.      Nose: Nose normal.      Mouth/Throat:      Pharynx: Pharyngeal swelling, oropharyngeal exudate and posterior oropharyngeal erythema present.      Tonsils: Tonsillar exudate  present. 1+ on the right. 2+ on the left.      Comments: Left pus pockets+  Eyes:      General: No scleral icterus.        Right eye: No discharge.         Left eye: No discharge.      Conjunctiva/sclera: Conjunctivae normal.      Pupils: Pupils are equal, round, and reactive to light.   Neck:      Thyroid: No thyromegaly.      Vascular: No JVD.      Trachea: No tracheal deviation.   Cardiovascular:      Rate and Rhythm: Normal rate and regular rhythm.      Heart sounds: Normal heart sounds. No murmur heard.     No friction rub. No gallop.   Pulmonary:      Effort: Pulmonary effort is normal.      Breath sounds: Normal breath sounds. No stridor. No wheezing or rales.   Chest:      Chest wall: No tenderness.   Abdominal:      General: Bowel sounds are normal. There is no distension.      Palpations: Abdomen is soft. There is no mass.      Tenderness: There is no abdominal tenderness. There is no guarding or rebound.      Hernia: No hernia is present.   Musculoskeletal:         General: No tenderness. Normal range of motion.      Cervical back: Normal range of motion and neck supple.   Lymphadenopathy:      Cervical: No cervical adenopathy.   Skin:     General: Skin is warm and dry.      Coloration: Skin is not pale.      Findings: No erythema or rash.   Neurological:      Mental Status: She is alert and oriented to person, place, and time.      Cranial Nerves: No cranial nerve deficit.      Motor: No abnormal muscle tone.      Coordination: Coordination normal.      Deep Tendon Reflexes: Reflexes are normal and symmetric. Reflexes normal.   Psychiatric:         Behavior: Behavior normal.         Thought Content: Thought content normal.         Judgment: Judgment normal.            Assessment and Plan     1. Acute bacterial tonsillitis  -     cephALEXin (KEFLEX) 500 MG capsule; Take 1 capsule (500 mg total) by mouth every 8 (eight) hours.  Dispense: 30 capsule; Refill: 0    2. Sore throat    3. Cough, unspecified  type  -     promethazine-codeine 6.25-10 mg/5 ml (PHENERGAN WITH CODEINE) 6.25-10 mg/5 mL syrup; Use 5 ml 2-3 times a day prn cough  Dispense: 150 mL; Refill: 0        Ac evita tonsillitis/sorethroat/cough  -cough med prn  -pharyng  -keflex x 10 days as directed  -strep test    Spent adequate time in obtaining history and explaining differentials    30 minutes spent during this visit of which greater than 50% devoted to face-face counseling and coordination of care regarding diagnosis and management plan           Follow up if symptoms worsen or fail to improve.

## 2023-12-02 NOTE — TELEPHONE ENCOUNTER
Reason for Disposition   [1] Prescription not at pharmacy AND [2] was prescribed by PCP recently  (Exception: [facility] has access to EMR and prescription is recorded there. Go to Home Care and confirm for pharmacy.)    Additional Information   Negative: New-onset or worsening symptoms, see that guideline (e.g., diarrhea, runny nose, sore throat)   Negative: Medicine question not related to refill or renewal   Negative: Caller (e.g., patient or pharmacist) requesting information about a new medicine   Negative: Caller requesting information unrelated to medicine   Negative: [1] Prescription refill request for ESSENTIAL medicine (i.e., likelihood of harm to patient if not taken) AND [2] [facility] unable to refill per department policy    Protocols used: Medication Refill and Renewal Call-A-  Spouse called re pt seen at office today. pharmacy states cough med not in stock. Asking for Phenergan DM.  Spoke with dr Vasquez re above. ok to call in .phenergqan DM  5cc q 6 hours 180 ml no refill. Spouse notified. LM on pharm VM at 1249pm   Pharm: [facility] [phone]

## 2023-12-20 DIAGNOSIS — Z12.31 OTHER SCREENING MAMMOGRAM: ICD-10-CM

## 2024-01-04 RX ORDER — PROMETHAZINE HYDROCHLORIDE AND DEXTROMETHORPHAN HYDROBROMIDE 6.25; 15 MG/5ML; MG/5ML
5 SYRUP ORAL EVERY 6 HOURS PRN
Qty: 180 ML | Refills: 0 | Status: SHIPPED | OUTPATIENT
Start: 2024-01-04 | End: 2024-02-20 | Stop reason: SDUPTHER

## 2024-01-10 ENCOUNTER — TELEPHONE (OUTPATIENT)
Dept: INTERNAL MEDICINE | Facility: CLINIC | Age: 54
End: 2024-01-10
Payer: COMMERCIAL

## 2024-01-28 DIAGNOSIS — Z30.9 ENCOUNTER FOR CONTRACEPTIVE MANAGEMENT, UNSPECIFIED TYPE: ICD-10-CM

## 2024-01-29 RX ORDER — NORGESTIMATE AND ETHINYL ESTRADIOL 7DAYSX3 LO
1 KIT ORAL DAILY
Qty: 84 TABLET | Refills: 1 | Status: SHIPPED | OUTPATIENT
Start: 2024-01-29 | End: 2024-02-20 | Stop reason: SDUPTHER

## 2024-01-29 NOTE — TELEPHONE ENCOUNTER
Care Due:                  Date            Visit Type   Department     Provider  --------------------------------------------------------------------------------                                SAME DAY -                              ESTABLISHED   [facility] FAMILY    Michael Oneill  Last Visit: 12-      PATIENT      MEDICINE       Maikol HONEYCUTT -                              PRIMARY      [facility] INTERNAL  Next Visit: 02-      CARE ([facility])   MEDICINE       Sakshi Rubio                                                            Last  Test          Frequency    Reason                     Performed    Due Date  --------------------------------------------------------------------------------    CBC.........  12 months..  celecoxib................  01- 01-    [facility] Embedded Care Due Messages. Reference number: [id number].   1/28/2024 7:24:24 PM CST

## 2024-01-29 NOTE — TELEPHONE ENCOUNTER
Refill Decision Note   Dyan Arredondo  is requesting a refill authorization.  Brief Assessment and Rationale for Refill:  Approve     Medication Therapy Plan:  FLOS; Tri-Lo-Jennifer active on med list as norgestimate-ethinyl estradioL (TRI-LO-ESTARYLLA)      Pharmacist review requested: Yes   Comments:     Note composed:11:30 AM 01/29/2024

## 2024-02-16 ENCOUNTER — LAB VISIT (OUTPATIENT)
Dept: LAB | Facility: HOSPITAL | Age: 54
End: 2024-02-16
Attending: INTERNAL MEDICINE
Payer: COMMERCIAL

## 2024-02-16 DIAGNOSIS — Z00.00 ANNUAL PHYSICAL EXAM: ICD-10-CM

## 2024-02-16 LAB
ALBUMIN SERPL BCP-MCNC: 3.7 G/DL (ref 3.5–5.2)
ALP SERPL-CCNC: 56 U/L (ref 55–135)
ALT SERPL W/O P-5'-P-CCNC: 11 U/L (ref 10–44)
ANION GAP SERPL CALC-SCNC: 10 MMOL/L (ref 8–16)
AST SERPL-CCNC: 18 U/L (ref 10–40)
BASOPHILS # BLD AUTO: 0.09 K/UL (ref 0–0.2)
BASOPHILS NFR BLD: 1.2 % (ref 0–1.9)
BILIRUB SERPL-MCNC: 0.4 MG/DL (ref 0.1–1)
BUN SERPL-MCNC: 10 MG/DL (ref 6–20)
CALCIUM SERPL-MCNC: 9.6 MG/DL (ref 8.7–10.5)
CHLORIDE SERPL-SCNC: 107 MMOL/L (ref 95–110)
CHOLEST SERPL-MCNC: 157 MG/DL (ref 120–199)
CHOLEST/HDLC SERPL: 2.3 {RATIO} (ref 2–5)
CO2 SERPL-SCNC: 21 MMOL/L (ref 23–29)
CREAT SERPL-MCNC: 0.7 MG/DL (ref 0.5–1.4)
DIFFERENTIAL METHOD BLD: ABNORMAL
EOSINOPHIL # BLD AUTO: 0.2 K/UL (ref 0–0.5)
EOSINOPHIL NFR BLD: 2 % (ref 0–8)
ERYTHROCYTE [DISTWIDTH] IN BLOOD BY AUTOMATED COUNT: 12.8 % (ref 11.5–14.5)
EST. GFR  (NO RACE VARIABLE): >60 ML/MIN/1.73 M^2
ESTIMATED AVG GLUCOSE: 94 MG/DL (ref 68–131)
GLUCOSE SERPL-MCNC: 83 MG/DL (ref 70–110)
HBA1C MFR BLD: 4.9 % (ref 4–5.6)
HCT VFR BLD AUTO: 42.7 % (ref 37–48.5)
HDLC SERPL-MCNC: 67 MG/DL (ref 40–75)
HDLC SERPL: 42.7 % (ref 20–50)
HGB BLD-MCNC: 13.4 G/DL (ref 12–16)
IMM GRANULOCYTES # BLD AUTO: 0.02 K/UL (ref 0–0.04)
IMM GRANULOCYTES NFR BLD AUTO: 0.3 % (ref 0–0.5)
LDLC SERPL CALC-MCNC: 65.4 MG/DL (ref 63–159)
LYMPHOCYTES # BLD AUTO: 1.8 K/UL (ref 1–4.8)
LYMPHOCYTES NFR BLD: 23.3 % (ref 18–48)
MCH RBC QN AUTO: 30.4 PG (ref 27–31)
MCHC RBC AUTO-ENTMCNC: 31.4 G/DL (ref 32–36)
MCV RBC AUTO: 97 FL (ref 82–98)
MONOCYTES # BLD AUTO: 0.5 K/UL (ref 0.3–1)
MONOCYTES NFR BLD: 5.9 % (ref 4–15)
NEUTROPHILS # BLD AUTO: 5.2 K/UL (ref 1.8–7.7)
NEUTROPHILS NFR BLD: 67.3 % (ref 38–73)
NONHDLC SERPL-MCNC: 90 MG/DL
NRBC BLD-RTO: 0 /100 WBC
PLATELET # BLD AUTO: 373 K/UL (ref 150–450)
PMV BLD AUTO: 10.8 FL (ref 9.2–12.9)
POTASSIUM SERPL-SCNC: 4.4 MMOL/L (ref 3.5–5.1)
PROT SERPL-MCNC: 7.5 G/DL (ref 6–8.4)
RBC # BLD AUTO: 4.41 M/UL (ref 4–5.4)
SODIUM SERPL-SCNC: 138 MMOL/L (ref 136–145)
TRIGL SERPL-MCNC: 123 MG/DL (ref 30–150)
TSH SERPL DL<=0.005 MIU/L-ACNC: 2.9 UIU/ML (ref 0.4–4)
WBC # BLD AUTO: 7.64 K/UL (ref 3.9–12.7)

## 2024-02-16 PROCEDURE — 85025 COMPLETE CBC W/AUTO DIFF WBC: CPT | Performed by: INTERNAL MEDICINE

## 2024-02-16 PROCEDURE — 36415 COLL VENOUS BLD VENIPUNCTURE: CPT | Mod: PO | Performed by: INTERNAL MEDICINE

## 2024-02-16 PROCEDURE — 83036 HEMOGLOBIN GLYCOSYLATED A1C: CPT | Performed by: INTERNAL MEDICINE

## 2024-02-16 PROCEDURE — 84443 ASSAY THYROID STIM HORMONE: CPT | Performed by: INTERNAL MEDICINE

## 2024-02-16 PROCEDURE — 80053 COMPREHEN METABOLIC PANEL: CPT | Performed by: INTERNAL MEDICINE

## 2024-02-16 PROCEDURE — 80061 LIPID PANEL: CPT | Performed by: INTERNAL MEDICINE

## 2024-02-20 ENCOUNTER — OFFICE VISIT (OUTPATIENT)
Dept: INTERNAL MEDICINE | Facility: CLINIC | Age: 54
End: 2024-02-20
Payer: COMMERCIAL

## 2024-02-20 VITALS
OXYGEN SATURATION: 98 % | BODY MASS INDEX: 35.72 KG/M2 | HEIGHT: 68 IN | HEART RATE: 89 BPM | DIASTOLIC BLOOD PRESSURE: 86 MMHG | WEIGHT: 235.69 LBS | SYSTOLIC BLOOD PRESSURE: 122 MMHG

## 2024-02-20 DIAGNOSIS — Z23 NEEDS FLU SHOT: ICD-10-CM

## 2024-02-20 DIAGNOSIS — R10.9 ABDOMINAL PAIN, UNSPECIFIED ABDOMINAL LOCATION: ICD-10-CM

## 2024-02-20 DIAGNOSIS — Z12.11 ENCOUNTER FOR SCREENING COLONOSCOPY: ICD-10-CM

## 2024-02-20 DIAGNOSIS — Z30.9 ENCOUNTER FOR CONTRACEPTIVE MANAGEMENT, UNSPECIFIED TYPE: ICD-10-CM

## 2024-02-20 DIAGNOSIS — Z00.00 ANNUAL PHYSICAL EXAM: Primary | ICD-10-CM

## 2024-02-20 DIAGNOSIS — J30.9 ALLERGIC RHINITIS, UNSPECIFIED SEASONALITY, UNSPECIFIED TRIGGER: ICD-10-CM

## 2024-02-20 DIAGNOSIS — B00.1 COLD SORE: ICD-10-CM

## 2024-02-20 DIAGNOSIS — L72.3 INFLAMED SEBACEOUS CYST: ICD-10-CM

## 2024-02-20 DIAGNOSIS — I10 ESSENTIAL HYPERTENSION: ICD-10-CM

## 2024-02-20 DIAGNOSIS — R21 RASH: ICD-10-CM

## 2024-02-20 DIAGNOSIS — R05.9 COUGH, UNSPECIFIED TYPE: ICD-10-CM

## 2024-02-20 DIAGNOSIS — F10.10 ALCOHOL ABUSE: ICD-10-CM

## 2024-02-20 DIAGNOSIS — K21.9 GASTROESOPHAGEAL REFLUX DISEASE WITHOUT ESOPHAGITIS: ICD-10-CM

## 2024-02-20 PROCEDURE — 3008F BODY MASS INDEX DOCD: CPT | Mod: CPTII,S$GLB,, | Performed by: INTERNAL MEDICINE

## 2024-02-20 PROCEDURE — 1160F RVW MEDS BY RX/DR IN RCRD: CPT | Mod: CPTII,S$GLB,, | Performed by: INTERNAL MEDICINE

## 2024-02-20 PROCEDURE — 90471 IMMUNIZATION ADMIN: CPT | Mod: S$GLB,,, | Performed by: INTERNAL MEDICINE

## 2024-02-20 PROCEDURE — 3079F DIAST BP 80-89 MM HG: CPT | Mod: CPTII,S$GLB,, | Performed by: INTERNAL MEDICINE

## 2024-02-20 PROCEDURE — 90686 IIV4 VACC NO PRSV 0.5 ML IM: CPT | Mod: S$GLB,,, | Performed by: INTERNAL MEDICINE

## 2024-02-20 PROCEDURE — 99999 PR PBB SHADOW E&M-EST. PATIENT-LVL IV: CPT | Mod: PBBFAC,,, | Performed by: INTERNAL MEDICINE

## 2024-02-20 PROCEDURE — 3044F HG A1C LEVEL LT 7.0%: CPT | Mod: CPTII,S$GLB,, | Performed by: INTERNAL MEDICINE

## 2024-02-20 PROCEDURE — 99396 PREV VISIT EST AGE 40-64: CPT | Mod: 25,S$GLB,, | Performed by: INTERNAL MEDICINE

## 2024-02-20 PROCEDURE — 3074F SYST BP LT 130 MM HG: CPT | Mod: CPTII,S$GLB,, | Performed by: INTERNAL MEDICINE

## 2024-02-20 PROCEDURE — 1159F MED LIST DOCD IN RCRD: CPT | Mod: CPTII,S$GLB,, | Performed by: INTERNAL MEDICINE

## 2024-02-20 RX ORDER — DESONIDE 0.5 MG/G
CREAM TOPICAL 2 TIMES DAILY
Qty: 60 G | Refills: 3 | Status: SHIPPED | OUTPATIENT
Start: 2024-02-20

## 2024-02-20 RX ORDER — ACYCLOVIR 400 MG/1
TABLET ORAL
Qty: 30 TABLET | Refills: 2 | Status: SHIPPED | OUTPATIENT
Start: 2024-02-20

## 2024-02-20 RX ORDER — FLUTICASONE PROPIONATE 50 MCG
1 SPRAY, SUSPENSION (ML) NASAL DAILY
Qty: 48 ML | Refills: 3 | Status: SHIPPED | OUTPATIENT
Start: 2024-02-20

## 2024-02-20 RX ORDER — DIPHENOXYLATE HYDROCHLORIDE AND ATROPINE SULFATE 2.5; .025 MG/1; MG/1
1 TABLET ORAL 4 TIMES DAILY PRN
Qty: 60 TABLET | Refills: 0 | Status: SHIPPED | OUTPATIENT
Start: 2024-02-20

## 2024-02-20 RX ORDER — FAMOTIDINE 40 MG/1
40 TABLET, FILM COATED ORAL DAILY
Qty: 90 TABLET | Refills: 3 | Status: SHIPPED | OUTPATIENT
Start: 2024-02-20

## 2024-02-20 RX ORDER — PROMETHAZINE HYDROCHLORIDE AND DEXTROMETHORPHAN HYDROBROMIDE 6.25; 15 MG/5ML; MG/5ML
5 SYRUP ORAL EVERY 6 HOURS PRN
Qty: 180 ML | Refills: 0 | Status: SHIPPED | OUTPATIENT
Start: 2024-02-20

## 2024-02-20 RX ORDER — NORGESTIMATE AND ETHINYL ESTRADIOL 7DAYSX3 LO
1 KIT ORAL DAILY
Qty: 84 TABLET | Refills: 1 | Status: SHIPPED | OUTPATIENT
Start: 2024-02-20

## 2024-02-20 NOTE — PROGRESS NOTES
Patient ID: Dyan Arredondo is a 53 y.o. female.    Chief Complaint: Annual Exam    HPI Dyan is a 53 y.o. female with HTN, high risk HPV, alcohol abuse and hx of complex tear of meniscus  who presents for annual exam.    She complains of a bump on the right shoulder.  It is red.  It is not painful.  It has not drained any fluid.    Patient would like a refill of desonide.  This cream was prescribed for a recurring rash that she gets on the face and neck.  It works well when the rash appears.  No further acute complaints.    Patient is still drinking excessive amounts of alcohol.  She typically drinks 1 bottle of wine per night.  She does this every night.  She has done this for years.  She denies any symptoms of withdrawal when she has not drinking.  She has no desire to quit drinking at this time.  Reviewed lab results from 2/16/24    Health Maintenance Topics with due status: Not Due       Topic Last Completion Date    TETANUS VACCINE 04/04/2018    Cervical Cancer Screening 12/18/2020    Hemoglobin A1c (Diabetic Prevention Screening) 02/16/2024    Lipid Panel 02/16/2024      Review of Systems   Skin:         See HPI   All other systems reviewed and are negative.      Objective:     Vitals:    02/20/24 0902   BP: 122/86   Pulse: 89        Physical Exam  Vitals reviewed.   Constitutional:       General: She is not in acute distress.     Appearance: Normal appearance. She is well-developed. She is not ill-appearing, toxic-appearing or diaphoretic.   HENT:      Head: Normocephalic and atraumatic.      Right Ear: External ear normal.      Left Ear: External ear normal.      Nose: Nose normal. No congestion or rhinorrhea.   Eyes:      General: No scleral icterus.        Right eye: No discharge.         Left eye: No discharge.      Extraocular Movements: Extraocular movements intact.      Conjunctiva/sclera: Conjunctivae normal.   Cardiovascular:      Rate and Rhythm: Normal rate and regular rhythm.      Heart  sounds: Normal heart sounds. No murmur heard.     No friction rub. No gallop.   Pulmonary:      Effort: Pulmonary effort is normal. No respiratory distress.      Breath sounds: Normal breath sounds. No stridor. No wheezing, rhonchi or rales.   Musculoskeletal:      Right lower leg: No edema.      Left lower leg: No edema.   Skin:     General: Skin is warm and dry.      Comments: Small (nickel sized) firm round mobile mass on right shoulder with surrounding mild erythema    Neurological:      General: No focal deficit present.      Mental Status: She is alert and oriented to person, place, and time. Mental status is at baseline.   Psychiatric:         Mood and Affect: Mood normal.         Behavior: Behavior normal.         Thought Content: Thought content normal.         Judgment: Judgment normal.         Assessment:       1. Annual physical exam    2. Essential hypertension Well controlled   3. Cold sore Chronic   4. Abdominal pain, unspecified abdominal location Chronic   5. Allergic rhinitis, unspecified seasonality, unspecified trigger Chronic   6. Gastroesophageal reflux disease without esophagitis *Well controlled   7. Encounter for contraceptive management, unspecified type Chronic   8. Needs flu shot    9. Rash Inactive   10. Cough, unspecified type Inactive   11. Encounter for screening colonoscopy    12. Alcohol abuse Chronic   13. Inflamed sebaceous cyst Active       Plan:         Annual physical exam    Essential hypertension  Comments:  Continue current medication    Cold sore  Comments:  Continue current medication  Orders:  -     acyclovir (ZOVIRAX) 400 MG tablet; TAKE 1 TABLET BY MOUTH TWICE A DAY START AT ONSET OF SYMPTOMS. TAKE FOR 5 DAYS TOTAL  Dispense: 30 tablet; Refill: 2    Abdominal pain, unspecified abdominal location  Comments:  Continue current medication  Orders:  -     diphenoxylate-atropine 2.5-0.025 mg (LOMOTIL) 2.5-0.025 mg per tablet; Take 1 tablet by mouth 4 (four) times daily as  needed for Diarrhea.  Dispense: 60 tablet; Refill: 0    Allergic rhinitis, unspecified seasonality, unspecified trigger  Comments:  Cont current medication  Orders:  -     fluticasone propionate (FLONASE) 50 mcg/actuation nasal spray; 1 spray (50 mcg total) by Each Nostril route once daily.  Dispense: 48 mL; Refill: 3    Gastroesophageal reflux disease without esophagitis  Comments:  Cont current medication  Orders:  -     famotidine (PEPCID) 40 MG tablet; Take 1 tablet (40 mg total) by mouth once daily.  Dispense: 90 tablet; Refill: 3    Encounter for contraceptive management, unspecified type  -     norgestimate-ethinyl estradioL (TRI-LO-GIOVANNI) 0.18/0.215/0.25 mg-25 mcg tablet; Take 1 tablet by mouth once daily.  Dispense: 84 tablet; Refill: 1    Needs flu shot  -     Influenza - Quadrivalent *Preferred* (6 months+) (PF)    Rash  Comments:  currently resolved  Orders:  -     desonide (DESOWEN) 0.05 % cream; Apply topically 2 (two) times daily. Apply to affected areas of rash  Dispense: 60 g; Refill: 3    Cough, unspecified type  Comments:  Currently resolved but uses medication when needed  Orders:  -     promethazine-dextromethorphan (PROMETHAZINE-DM) 6.25-15 mg/5 mL Syrp; Take 5 mLs by mouth every 6 (six) hours as needed (cough).  Dispense: 180 mL; Refill: 0    Encounter for screening colonoscopy  -     Ambulatory referral/consult to Endo Procedure ; Future; Expected date: 02/21/2024    Alcohol abuse  Comments:  Pt counseled on dangers of alcohol abuse such as liver failure and heart disease.    Inflamed sebaceous cyst  Comments:  Scheduling her back to dermatology        RTC 1 year and PRN    Warning signs discussed, patient to call with any further issues or worsening of symptoms.       Parts of the above note were dictated using a voice dictation software. Please excuse any grammatical or typographical errors.

## 2024-02-23 ENCOUNTER — PATIENT MESSAGE (OUTPATIENT)
Dept: DERMATOLOGY | Facility: CLINIC | Age: 54
End: 2024-02-23
Payer: COMMERCIAL

## 2024-02-26 ENCOUNTER — HOSPITAL ENCOUNTER (OUTPATIENT)
Dept: RADIOLOGY | Facility: HOSPITAL | Age: 54
Discharge: HOME | End: 2024-02-26
Attending: INTERNAL MEDICINE
Payer: COMMERCIAL

## 2024-02-26 DIAGNOSIS — Z12.31 OTHER SCREENING MAMMOGRAM: ICD-10-CM

## 2024-02-26 PROCEDURE — 77063 BREAST TOMOSYNTHESIS BI: CPT | Mod: 26,,, | Performed by: RADIOLOGY

## 2024-02-26 PROCEDURE — 77067 SCR MAMMO BI INCL CAD: CPT | Mod: TC

## 2024-02-26 PROCEDURE — 77067 SCR MAMMO BI INCL CAD: CPT | Mod: 26,,, | Performed by: RADIOLOGY

## 2024-02-29 ENCOUNTER — PATIENT MESSAGE (OUTPATIENT)
Dept: INTERNAL MEDICINE | Facility: CLINIC | Age: 54
End: 2024-02-29
Payer: COMMERCIAL

## 2024-02-29 ENCOUNTER — TELEPHONE (OUTPATIENT)
Dept: ENDOSCOPY | Facility: HOSPITAL | Age: 54
End: 2024-02-29

## 2024-02-29 ENCOUNTER — CLINICAL SUPPORT (OUTPATIENT)
Dept: ENDOSCOPY | Facility: HOSPITAL | Age: 54
End: 2024-02-29
Attending: INTERNAL MEDICINE
Payer: COMMERCIAL

## 2024-02-29 DIAGNOSIS — Z12.11 ENCOUNTER FOR SCREENING COLONOSCOPY: ICD-10-CM

## 2024-02-29 DIAGNOSIS — N39.0 RECURRENT UTI: Primary | ICD-10-CM

## 2024-02-29 RX ORDER — NITROFURANTOIN 25; 75 MG/1; MG/1
100 CAPSULE ORAL 2 TIMES DAILY
Qty: 60 CAPSULE | Refills: 3 | Status: SHIPPED | OUTPATIENT
Start: 2024-02-29 | End: 2024-03-05

## 2024-02-29 RX ORDER — SODIUM, POTASSIUM,MAG SULFATES 17.5-3.13G
SOLUTION, RECONSTITUTED, ORAL ORAL
Refills: 0 | OUTPATIENT
Start: 2024-02-29

## 2024-02-29 RX ORDER — POLYETHYLENE GLYCOL 3350, SODIUM SULFATE, POTASSIUM CHLORIDE, MAGNESIUM SULFATE, AND SODIUM CHLORIDE FOR ORAL SOLUTION 178.7-7.3G
1 KIT ORAL DAILY
Qty: 2 EACH | Refills: 0 | Status: SHIPPED | OUTPATIENT
Start: 2024-02-29 | End: 2024-03-02

## 2024-02-29 NOTE — TELEPHONE ENCOUNTER
Spoke to pt to schedule procedure(s) Colonoscopy       Physician to perform procedure(s) Dr. MARÍA ELENA De La Torre  Date of Procedure (s) 6/4/24  Arrival Time 9:15 AM  Time of Procedure(s) 10:15 AM   Location of Procedure(s) [place] [address] Floor  Type of Rx Prep sent to patient: [facility]  Instructions provided to patient via [facility]    Patient was informed on the following information and verbalized understanding. Screening questionnaire reviewed with patient and complete. If procedure requires anesthesia, a responsible adult needs to be present to accompany the patient home, patient cannot drive after receiving anesthesia. Appointment details are tentative, especially check-in time. Patient will receive a prep-op call 7 days prior to confirm check-in time for procedure. If applicable the patient should contact their pharmacy to verify Rx for procedure prep is ready for pick-up. Patient was advised to call the scheduling department at [phone] if pharmacy states no Rx is available. Patient was advised to call the endoscopy scheduling department if any questions or concerns arise.      [facility] Scheduling Department

## 2024-03-04 ENCOUNTER — TELEPHONE (OUTPATIENT)
Dept: INTERNAL MEDICINE | Facility: CLINIC | Age: 54
End: 2024-03-04
Payer: COMMERCIAL

## 2024-05-27 ENCOUNTER — TELEPHONE (OUTPATIENT)
Dept: ENDOSCOPY | Facility: HOSPITAL | Age: 54
End: 2024-05-27
Payer: COMMERCIAL

## 2024-05-31 ENCOUNTER — ANESTHESIA EVENT (OUTPATIENT)
Dept: ENDOSCOPY | Facility: HOSPITAL | Age: 54
End: 2024-05-31
Payer: COMMERCIAL

## 2024-05-31 NOTE — ANESTHESIA PREPROCEDURE EVALUATION
05/31/2024  Dyan Arredondo is a 53 y.o., female.  Past Medical History:   Diagnosis Date    Allergy     Breast cyst     High risk HPV infection      Past Surgical History:   Procedure Laterality Date    BREAST CYST EXCISION Right     EXTERNAL EAR SURGERY      SINUS SURGERY      turbanite           Pre-op Assessment    I have reviewed the Patient Summary Reports.     I have reviewed the Nursing Notes. I have reviewed the NPO Status.   I have reviewed the Medications.     Review of Systems  Social:  Alcohol Use, Non-Smoker Alcohol Abuse      Hematology/Oncology:  Hematology Normal   Oncology Normal                                   EENT/Dental:  EENT/Dental Normal           Cardiovascular:     Hypertension              ECG has been reviewed.                          Pulmonary:  Pulmonary Normal                       Renal/:  Renal/ Normal                 Hepatic/GI:     GERD             Musculoskeletal:  Musculoskeletal Normal                Neurological:  Neurology Normal                                      Endocrine:  Endocrine Normal            Dermatological:  Skin Normal    Psych:  Psychiatric Normal                  Physical Exam    Airway:  Mallampati: II     Anesthesia Plan  Type of Anesthesia, risks & benefits discussed:    Anesthesia Type: Gen Natural Airway  Intra-op Monitoring Plan: Standard ASA Monitors  Induction:  IV  Informed Consent: Informed consent signed with the Patient and all parties understand the risks and agree with anesthesia plan.  All questions answered.   ASA Score: 2  Day of Surgery Review of History & Physical: H&P Update referred to the surgeon/provider.    Ready For Surgery From Anesthesia Perspective.     .

## 2024-06-01 DIAGNOSIS — I10 ESSENTIAL HYPERTENSION: ICD-10-CM

## 2024-06-03 RX ORDER — METOPROLOL SUCCINATE 25 MG/1
TABLET, EXTENDED RELEASE ORAL
Qty: 90 TABLET | Refills: 0 | Status: SHIPPED | OUTPATIENT
Start: 2024-06-03

## 2024-06-03 NOTE — H&P
Short Stay Endoscopy History and Physical    PCP - Sakshi Rubio MD  Referring Physician - Sakshi Rubio MD  [address] [facility]  [place] [address]    Procedure - Colonoscopy  ASA - per anesthesia  Mallampati - per anesthesia  History of Anesthesia problems - no  Family history Anesthesia problems -  no   Plan of anesthesia - General    HPI  53 y.o. female  Reason for procedure:   Encounter for screening colonoscopy [Z12.11]         ROS:  Constitutional: No fevers, chills, No weight loss  CV: No chest pain  Pulm: No cough, No shortness of breath  GI: see HPI    Medical History:  has a past medical history of Allergy, Breast cyst, and High risk HPV infection.    Surgical History:  has a past surgical history that includes External ear surgery; turbanite; Breast cyst excision (Right); and Sinus surgery.    Family History: family history includes Allergies in her mother; Cancer in her paternal aunt, paternal grandfather, and paternal grandmother; Cancer (age of onset: 80) in her father; Depression in her brother..    Social History:  reports that she has never smoked. She has never been exposed to tobacco smoke. She has never used smokeless tobacco. She reports current alcohol use. She reports that she does not use drugs.    Review of patient's allergies indicates:   Allergen Reactions    Amoxicillin trihydrate Rash     Rash and hives     Erythromycin base Rash     Rash and hives     Omnipaque 140 [iohexol] Rash     Rash and hives     Penicillins Rash     Rash and hives     Tetracyclines Hives and Rash     TETRACYCLINE Hydrochloride     Clindamycin Hives       Medications:   Medications Prior to Admission   Medication Sig Dispense Refill Last Dose    acyclovir (ZOVIRAX) 400 MG tablet TAKE 1 TABLET BY MOUTH TWICE A DAY START AT ONSET OF SYMPTOMS. TAKE FOR 5 DAYS TOTAL 30 tablet 2     celecoxib (CELEBREX) 200 MG capsule Take 1 capsule (200 mg total) by mouth 2 (two) times daily as needed for Pain.  (Patient not taking: Reported on 7/26/2023) 30 capsule 6     desonide (DESOWEN) 0.05 % cream Apply topically 2 (two) times daily. Apply to affected areas of rash 60 g 3     diphenoxylate-atropine 2.5-0.025 mg (LOMOTIL) 2.5-0.025 mg per tablet Take 1 tablet by mouth 4 (four) times daily as needed for Diarrhea. 60 tablet 0     famotidine (PEPCID) 40 MG tablet Take 1 tablet (40 mg total) by mouth once daily. 90 tablet 3     fluticasone propionate (FLONASE) 50 mcg/actuation nasal spray 1 spray (50 mcg total) by Each Nostril route once daily. 48 mL 3     metoprolol succinate (TOPROL-XL) 25 MG 24 hr tablet TAKE 1 TABLET BY MOUTH EVERY DAY 90 tablet 0     norgestimate-ethinyl estradioL (TRI-LO-GIOVANNI) 0.18/0.215/0.25 mg-25 mcg tablet Take 1 tablet by mouth once daily. 84 tablet 1     promethazine-dextromethorphan (PROMETHAZINE-DM) 6.25-15 mg/5 mL Syrp Take 5 mLs by mouth every 6 (six) hours as needed (cough). 180 mL 0     triamcinolone acetonide 0.025% (KENALOG) 0.025 % cream Apply topically 2 (two) times daily. for 7 days 15 g 0        Physical Exam:    Vital Signs: There were no vitals filed for this visit.    General Appearance: Well appearing in no acute distress  Abdomen: Soft, non tender, non distended with normal bowel sounds, no masses    Labs:  Lab Results   Component Value Date    WBC 7.64 02/16/2024    HGB 13.4 02/16/2024    HCT 42.7 02/16/2024     02/16/2024    CHOL 157 02/16/2024    TRIG 123 02/16/2024    HDL 67 02/16/2024    ALT 11 02/16/2024    AST 18 02/16/2024     02/16/2024    K 4.4 02/16/2024     02/16/2024    CREATININE 0.7 02/16/2024    BUN 10 02/16/2024    CO2 21 (L) 02/16/2024    TSH 2.896 02/16/2024    HGBA1C 4.9 02/16/2024       I have explained the risks and benefits of this endoscopic procedure to the patient including but not limited to bleeding, inflammation, infection, perforation, and death.    Assessment/Plan:     CRC Screening     - Proceed with colonoscopy     Yemii  MD Wil  Gastroenterology   [facility]

## 2024-06-04 ENCOUNTER — HOSPITAL ENCOUNTER (OUTPATIENT)
Facility: HOSPITAL | Age: 54
Discharge: HOME | End: 2024-06-04
Attending: STUDENT IN AN ORGANIZED HEALTH CARE EDUCATION/TRAINING PROGRAM | Admitting: STUDENT IN AN ORGANIZED HEALTH CARE EDUCATION/TRAINING PROGRAM
Payer: COMMERCIAL

## 2024-06-04 ENCOUNTER — ANESTHESIA (OUTPATIENT)
Dept: ENDOSCOPY | Facility: HOSPITAL | Age: 54
End: 2024-06-04
Payer: COMMERCIAL

## 2024-06-04 VITALS
HEART RATE: 81 BPM | TEMPERATURE: 98 F | RESPIRATION RATE: 16 BRPM | OXYGEN SATURATION: 99 % | DIASTOLIC BLOOD PRESSURE: 66 MMHG | SYSTOLIC BLOOD PRESSURE: 138 MMHG

## 2024-06-04 DIAGNOSIS — Z12.11 COLON CANCER SCREENING: ICD-10-CM

## 2024-06-04 DIAGNOSIS — Z12.11 SCREEN FOR COLON CANCER: Primary | ICD-10-CM

## 2024-06-04 LAB
B-HCG UR QL: NEGATIVE
CTP QC/QA: YES

## 2024-06-04 PROCEDURE — 45385 COLONOSCOPY W/LESION REMOVAL: CPT | Mod: PT | Performed by: STUDENT IN AN ORGANIZED HEALTH CARE EDUCATION/TRAINING PROGRAM

## 2024-06-04 PROCEDURE — 37000009 HC ANESTHESIA EA ADD 15 MINS: Performed by: STUDENT IN AN ORGANIZED HEALTH CARE EDUCATION/TRAINING PROGRAM

## 2024-06-04 PROCEDURE — 81025 URINE PREGNANCY TEST: CPT | Performed by: STUDENT IN AN ORGANIZED HEALTH CARE EDUCATION/TRAINING PROGRAM

## 2024-06-04 PROCEDURE — 25000003 PHARM REV CODE 250: Performed by: NURSE ANESTHETIST, CERTIFIED REGISTERED

## 2024-06-04 PROCEDURE — 25000003 PHARM REV CODE 250: Performed by: STUDENT IN AN ORGANIZED HEALTH CARE EDUCATION/TRAINING PROGRAM

## 2024-06-04 PROCEDURE — 94761 N-INVAS EAR/PLS OXIMETRY MLT: CPT

## 2024-06-04 PROCEDURE — D9220A PRA ANESTHESIA: Mod: 33,,, | Performed by: NURSE ANESTHETIST, CERTIFIED REGISTERED

## 2024-06-04 PROCEDURE — 88305 TISSUE EXAM BY PATHOLOGIST: CPT | Mod: 26,,, | Performed by: PATHOLOGY

## 2024-06-04 PROCEDURE — 99900035 HC TECH TIME PER 15 MIN (STAT)

## 2024-06-04 PROCEDURE — 45385 COLONOSCOPY W/LESION REMOVAL: CPT | Mod: 33,,, | Performed by: STUDENT IN AN ORGANIZED HEALTH CARE EDUCATION/TRAINING PROGRAM

## 2024-06-04 PROCEDURE — 27200997: Performed by: STUDENT IN AN ORGANIZED HEALTH CARE EDUCATION/TRAINING PROGRAM

## 2024-06-04 PROCEDURE — 27201089 HC SNARE, DISP (ANY): Performed by: STUDENT IN AN ORGANIZED HEALTH CARE EDUCATION/TRAINING PROGRAM

## 2024-06-04 PROCEDURE — 88305 TISSUE EXAM BY PATHOLOGIST: CPT | Performed by: PATHOLOGY

## 2024-06-04 PROCEDURE — 37000008 HC ANESTHESIA 1ST 15 MINUTES: Performed by: STUDENT IN AN ORGANIZED HEALTH CARE EDUCATION/TRAINING PROGRAM

## 2024-06-04 PROCEDURE — 63600175 PHARM REV CODE 636 W HCPCS: Performed by: NURSE ANESTHETIST, CERTIFIED REGISTERED

## 2024-06-04 RX ORDER — DEXMEDETOMIDINE HYDROCHLORIDE 100 UG/ML
INJECTION, SOLUTION INTRAVENOUS
Status: DISCONTINUED | OUTPATIENT
Start: 2024-06-04 | End: 2024-06-07

## 2024-06-04 RX ORDER — PROPOFOL 10 MG/ML
VIAL (ML) INTRAVENOUS
Status: DISCONTINUED | OUTPATIENT
Start: 2024-06-04 | End: 2024-06-07

## 2024-06-04 RX ORDER — SODIUM CHLORIDE 9 MG/ML
INJECTION, SOLUTION INTRAVENOUS CONTINUOUS
Status: DISCONTINUED | OUTPATIENT
Start: 2024-06-04 | End: 2024-06-04 | Stop reason: HOSPADM

## 2024-06-04 RX ORDER — PROPOFOL 10 MG/ML
VIAL (ML) INTRAVENOUS CONTINUOUS PRN
Status: DISCONTINUED | OUTPATIENT
Start: 2024-06-04 | End: 2024-06-07

## 2024-06-04 RX ORDER — LIDOCAINE HYDROCHLORIDE 20 MG/ML
INJECTION INTRAVENOUS
Status: DISCONTINUED | OUTPATIENT
Start: 2024-06-04 | End: 2024-06-07

## 2024-06-04 RX ORDER — FENTANYL CITRATE 50 UG/ML
INJECTION, SOLUTION INTRAMUSCULAR; INTRAVENOUS
Status: DISCONTINUED | OUTPATIENT
Start: 2024-06-04 | End: 2024-06-07

## 2024-06-04 RX ORDER — NITROFURANTOIN 25; 75 MG/1; MG/1
100 CAPSULE ORAL
COMMUNITY

## 2024-06-04 RX ADMIN — SODIUM CHLORIDE: 0.9 INJECTION, SOLUTION INTRAVENOUS at 09:06

## 2024-06-04 RX ADMIN — LIDOCAINE HYDROCHLORIDE 50 MG: 20 INJECTION INTRAVENOUS at 09:06

## 2024-06-04 RX ADMIN — PROPOFOL 175 MCG/KG/MIN: 10 INJECTION, EMULSION INTRAVENOUS at 09:06

## 2024-06-04 RX ADMIN — PROPOFOL 90 MG: 10 INJECTION, EMULSION INTRAVENOUS at 09:06

## 2024-06-04 RX ADMIN — FENTANYL CITRATE 25 MCG: 50 INJECTION INTRAMUSCULAR; INTRAVENOUS at 09:06

## 2024-06-04 RX ADMIN — DEXMEDETOMIDINE 10 MCG: 200 INJECTION, SOLUTION INTRAVENOUS at 09:06

## 2024-06-04 NOTE — PROVATION PATIENT INSTRUCTIONS
Discharge Summary/Instructions after an Endoscopic Procedure  Patient Name: Dyan Wiley  Patient MRN: [id number]  Patient YOB: 1970 Tuesday, June 4, 2024  Yeimi De La Torre MD  Dear patient,  As a result of recent federal legislation (The Federal Cures Act), you may   receive lab or pathology results from your procedure in your [facility]   account before your physician is able to contact you. Your physician or   their representative will relay the results to you with their   recommendations at their soonest availability.  Thank you,  RESTRICTIONS:  During your procedure today, you received medications for sedation.  These   medications may affect your judgment, balance and coordination.  Therefore,   for 24 hours, you have the following restrictions:   - DO NOT drive a car, operate machinery, make legal/financial decisions,   sign important papers or drink alcohol.    ACTIVITY:  Today: no heavy lifting, straining or running due to procedural   sedation/anesthesia.  The following day: return to full activity including work.  DIET:  Eat and drink normally unless instructed otherwise.     TREATMENT FOR COMMON SIDE EFFECTS:  - Mild abdominal pain, nausea, belching, bloating or excessive gas:  rest,   eat lightly and use a heating pad.  - Sore Throat: treat with throat lozenges and/or gargle with warm salt   water.  - Because air was used during the procedure, expelling large amounts of air   from your rectum or belching is normal.  - If a bowel prep was taken, you may not have a bowel movement for 1-3 days.    This is normal.  SYMPTOMS TO WATCH FOR AND REPORT TO YOUR PHYSICIAN:  1. Abdominal pain or bloating, other than gas cramps.  2. Chest pain.  3. Back pain.  4. Signs of infection such as: chills or fever occurring within 24 hours   after the procedure.  5. Rectal bleeding, which would show as bright red, maroon, or black stools.   (A tablespoon of blood from the rectum is not serious, especially  if   hemorrhoids are present.)  6. Vomiting.  7. Weakness or dizziness.  GO DIRECTLY TO THE NEAREST EMERGENCY ROOM IF YOU HAVE ANY OF THE FOLLOWING:      Difficulty breathing              Chills and/or fever over 101 F   Persistent vomiting and/or vomiting blood   Severe abdominal pain   Severe chest pain   Black, tarry stools   Bleeding- more than one tablespoon   Any other symptom or condition that you feel may need urgent attention  Your doctor recommends these additional instructions:  If any biopsies were taken, your doctors clinic will contact you in 1 to 2   weeks with any results.  - Discharge patient to home (ambulatory).   - Resume regular diet.   - Continue present medications.   - Await pathology results.   - Repeat colonoscopy in 3 years for surveillance.  For questions, problems or results please call your physician - Yeimi De La Torre MD at Work:  [phone].  [facility], EMERGENCY ROOM PHONE NUMBER: [phone]  IF A COMPLICATION OR EMERGENCY SITUATION ARISES AND YOU ARE UNABLE TO REACH   YOUR PHYSICIAN - GO DIRECTLY TO THE EMERGENCY ROOM.  Yeimi De La Torre MD  6/4/2024 9:57:24 AM  This report has been verified and signed electronically.  Dear patient,  As a result of recent federal legislation (The Federal Cures Act), you may   receive lab or pathology results from your procedure in your [facility]   account before your physician is able to contact you. Your physician or   their representative will relay the results to you with their   recommendations at their soonest availability.  Thank you,  PROVATION

## 2024-06-07 NOTE — ANESTHESIA POSTPROCEDURE EVALUATION
Anesthesia Post Evaluation    Patient: Dyan Arredondo    Procedure(s) Performed: Procedure(s) (LRB):  COLONOSCOPY (N/A)    Final Anesthesia Type: general      Patient location during evaluation: PACU  Patient participation: Yes- Able to Participate  Level of consciousness: awake and alert  Post-procedure vital signs: reviewed and stable  Pain management: adequate  Airway patency: patent    PONV status at discharge: No PONV  Anesthetic complications: no      Cardiovascular status: stable  Respiratory status: spontaneous ventilation  Hydration status: euvolemic  Follow-up not needed.          Vitals Value Taken Time   /66 06/04/24 1018   Temp 36.6 °C (97.9 °F) 06/04/24 0956   Pulse 81 06/04/24 1021   Resp 17 06/04/24 1021   SpO2 100 % 06/04/24 1021   Vitals shown include unfiled device data.      Event Time   Out of Recovery 10:12:00         Pain/Gricelda Score: No data recorded

## 2024-06-11 LAB
FINAL PATHOLOGIC DIAGNOSIS: NORMAL
GROSS: NORMAL
Lab: NORMAL

## 2024-08-23 ENCOUNTER — PATIENT MESSAGE (OUTPATIENT)
Dept: ADMINISTRATIVE | Facility: HOSPITAL | Age: 54
End: 2024-08-23
Payer: COMMERCIAL

## 2024-08-29 DIAGNOSIS — I10 ESSENTIAL HYPERTENSION: ICD-10-CM

## 2024-08-29 RX ORDER — METOPROLOL SUCCINATE 25 MG/1
TABLET, EXTENDED RELEASE ORAL
Qty: 60 TABLET | Refills: 0 | Status: SHIPPED | OUTPATIENT
Start: 2024-08-29

## 2024-08-29 NOTE — TELEPHONE ENCOUNTER
Refill Routing Note   Medication(s) are not appropriate for processing by [facility] for the following reason(s):        Responsible provider unclear    [facility] action(s):  Route               Appointments  past 12m or future 3m with PCP    Date Provider   Last Visit   Visit date not found Ej Cortes DO   Next Visit   Visit date not found Ej Cortes,    ED visits in past 90 days: 0        Note composed:10:59 AM 08/29/2024

## 2024-10-03 ENCOUNTER — E-VISIT (OUTPATIENT)
Dept: FAMILY MEDICINE | Facility: CLINIC | Age: 54
End: 2024-10-03
Payer: COMMERCIAL

## 2024-10-03 DIAGNOSIS — Z30.9 ENCOUNTER FOR CONTRACEPTIVE MANAGEMENT, UNSPECIFIED TYPE: ICD-10-CM

## 2024-10-03 RX ORDER — NORGESTIMATE AND ETHINYL ESTRADIOL 7DAYSX3 LO
1 KIT ORAL DAILY
Qty: 84 TABLET | Refills: 1 | Status: SHIPPED | OUTPATIENT
Start: 2024-10-03

## 2024-10-04 ENCOUNTER — CLINICAL SUPPORT (OUTPATIENT)
Dept: OPHTHALMOLOGY | Facility: CLINIC | Age: 54
End: 2024-10-04
Payer: COMMERCIAL

## 2024-10-04 ENCOUNTER — OFFICE VISIT (OUTPATIENT)
Dept: OPHTHALMOLOGY | Facility: CLINIC | Age: 54
End: 2024-10-04
Payer: COMMERCIAL

## 2024-10-04 DIAGNOSIS — H43.823 VITREOMACULAR ADHESION OF BOTH EYES: ICD-10-CM

## 2024-10-04 DIAGNOSIS — H25.13 AGE-RELATED NUCLEAR CATARACT OF BOTH EYES: ICD-10-CM

## 2024-10-04 DIAGNOSIS — H31.001 CHORIORETINAL SCAR OF RIGHT EYE: Primary | ICD-10-CM

## 2024-10-04 PROCEDURE — 99999 PR PBB SHADOW E&M-EST. PATIENT-LVL III: CPT | Mod: PBBFAC,,, | Performed by: OPHTHALMOLOGY

## 2024-10-04 NOTE — PROGRESS NOTES
HPI    Pt sts she has a hole OD Pt also had pressure of 19 for a while and her   last visit her pressure was in the 20ies. A lot of floaters no flashes   Last edited by Florina Rubio on 10/4/2024  1:54 PM.         A/P    ICD-10-CM ICD-9-CM   1. Chorioretinal scar of right eye  H31.001 363.30   2. Vitreomacular adhesion of both eyes  H43.823 379.27   3. Age-related nuclear cataract of both eyes  H25.13 366.16       1. Chorioretinal scar of right eye  Here for retina eval and IOP check  Pt was told at outside office she had a hole in retina and elevated IOP    Exam notable for CR scar in periphery, no RT/RD with , no fluid  IOP ok today  Plan: Observation for now, counseled on findings, monitor with optom annual    2. Vitreomacular adhesion of both eyes  No RT/RD  Plan: Observation    Pathology of PVD, Retinal Tear, Retinal Detachment reviewed in great detail  RD precautions discussed in detail, patient expressed understanding  RTC immediately PRN (especially ANY change flashes, floaters, vision, visual field)     3. Age-related nuclear cataract of both eyes  Mild NS, NVS  Plan: Observation Update Mrx prn     RTC prn, optom annual        I saw and examined the patient and reviewed in detail the findings documented. The final examination findings, image interpretations which have been independently interpreted, and plan as documented in the record represent my personal judgment and conclusions.    Goran Bruce MD  Vitreoretinal Surgery   [facility]

## 2024-10-16 ENCOUNTER — OFFICE VISIT (OUTPATIENT)
Dept: DERMATOLOGY | Facility: CLINIC | Age: 54
End: 2024-10-16
Payer: COMMERCIAL

## 2024-10-16 DIAGNOSIS — D22.9 MULTIPLE BENIGN NEVI: ICD-10-CM

## 2024-10-16 DIAGNOSIS — L63.9 ALOPECIA AREATA: ICD-10-CM

## 2024-10-16 DIAGNOSIS — L82.1 SEBORRHEIC KERATOSES: ICD-10-CM

## 2024-10-16 DIAGNOSIS — Z12.83 SCREENING EXAM FOR SKIN CANCER: ICD-10-CM

## 2024-10-16 DIAGNOSIS — D23.9 DERMATOFIBROMA: ICD-10-CM

## 2024-10-16 DIAGNOSIS — D48.5 NEOPLASM OF UNCERTAIN BEHAVIOR OF SKIN: Primary | ICD-10-CM

## 2024-10-16 PROCEDURE — 99999 PR PBB SHADOW E&M-EST. PATIENT-LVL III: CPT | Mod: PBBFAC,,, | Performed by: STUDENT IN AN ORGANIZED HEALTH CARE EDUCATION/TRAINING PROGRAM

## 2024-10-16 NOTE — PROGRESS NOTES
Subjective:      Patient ID:  Dyan Arredondo is a 53 y.o. female who presents for   Chief Complaint   Patient presents with    Skin Check     UBSE     Dyan Arredondo is a 53 y.o. female who presents for: UBSE screening exam for skin cancer.    Last office visit 21 with Dr. Lane for UBSE.     The patient has the following lesions of concern:  Location: lesion on R shoulder  Duration: 6 mos changing  Symptoms: changing in color and size, crusting  Relieving factors/Previous treatments: none    Patient with new area of concern:   Location: white mole on side of face  Previous treatments: wants removed    Patient with new area of concern:   Location: bump on L forearm, recently turned red  Previous treatments: none    Patient with new area of concern:   Location: some veins on chins  Previous treatments: none    Patient with new area of concern:   Location: would like to discuss scalp micro pigmentation for hair loss  Previous treatments: none     Hair loss of scalp present x 1 yr,  recommended derm eval. Not itchy. No PMHx thyroid disease or vitiligo.     Pertinent history:  History of blistering sunburns: No  History of tanning bed use: Yes  Family history of melanoma: pt's brother is currently being treated for melanoma, her father  from skin cancer so she also thinks this was melanoma  Personal history of mole removal: Yes  Personal history of skin cancer: No         Review of Systems   Skin:  Positive for daily sunscreen use (Arms and Face daily) and activity-related sunscreen use. Negative for recent sunburn.   Hematologic/Lymphatic: Bruises/bleeds easily.       Objective:   Physical Exam   Constitutional: She appears well-developed and well-nourished. No distress.   Neurological: She is alert and oriented to person, place, and time. She is not disoriented.   Psychiatric: She has a normal mood and affect.   Skin:   Areas Examined (abnormalities noted in diagram):   Scalp / Hair Palpated and  Inspected  Head / Face Inspection Performed  Neck Inspection Performed  Chest / Axilla Inspection Performed  Abdomen Inspection Performed  Back Inspection Performed  RUE Inspected  LUE Inspection Performed                 Diagram Legend     Erythematous scaling macule/papule c/w actinic keratosis       Vascular papule c/w angioma      Pigmented verrucoid papule/plaque c/w seborrheic keratosis      Yellow umbilicated papule c/w sebaceous hyperplasia      Irregularly shaped tan macule c/w lentigo     1-2 mm smooth white papules consistent with Milia      Movable subcutaneous cyst with punctum c/w epidermal inclusion cyst      Subcutaneous movable cyst c/w pilar cyst      Firm pink to brown papule c/w dermatofibroma      Pedunculated fleshy papule(s) c/w skin tag(s)      Evenly pigmented macule c/w junctional nevus     Mildly variegated pigmented, slightly irregular-bordered macule c/w mildly atypical nevus      Flesh colored to evenly pigmented papule c/w intradermal nevus       Pink pearly papule/plaque c/w basal cell carcinoma      Erythematous hyperkeratotic cursted plaque c/w SCC      Surgical scar with no sign of skin cancer recurrence      Open and closed comedones      Inflammatory papules and pustules      Verrucoid papule consistent consistent with wart     Erythematous eczematous patches and plaques     Dystrophic onycholytic nail with subungual debris c/w onychomycosis     Umbilicated papule    Erythematous-base heme-crusted tan verrucoid plaque consistent with inflamed seborrheic keratosis     Erythematous Silvery Scaling Plaque c/w Psoriasis     See annotation              Assessment / Plan:      Pathology Orders:       Normal Orders This Visit    Specimen to Pathology, Dermatology     Comments:    Number of Specimens:->1  ------------------------->-------------------------  Spec 1 Procedure:->Biopsy  Spec 1 Clinical Impression:->1.3 cm pink scar-like plaque-  ddx bcc v keloid v other  Spec 1 Source:->jesse  shoulder  Release to patient->Immediate    Questions:    Procedure Type: Dermatology and skin neoplasms    Number of Specimens: 1    ------------------------: -------------------------    Spec 1 Procedure: Biopsy    Spec 1 Clinical Impression: 1.3 cm pink scar-like plaque- ddx bcc v keloid v other    Spec 1 Source: r shoulder    Release to patient: Immediate    Release to patient:           Neoplasm of uncertain behavior of skin  -     Specimen to Pathology, Dermatology  Shave biopsy procedure note:    Shave biopsy performed after verbal consent including risk of infection, scar, recurrence, need for additional treatment of site. Area prepped with alcohol, anesthetized with approximately 1.0cc of 1% lidocaine with epinephrine. Lesional tissue shaved with razor blade. Hemostasis achieved with application of aluminum chloride followed by hyfrecation. No complications. Dressing applied. Wound care explained.    Alopecia areata  Well-defined round patch hair loss with exclamation hairs on frontal scalp  Discussed autoimmune etiology of condition and tx options  Pt elects for serial ILK    Intralesional Kenalog 5 mg/cc (0.4 cc total) injected into 1 lesions on the frontal scalp today after obtaining verbal consent including risk of surrounding hypopigmentation. Patient tolerated procedure well.    Units: 1  NDC for Kenalog 10mg/cc:  [id number]    Dermatofibroma  This is a benign scar-like lesion secondary to minor trauma. No treatment required.     Seborrheic keratoses  These are benign inherited growths without a malignant potential. Reassurance given to patient. No treatment is necessary.   Treatment of benign, asymptomatic lesions is considered cosmetic  $150 up to 3 or $300 up to 15    Multiple benign nevi  Reassurance    Screening exam for skin cancer  Upper body skin examination performed today including at least 6 points as noted in physical examination. No lesions suspicious for malignancy noted.    Recommend  daily sun protection/avoidance and use of at least SPF 30, broad spectrum sunscreen (OTC drug).     RTC 6 weeks iLK

## 2024-10-18 ENCOUNTER — OFFICE VISIT (OUTPATIENT)
Dept: FAMILY MEDICINE | Facility: CLINIC | Age: 54
End: 2024-10-18
Payer: COMMERCIAL

## 2024-10-18 VITALS
WEIGHT: 230.38 LBS | HEIGHT: 68 IN | SYSTOLIC BLOOD PRESSURE: 130 MMHG | HEART RATE: 99 BPM | OXYGEN SATURATION: 100 % | BODY MASS INDEX: 34.92 KG/M2 | DIASTOLIC BLOOD PRESSURE: 84 MMHG

## 2024-10-18 DIAGNOSIS — Z30.9 ENCOUNTER FOR CONTRACEPTIVE MANAGEMENT, UNSPECIFIED TYPE: ICD-10-CM

## 2024-10-18 DIAGNOSIS — I10 ESSENTIAL HYPERTENSION: Primary | ICD-10-CM

## 2024-10-18 DIAGNOSIS — Z00.00 ANNUAL PHYSICAL EXAM: Primary | ICD-10-CM

## 2024-10-18 DIAGNOSIS — F10.10 ALCOHOL ABUSE: ICD-10-CM

## 2024-10-18 DIAGNOSIS — Z23 IMMUNIZATION DUE: ICD-10-CM

## 2024-10-18 DIAGNOSIS — N39.0 RECURRENT UTI: ICD-10-CM

## 2024-10-18 DIAGNOSIS — R21 RASH: ICD-10-CM

## 2024-10-18 DIAGNOSIS — B00.1 COLD SORE: ICD-10-CM

## 2024-10-18 PROCEDURE — 99999 PR PBB SHADOW E&M-EST. PATIENT-LVL III: CPT | Mod: PBBFAC,,, | Performed by: STUDENT IN AN ORGANIZED HEALTH CARE EDUCATION/TRAINING PROGRAM

## 2024-10-18 RX ORDER — ACYCLOVIR 400 MG/1
TABLET ORAL
Qty: 30 TABLET | Refills: 2 | Status: SHIPPED | OUTPATIENT
Start: 2024-10-18

## 2024-10-18 RX ORDER — NORGESTIMATE AND ETHINYL ESTRADIOL 7DAYSX3 LO
1 KIT ORAL DAILY
Qty: 90 TABLET | Refills: 3 | Status: SHIPPED | OUTPATIENT
Start: 2024-10-18 | End: 2025-10-18

## 2024-10-18 RX ORDER — FAMOTIDINE 20 MG/1
20 TABLET, FILM COATED ORAL 2 TIMES DAILY PRN
COMMUNITY

## 2024-10-18 RX ORDER — NITROFURANTOIN 25; 75 MG/1; MG/1
100 CAPSULE ORAL
Qty: 30 CAPSULE | Refills: 2 | Status: SHIPPED | OUTPATIENT
Start: 2024-10-18

## 2024-10-18 RX ORDER — FLUTICASONE PROPIONATE 50 MCG
1 SPRAY, SUSPENSION (ML) NASAL DAILY
COMMUNITY

## 2024-10-18 RX ORDER — DESONIDE 0.5 MG/G
CREAM TOPICAL 2 TIMES DAILY
Qty: 15 G | Refills: 1 | Status: SHIPPED | OUTPATIENT
Start: 2024-10-18

## 2024-10-18 RX ORDER — METOPROLOL SUCCINATE 25 MG/1
25 TABLET, EXTENDED RELEASE ORAL DAILY
Qty: 90 TABLET | Refills: 2 | Status: SHIPPED | OUTPATIENT
Start: 2024-10-18

## 2024-10-18 NOTE — PROGRESS NOTES
History & Physical  [facility]      SUBJECTIVE:     History of Present Illness:  Patient is a 53 y.o. female presents to clinic to establish care. Current diagnoses include HTN, high risk HPV, alcohol abuse, cold sores. Established with derm, ophthalmology.     HTN: Currently taking metoprolol 25mg daily.     OCP: Still has periods regularly. Currently on OCP for management. No FmHx breast cancer. No blood clot history.     Rash: Using desonide as needed.     Using macrobid ppx 100mg once after intercourse.     EtOH: Drinking heavily for 10 years. Has stopped drinking intermittently without any withdrawal. Drinks wine with dinner and prepping dinner. Doesn't get drunk. Will drink 3 glasses of wine a night.     Will get cold sores every 4 years or so. Acyclovir works well when flare occurs.       Review of patient's allergies indicates:   Allergen Reactions    Amoxicillin trihydrate Rash     Rash and hives     Erythromycin base Rash     Rash and hives     Omnipaque 140 [iohexol] Rash     Rash and hives     Penicillins Rash     Rash and hives     Tetracyclines Hives and Rash     TETRACYCLINE Hydrochloride     Clindamycin Hives       Past Medical History:   Diagnosis Date    Allergy     Breast cyst     High risk HPV infection      Past Surgical History:   Procedure Laterality Date    BREAST CYST EXCISION Right     COLONOSCOPY N/A 6/4/2024    Procedure: COLONOSCOPY;  Surgeon: Yeimi De La Torre MD;  Location: [facility] END[facility];  Service: Endoscopy;  Laterality: N/A;  Referral:Sakshi Rubio MD / [facility] / [facility] portal - LW  5/27/24- pc complete. DBM    EXTERNAL EAR SURGERY      SINUS SURGERY      turbanite       Family History   Problem Relation Name Age of Onset    Allergies Mother      Cancer Father Vera 80        melanoma    Depression Brother      Cancer Paternal Aunt Dyan         lung CA +smoker ?    Cancer Paternal Grandmother EG         dx age 50's? +smoker (possibly)     "Cancer Paternal Grandfather Vera Sr         lung CA dx age 50's +smoker (possibly)    Breast cancer Neg Hx      Colon cancer Neg Hx      Ovarian cancer Neg Hx       Social History     Tobacco Use    Smoking status: Never     Passive exposure: Never    Smokeless tobacco: Never   Substance Use Topics    Alcohol use: Yes    Drug use: No        OBJECTIVE:     Vital Signs (Most Recent)  Vitals:    10/18/24 1059   BP: 130/84   BP Location: Right arm   Patient Position: Sitting   Pulse: 99   SpO2: 100%   Weight: 104.5 kg (230 lb 6.1 oz)   Height: 5' 8" (1.727 m)     BMI: 35.03    Physical Exam:  Gen: No apparent distress, well nourished and developed, appears stated age  CV: RRR, S1 and S2 present, no LE edema  Resp: CTAB, normal respiratory effort  Psych: Logical thought process, answers questions appropriately      ASSESSMENT/PLAN:   53 y.o.female presents to clinic to establish care.     1. Essential hypertension (Primary)  Well controlled on current regimen.  Discussed this may not have significant benefit for BP and can trial off of this in the future if she would like. .ajpaln   - metoprolol succinate (TOPROL-XL) 25 MG 24 hr tablet; Take 1 tablet (25 mg total) by mouth once daily.  Dispense: 90 tablet; Refill: 2    2. Encounter for contraceptive management, unspecified type  Well controlled on current regimen.    - norgestimate-ethinyl estradioL (TRI-LO-GIOVANNI) 0.18/0.215/0.25 mg-25 mcg tablet; Take 1 tablet by mouth once daily.  Dispense: 90 tablet; Refill: 3    3. Cold sore  Well controlled on current regimen.    - acyclovir (ZOVIRAX) 400 MG tablet; TAKE 1 TABLET BY MOUTH TWICE A DAY START AT ONSET OF SYMPTOMS. TAKE FOR 5 DAYS TOTAL  Dispense: 30 tablet; Refill: 2    4. Rash  Well controlled on current regimen.    - desonide (DESOWEN) 0.05 % cream; Apply topically 2 (two) times daily. Apply to affected areas of rash  Dispense: 15 g; Refill: 1    5. Recurrent UTI  Well controlled on current regimen.    - " nitrofurantoin, macrocrystal-monohydrate, (MACROBID) 100 MG capsule; Take 1 capsule (100 mg total) by mouth as needed (after intercourse).  Dispense: 30 capsule; Refill: 2    6. Immunization due  Agreeable  - influenza (Flulaval, Fluzone, Fluarix) 45 mcg/0.5 mL IM vaccine (> or = 6 mo) 0.5 mL     Follow-up: March for annual exam      Ej Cortes DO  Family Medicine

## 2024-11-25 ENCOUNTER — OFFICE VISIT (OUTPATIENT)
Dept: DERMATOLOGY | Facility: CLINIC | Age: 54
End: 2024-11-25
Payer: COMMERCIAL

## 2024-11-25 DIAGNOSIS — I78.1 TELANGIECTASIAS: ICD-10-CM

## 2024-11-25 DIAGNOSIS — D23.9 DERMATOFIBROMA: ICD-10-CM

## 2024-11-25 DIAGNOSIS — L81.4 LENTIGO: ICD-10-CM

## 2024-11-25 DIAGNOSIS — L66.10 LICHEN PLANOPILARIS: Primary | ICD-10-CM

## 2024-11-25 PROCEDURE — 99999 PR PBB SHADOW E&M-EST. PATIENT-LVL III: CPT | Mod: PBBFAC,,, | Performed by: STUDENT IN AN ORGANIZED HEALTH CARE EDUCATION/TRAINING PROGRAM

## 2024-11-25 RX ORDER — CLOBETASOL PROPIONATE 0.5 MG/ML
SOLUTION TOPICAL
Qty: 50 ML | Refills: 5 | Status: SHIPPED | OUTPATIENT
Start: 2024-11-25

## 2024-11-25 NOTE — PROGRESS NOTES
Subjective:      Patient ID:  Dyan Arredondo is a 54 y.o. female who presents for   Chief Complaint   Patient presents with    Follow-up     [facility]  with me for FBSE and ILK to alopecia of scalp and biopsy of R shoulder which showed superficial portions of DF    Hair loss  ILK last visit w/o much improvement  Scalp is itchy   Present 1 + year  Current tx: ILK    2. Vascular  Blood vessels on chin- interested in removal    3. DF  R shoulder- pt desires removal    4. Skin lesion  L temple  Was a brown sun spot but got more red    Review of Systems    Objective:   Physical Exam     Diagram Legend     Erythematous scaling macule/papule c/w actinic keratosis       Vascular papule c/w angioma      Pigmented verrucoid papule/plaque c/w seborrheic keratosis      Yellow umbilicated papule c/w sebaceous hyperplasia      Irregularly shaped tan macule c/w lentigo     1-2 mm smooth white papules consistent with Milia      Movable subcutaneous cyst with punctum c/w epidermal inclusion cyst      Subcutaneous movable cyst c/w pilar cyst      Firm pink to brown papule c/w dermatofibroma      Pedunculated fleshy papule(s) c/w skin tag(s)      Evenly pigmented macule c/w junctional nevus     Mildly variegated pigmented, slightly irregular-bordered macule c/w mildly atypical nevus      Flesh colored to evenly pigmented papule c/w intradermal nevus       Pink pearly papule/plaque c/w basal cell carcinoma      Erythematous hyperkeratotic cursted plaque c/w SCC      Surgical scar with no sign of skin cancer recurrence      Open and closed comedones      Inflammatory papules and pustules      Verrucoid papule consistent consistent with wart     Erythematous eczematous patches and plaques     Dystrophic onycholytic nail with subungual debris c/w onychomycosis     Umbilicated papule    Erythematous-base heme-crusted tan verrucoid plaque consistent with inflamed seborrheic keratosis     Erythematous Silvery Scaling Plaque c/w  Psoriasis     See annotation                      Assessment / Plan:        Lichen planopilaris  Examination of scalp today with patchy hair loss throughout mid frontal/vertex scalp with background erythema and tight perifollicular scale under dermoscopy most c/w LPP    I discussed suspect this may be LPP. Offered bx to confirm or can treat empirically first. Pt defers biopsy today.  Discussed this is a type of scarring alopecia that is often slowly progressive with time. Primary goal of treatment is to prevent worsening, secondary goal regrowth. Treatments take 6 months to see if it is working    Discussed clobetasol soln, ILK, plaquenil, doxy (pt allergic), LDOM, finasteride. Pt says she is allergic to may pills prefers to start with topicals/ILK    Plan:  Start clobetasol Monday-Friday, weekends off  Start minoxidil 5% liquid or foam (generic version of Rogaine mens strength) daily - cheapest at [employer] in bulk  Recommend to check ferritin, vit D 25 hydroxy next bloodwork    Intralesional Kenalog 5 mg/cc (1 cc total) injected into <7 lesions on the scalp today after obtaining verbal consent including risk of surrounding hypopigmentation. Patient tolerated procedure well.    Units: 1  NDC for Kenalog 10mg/cc:  8427-2814-51    Telangiectasias  Chin with ropey telangiectasias  Refer Dr. Mendiola    Dermatofibroma  Pt interested in removal since it is growing  Will schedule for excision of > 1 cm DF of R shoulder. Reviewed excision may leave another keloid or scar    Lentigo  This is a benign hyperpigmented sun induced lesion. Recommend daily sun protection/avoidance and use of at least SPF 30, broad spectrum sunscreen (OTC drug) will reduce the number of new lesions. Treatment of these benign lesions are considered cosmetic.    RTC 3 month hair loss, skin tag removal visit, and DF removal visit

## 2024-11-25 NOTE — PATIENT INSTRUCTIONS
Lichen planopilaris    Plan:  Start clobetasol Monday-Friday, weekends off  Start minoxidil 5% liquid or foam (generic version of Rogaine mens strength) daily - cheapest at [employer] in bulk

## 2024-12-22 ENCOUNTER — PATIENT MESSAGE (OUTPATIENT)
Dept: FAMILY MEDICINE | Facility: CLINIC | Age: 54
End: 2024-12-22
Payer: COMMERCIAL

## 2024-12-23 DIAGNOSIS — R10.9 ABDOMINAL PAIN, UNSPECIFIED ABDOMINAL LOCATION: ICD-10-CM

## 2024-12-23 RX ORDER — DIPHENOXYLATE HYDROCHLORIDE AND ATROPINE SULFATE 2.5; .025 MG/1; MG/1
1 TABLET ORAL 4 TIMES DAILY PRN
Qty: 60 TABLET | Refills: 0 | Status: SHIPPED | OUTPATIENT
Start: 2024-12-23

## 2025-02-20 ENCOUNTER — PROCEDURE VISIT (OUTPATIENT)
Dept: DERMATOLOGY | Facility: CLINIC | Age: 55
End: 2025-02-20
Payer: COMMERCIAL

## 2025-02-20 DIAGNOSIS — L66.10 LICHEN PLANOPILARIS: Primary | ICD-10-CM

## 2025-02-20 DIAGNOSIS — L64.9 ANDROGENETIC ALOPECIA: ICD-10-CM

## 2025-02-20 DIAGNOSIS — L63.9 ALOPECIA AREATA: ICD-10-CM

## 2025-02-20 RX ORDER — SPIRONOLACTONE 50 MG/1
TABLET, FILM COATED ORAL
Qty: 30 TABLET | Refills: 6 | Status: SHIPPED | OUTPATIENT
Start: 2025-02-20

## 2025-02-20 NOTE — PROGRESS NOTES
Subjective:      Patient ID:  Dyan Arredondo is a 54 y.o. female who presents for No chief complaint on file.    Patient here for desired excision of DF on R upper back/posterior shoulder. We discussed risks/benefits of procedure including light activity for 1-2 weeks no lifting heavier than milk gallon. She has [place] plans and wishes to reschedule procedure. She had hair loss visit follow up tomorrow, which we performed today.    LV  with me for LPP and other, here today for hair loss follow up    1. Hair loss- multifactorial- possible component of LPP and AA have not performed biopsy  Current tx: serial ILK (x 2) clobetasol solution Monday-Friday, weekends off; Rogaine foam 5% x 2.5 months, Anti-thinning shampoo and conditioner.   Noticing potential worsening since last visit but itching has improved  Hair loss present x 1 year    Pertinent dermatologic history:  History of blistering sunburns: No  History of tanning bed use: Yes  Family history of melanoma: pt's brother is currently being treated for melanoma, her father  from skin cancer so she also thinks this was melanoma  Personal history of mole removal: Yes  Personal history of skin cancer: No           Review of Systems    Objective:   Physical Exam   Skin:               Diagram Legend     Erythematous scaling macule/papule c/w actinic keratosis       Vascular papule c/w angioma      Pigmented verrucoid papule/plaque c/w seborrheic keratosis      Yellow umbilicated papule c/w sebaceous hyperplasia      Irregularly shaped tan macule c/w lentigo     1-2 mm smooth white papules consistent with Milia      Movable subcutaneous cyst with punctum c/w epidermal inclusion cyst      Subcutaneous movable cyst c/w pilar cyst      Firm pink to brown papule c/w dermatofibroma      Pedunculated fleshy papule(s) c/w skin tag(s)      Evenly pigmented macule c/w junctional nevus     Mildly variegated pigmented, slightly irregular-bordered macule c/w  mildly atypical nevus      Flesh colored to evenly pigmented papule c/w intradermal nevus       Pink pearly papule/plaque c/w basal cell carcinoma      Erythematous hyperkeratotic cursted plaque c/w SCC      Surgical scar with no sign of skin cancer recurrence      Open and closed comedones      Inflammatory papules and pustules      Verrucoid papule consistent consistent with wart     Erythematous eczematous patches and plaques     Dystrophic onycholytic nail with subungual debris c/w onychomycosis     Umbilicated papule    Erythematous-base heme-crusted tan verrucoid plaque consistent with inflamed seborrheic keratosis     Erythematous Silvery Scaling Plaque c/w Psoriasis     See annotation                      Assessment / Plan:        Lichen planopilaris  Alopecia areata  Androgenetic alopecia  Status: chronic condition flaring and/or not at treatment goal  Her hair thinning is multifactorial   Examination of scalp today with patchy hair loss throughout mid frontal/vertex scalp with background erythema and tight perifollicular scale under dermoscopy possibly c/w LPP  Also round patch of L frontal scalp possibly c/w AA, some hair regrowth in this patch so favor non-scarring  Mild miniaturization of follicles c/w AGA     I discussed suspect this may be LPP or inflammatory type of hair loss. Offered bx to confirm (would perform x 2) or can treat empirically first. Pt defers biopsy for now and would prefer empiric tx. We would biopsy before escalating to HCQ or for worsening.  Discussed that alopecia is typically slowly progressive with time. Primary goal of treatment is to prevent worsening, secondary goal regrowth. Treatments take 6-12 months consistent use to see if it is working     Discussed clobetasol soln, ILK, plaquenil, doxy (pt allergic to many medicines and abx), LDOM, finasteride, spironolactone. Pt says she is allergic to may pills prefers to start with topicals/ILK. Pt is pre-menopausal       Plan:  Continue clobetasol Monday-Friday, weekends off  Continue minoxidil 5% liquid or foam (generic version of Rogaine mens strength) daily - cheapest at [employer] in bulk  Start spironolactone 50 mg QD    Discussed benefits and risks of therapy including but not limited to breakthrough bleeding, breast tenderness, and elevated potassium levels which may give symptoms of fatigue, palpitations, and nausea. Patient should limit potassium intake - avoid potassium supplements or salt substitutes, limit bananas and citrus fruits. Pregnancy must be avoided while taking spironolactone.     Intralesional Kenalog 5 mg/cc (2 cc total) injected into > 8 lesions on the scalp today (frontal, vertex) after obtaining verbal consent including risk of surrounding hypopigmentation. Patient tolerated procedure well.     Units: 1  NDC for Kenalog 10mg/cc:  7555-5787-07    RTC 3 mo for hair loss follow up

## 2025-03-07 ENCOUNTER — RESULTS FOLLOW-UP (OUTPATIENT)
Dept: FAMILY MEDICINE | Facility: CLINIC | Age: 55
End: 2025-03-07

## 2025-03-07 ENCOUNTER — LAB VISIT (OUTPATIENT)
Dept: LAB | Facility: HOSPITAL | Age: 55
End: 2025-03-07
Attending: STUDENT IN AN ORGANIZED HEALTH CARE EDUCATION/TRAINING PROGRAM
Payer: COMMERCIAL

## 2025-03-07 DIAGNOSIS — F10.10 ALCOHOL ABUSE: ICD-10-CM

## 2025-03-07 DIAGNOSIS — Z00.00 ANNUAL PHYSICAL EXAM: ICD-10-CM

## 2025-03-07 LAB
ALBUMIN SERPL BCP-MCNC: 4 G/DL (ref 3.5–5.2)
ALP SERPL-CCNC: 53 U/L (ref 40–150)
ALT SERPL W/O P-5'-P-CCNC: 11 U/L (ref 10–44)
ANION GAP SERPL CALC-SCNC: 10 MMOL/L (ref 8–16)
AST SERPL-CCNC: 25 U/L (ref 10–40)
BASOPHILS # BLD AUTO: 0.11 K/UL (ref 0–0.2)
BASOPHILS NFR BLD: 1.1 % (ref 0–1.9)
BILIRUB SERPL-MCNC: 0.5 MG/DL (ref 0.1–1)
BUN SERPL-MCNC: 8 MG/DL (ref 6–20)
CALCIUM SERPL-MCNC: 9.3 MG/DL (ref 8.7–10.5)
CHLORIDE SERPL-SCNC: 105 MMOL/L (ref 95–110)
CHOLEST SERPL-MCNC: 158 MG/DL (ref 120–199)
CHOLEST/HDLC SERPL: 2.3 {RATIO} (ref 2–5)
CO2 SERPL-SCNC: 24 MMOL/L (ref 23–29)
CREAT SERPL-MCNC: 0.7 MG/DL (ref 0.5–1.4)
DIFFERENTIAL METHOD BLD: NORMAL
EOSINOPHIL # BLD AUTO: 0.2 K/UL (ref 0–0.5)
EOSINOPHIL NFR BLD: 1.8 % (ref 0–8)
ERYTHROCYTE [DISTWIDTH] IN BLOOD BY AUTOMATED COUNT: 12.2 % (ref 11.5–14.5)
EST. GFR  (NO RACE VARIABLE): >60 ML/MIN/1.73 M^2
ESTIMATED AVG GLUCOSE: 97 MG/DL (ref 68–131)
FOLATE SERPL-MCNC: 15.4 NG/ML (ref 4–24)
GLUCOSE SERPL-MCNC: 94 MG/DL (ref 70–110)
HBA1C MFR BLD: 5 % (ref 4–5.6)
HCT VFR BLD AUTO: 42.8 % (ref 37–48.5)
HDLC SERPL-MCNC: 69 MG/DL (ref 40–75)
HDLC SERPL: 43.7 % (ref 20–50)
HGB BLD-MCNC: 13.7 G/DL (ref 12–16)
IMM GRANULOCYTES # BLD AUTO: 0.03 K/UL (ref 0–0.04)
IMM GRANULOCYTES NFR BLD AUTO: 0.3 % (ref 0–0.5)
INR PPP: 0.9 (ref 0.8–1.2)
LDLC SERPL CALC-MCNC: 72.2 MG/DL (ref 63–159)
LYMPHOCYTES # BLD AUTO: 2.6 K/UL (ref 1–4.8)
LYMPHOCYTES NFR BLD: 26.5 % (ref 18–48)
MCH RBC QN AUTO: 30 PG (ref 27–31)
MCHC RBC AUTO-ENTMCNC: 32 G/DL (ref 32–36)
MCV RBC AUTO: 94 FL (ref 82–98)
MONOCYTES # BLD AUTO: 0.6 K/UL (ref 0.3–1)
MONOCYTES NFR BLD: 5.7 % (ref 4–15)
NEUTROPHILS # BLD AUTO: 6.2 K/UL (ref 1.8–7.7)
NEUTROPHILS NFR BLD: 64.6 % (ref 38–73)
NONHDLC SERPL-MCNC: 89 MG/DL
NRBC BLD-RTO: 0 /100 WBC
PLATELET # BLD AUTO: 305 K/UL (ref 150–450)
PMV BLD AUTO: 10.5 FL (ref 9.2–12.9)
POTASSIUM SERPL-SCNC: 4.7 MMOL/L (ref 3.5–5.1)
PROT SERPL-MCNC: 7.8 G/DL (ref 6–8.4)
PROTHROMBIN TIME: 10.3 SEC (ref 9–12.5)
RBC # BLD AUTO: 4.56 M/UL (ref 4–5.4)
SODIUM SERPL-SCNC: 139 MMOL/L (ref 136–145)
TRIGL SERPL-MCNC: 84 MG/DL (ref 30–150)
TSH SERPL DL<=0.005 MIU/L-ACNC: 1.78 UIU/ML (ref 0.4–4)
VIT B12 SERPL-MCNC: 428 PG/ML (ref 210–950)
WBC # BLD AUTO: 9.65 K/UL (ref 3.9–12.7)

## 2025-03-07 PROCEDURE — 85610 PROTHROMBIN TIME: CPT | Performed by: STUDENT IN AN ORGANIZED HEALTH CARE EDUCATION/TRAINING PROGRAM

## 2025-03-07 PROCEDURE — 84443 ASSAY THYROID STIM HORMONE: CPT | Performed by: STUDENT IN AN ORGANIZED HEALTH CARE EDUCATION/TRAINING PROGRAM

## 2025-03-07 PROCEDURE — 80053 COMPREHEN METABOLIC PANEL: CPT | Performed by: STUDENT IN AN ORGANIZED HEALTH CARE EDUCATION/TRAINING PROGRAM

## 2025-03-07 PROCEDURE — 82746 ASSAY OF FOLIC ACID SERUM: CPT | Performed by: STUDENT IN AN ORGANIZED HEALTH CARE EDUCATION/TRAINING PROGRAM

## 2025-03-07 PROCEDURE — 85025 COMPLETE CBC W/AUTO DIFF WBC: CPT | Performed by: STUDENT IN AN ORGANIZED HEALTH CARE EDUCATION/TRAINING PROGRAM

## 2025-03-07 PROCEDURE — 82607 VITAMIN B-12: CPT | Performed by: STUDENT IN AN ORGANIZED HEALTH CARE EDUCATION/TRAINING PROGRAM

## 2025-03-07 PROCEDURE — 80061 LIPID PANEL: CPT | Performed by: STUDENT IN AN ORGANIZED HEALTH CARE EDUCATION/TRAINING PROGRAM

## 2025-03-07 PROCEDURE — 36415 COLL VENOUS BLD VENIPUNCTURE: CPT | Mod: PO | Performed by: STUDENT IN AN ORGANIZED HEALTH CARE EDUCATION/TRAINING PROGRAM

## 2025-03-07 PROCEDURE — 83036 HEMOGLOBIN GLYCOSYLATED A1C: CPT | Performed by: STUDENT IN AN ORGANIZED HEALTH CARE EDUCATION/TRAINING PROGRAM

## 2025-03-13 ENCOUNTER — OFFICE VISIT (OUTPATIENT)
Dept: FAMILY MEDICINE | Facility: CLINIC | Age: 55
End: 2025-03-13
Payer: COMMERCIAL

## 2025-03-13 VITALS
SYSTOLIC BLOOD PRESSURE: 130 MMHG | WEIGHT: 223.31 LBS | HEIGHT: 68 IN | HEART RATE: 102 BPM | DIASTOLIC BLOOD PRESSURE: 80 MMHG | BODY MASS INDEX: 33.84 KG/M2 | OXYGEN SATURATION: 100 %

## 2025-03-13 DIAGNOSIS — Z12.31 ENCOUNTER FOR SCREENING MAMMOGRAM FOR MALIGNANT NEOPLASM OF BREAST: ICD-10-CM

## 2025-03-13 DIAGNOSIS — R19.7 DIARRHEA, UNSPECIFIED TYPE: ICD-10-CM

## 2025-03-13 DIAGNOSIS — J06.9 UPPER RESPIRATORY TRACT INFECTION, UNSPECIFIED TYPE: Primary | ICD-10-CM

## 2025-03-13 LAB
CTP QC/QA: YES
POC MOLECULAR INFLUENZA A AGN: NEGATIVE
POC MOLECULAR INFLUENZA B AGN: NEGATIVE
S PYO RRNA THROAT QL PROBE: NEGATIVE
SARS-COV-2 RDRP RESP QL NAA+PROBE: NEGATIVE

## 2025-03-13 PROCEDURE — 99999 PR PBB SHADOW E&M-EST. PATIENT-LVL IV: CPT | Mod: PBBFAC,,, | Performed by: STUDENT IN AN ORGANIZED HEALTH CARE EDUCATION/TRAINING PROGRAM

## 2025-03-13 RX ORDER — DIPHENOXYLATE HYDROCHLORIDE AND ATROPINE SULFATE 2.5; .025 MG/1; MG/1
1 TABLET ORAL 4 TIMES DAILY PRN
Qty: 60 TABLET | Refills: 2 | Status: SHIPPED | OUTPATIENT
Start: 2025-03-13

## 2025-03-13 NOTE — PROGRESS NOTES
Progress Note  [facility]    Subjective:     Dyan Arredondo is a 54 y.o. year old female with current diagnoses of  HTN, high risk HPV, alcohol abuse, cold sores who presents to clinic for URI.     URI: Started feeling poorly within the last day. Has mild cough. Congestion and sore throat. No ear pain or headaches. No fever.     Chronic diarrhea: Happens intermittently. Well controlled with lomotil PRN.     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    Vitreomacular adhesion of both eyes 10/04/2024    Age-related nuclear cataract of both eyes 10/04/2024    Alcohol abuse 03/28/2023    Essential hypertension 03/28/2023    Severe obesity (BMI 35.0-35.9 with comorbidity) 03/28/2023    Decreased range of motion (ROM) of right knee 01/12/2023    Decreased strength of lower extremity 01/12/2023    High risk HPV infection 12/18/2020    Numbness of foot 05/01/2018    GERD (gastroesophageal reflux disease) 04/04/2018    Cold sore 04/04/2018        Review of patient's allergies indicates:   Allergen Reactions    Amoxicillin trihydrate Rash     Rash and hives     Erythromycin base Rash     Rash and hives     Omnipaque 140 [iohexol] Rash     Rash and hives     Penicillins Rash     Rash and hives     Tetracyclines Hives and Rash     TETRACYCLINE Hydrochloride     Clindamycin Hives        Past Medical History:   Diagnosis Date    Allergy     Breast cyst     High risk HPV infection       Past Surgical History:   Procedure Laterality Date    BREAST CYST EXCISION Right     COLONOSCOPY N/A 6/4/2024    Procedure: COLONOSCOPY;  Surgeon: Yeimi De La Torre MD;  [facility] END[facility]COPY;  Service: Endoscopy;  Laterality: N/A;  Referral:Sakshi Rubio MD / [facility] / [facility] - LW  5/27/24- pc complete. DBM    EXTERNAL EAR SURGERY      SINUS SURGERY      turbanite        Family History   Problem Relation  "Name Age of Onset    Allergies Mother      Cancer Father Vera Patton 80        melanoma    Depression Brother Salomón Patton     Cancer Paternal Aunt Dyan         lung CA +smoker ?    Cancer Paternal Grandmother EG         dx age 50's? +smoker (possibly)    Cancer Paternal Grandfather Vera Lynn         lung CA dx age 50's +smoker (possibly)    Breast cancer Neg Hx      Colon cancer Neg Hx      Ovarian cancer Neg Hx        Social History     Tobacco Use    Smoking status: Never     Passive exposure: Never    Smokeless tobacco: Never   Substance Use Topics    Alcohol use: Yes     Alcohol/week: 20.0 standard drinks of alcohol     Types: 20 Glasses of wine per week        Objective:     Vitals:    03/13/25 0955   BP: 130/80   BP Location: Right arm   Patient Position: Sitting   Pulse: 102   SpO2: 100%   Weight: 101.3 kg (223 lb 5.2 oz)   Height: 5' 8" (1.727 m)     BMI: 33.96    Gen: No apparent distress, well nourished and developed, appears stated age  CV: RRR, S1 and S2 present, no LE edema  Resp: CTAB, normal respiratory effort, dry cough appreciated  HEENT: NCAT, TM clear b/l, oropharynx mucosa mildly erythematous without tonsil exudates   Neck: No LAD    POCT Flu: Negative   POCT COVID: Negative   POCT strep: Negative     Assessment/Plan:     Dyan Arredondo is a 54 y.o. year old female who presents to clinic for URI    1. Upper respiratory tract infection, unspecified type (Primary)  Supportive care discussed. RTC precautions provided. Medication, side effects and proper use discussed. Pt verbalized understanding and was in agreement with the plan.    - POCT Influenza A/B Molecular  - POCT COVID-19 Rapid Screening  - POCT Rapid Strep A  - promethazine (PHENERGAN) 1.25 mg/mL Syrp; Take 10 mLs (12.5 mg total) by mouth every 6 (six) hours as needed (Cough).  Dispense: 473 mL; Refill: 0    2. Encounter for screening mammogram for malignant neoplasm of breast  Due for screen, agreeable.   - Mammo Digital Screening " Chanelle w/ [facility]yaan (XPD); Future    3. Diarrhea, unspecified type  Well controlled on current regimen.  LA  reviewed.   - diphenoxylate-atropine 2.5-0.025 mg (LOMOTIL) 2.5-0.025 mg per tablet; Take 1 tablet by mouth 4 (four) times daily as needed for Diarrhea.  Dispense: 60 tablet; Refill: 2       Follow-up: 2 months for annual exam      Ej Cortes DO  Family Medicine

## 2025-03-14 ENCOUNTER — PROCEDURE VISIT (OUTPATIENT)
Dept: DERMATOLOGY | Facility: CLINIC | Age: 55
End: 2025-03-14

## 2025-03-14 DIAGNOSIS — Z41.1 ENCOUNTER FOR COSMETIC PROCEDURE: Primary | ICD-10-CM

## 2025-03-14 NOTE — PROGRESS NOTES
Cosmetic procedure visit    Skin tags (BL Upper medial thigh, R axilla) and SK (R thigh, R cheek)  Dilated pore of ba mid upper back extracted    Verbal consent obtained. 7 lesions removed with scissor snip removal after anesthesia with 1% lidocaine with epinephrine. Hemostasis achieved with aluminum chloride and hyfrecation. No complications.    Cryosurgery procedure note:    Verbal consent from the patient is obtained including, but not limited to, risk of hypopigmentation/hyperpigmentation, scar, recurrence of lesion. Liquid nitrogen cryosurgery is applied to 6 lesions to produce a freeze injury. The patient is aware that blisters may form and is instructed on wound care with gentle cleansing and use of vaseline ointment to keep moist until healed. The patient is supplied a handout on cryosurgery and is instructed to call if lesions do not completely resolve.    Cost: $300- pt paid at time of visit

## 2025-03-18 ENCOUNTER — TELEPHONE (OUTPATIENT)
Dept: DERMATOLOGY | Facility: CLINIC | Age: 55
End: 2025-03-18
Payer: COMMERCIAL

## 2025-03-18 NOTE — TELEPHONE ENCOUNTER
I spoke with Mrs. Arredondo and informed her that I rescheduled her procedure appointment to 04/10/2025 at 1 PM.  She thanked me for the call.

## 2025-03-26 ENCOUNTER — PATIENT MESSAGE (OUTPATIENT)
Dept: FAMILY MEDICINE | Facility: CLINIC | Age: 55
End: 2025-03-26
Payer: COMMERCIAL

## 2025-03-26 DIAGNOSIS — L66.10 LICHEN PLANOPILARIS: ICD-10-CM

## 2025-03-27 ENCOUNTER — HOSPITAL ENCOUNTER (OUTPATIENT)
Dept: RADIOLOGY | Facility: HOSPITAL | Age: 55
Discharge: HOME | End: 2025-03-27
Attending: STUDENT IN AN ORGANIZED HEALTH CARE EDUCATION/TRAINING PROGRAM
Payer: COMMERCIAL

## 2025-03-27 DIAGNOSIS — Z12.31 ENCOUNTER FOR SCREENING MAMMOGRAM FOR MALIGNANT NEOPLASM OF BREAST: ICD-10-CM

## 2025-03-27 PROCEDURE — 77063 BREAST TOMOSYNTHESIS BI: CPT | Mod: TC

## 2025-03-27 PROCEDURE — 77063 BREAST TOMOSYNTHESIS BI: CPT | Mod: 26,,, | Performed by: RADIOLOGY

## 2025-03-27 PROCEDURE — 77067 SCR MAMMO BI INCL CAD: CPT | Mod: 26,,, | Performed by: RADIOLOGY

## 2025-03-27 RX ORDER — CLOBETASOL PROPIONATE 0.5 MG/ML
SOLUTION TOPICAL
Qty: 50 ML | Refills: 5 | Status: SHIPPED | OUTPATIENT
Start: 2025-03-27

## 2025-03-27 NOTE — TELEPHONE ENCOUNTER
Encounter Date: 02/20/2025    Lichen planopilaris  Alopecia areata  Androgenetic alopecia  Status: chronic condition flaring and/or not at treatment goal  Her hair thinning is multifactorial   Examination of scalp today with patchy hair loss throughout mid frontal/vertex scalp with background erythema and tight perifollicular scale under dermoscopy possibly c/w LPP  Also round patch of L frontal scalp possibly c/w AA, some hair regrowth in this patch so favor non-scarring  Mild miniaturization of follicles c/w AGA     I discussed suspect this may be LPP or inflammatory type of hair loss. Offered bx to confirm (would perform x 2) or can treat empirically first. Pt defers biopsy for now and would prefer empiric tx. We would biopsy before escalating to HCQ or for worsening.  Discussed that alopecia is typically slowly progressive with time. Primary goal of treatment is to prevent worsening, secondary goal regrowth. Treatments take 6-12 months consistent use to see if it is working     Discussed clobetasol soln, ILK, plaquenil, doxy (pt allergic to many medicines and abx), LDOM, finasteride, spironolactone. Pt says she is allergic to may pills prefers to start with topicals/ILK. Pt is pre-menopausal      Plan:  Continue clobetasol Monday-Friday, weekends off  Continue minoxidil 5% liquid or foam (generic version of Rogaine mens strength) daily - cheapest at [employer] in bulk  Start spironolactone 50 mg QD     Discussed benefits and risks of therapy including but not limited to breakthrough bleeding, breast tenderness, and elevated potassium levels which may give symptoms of fatigue, palpitations, and nausea. Patient should limit potassium intake - avoid potassium supplements or salt substitutes, limit bananas and citrus fruits. Pregnancy must be avoided while taking spironolactone.     Intralesional Kenalog 5 mg/cc (2 cc total) injected into > 8 lesions on the scalp today (frontal, vertex) after obtaining  verbal consent including risk of surrounding hypopigmentation. Patient tolerated procedure well.     Units: 1  NDC for Kenalog 10mg/cc:  5945-3295-13     RTC 3 mo for hair loss follow up

## 2025-04-04 ENCOUNTER — PATIENT MESSAGE (OUTPATIENT)
Dept: DERMATOLOGY | Facility: CLINIC | Age: 55
End: 2025-04-04
Payer: COMMERCIAL

## 2025-04-07 ENCOUNTER — OFFICE VISIT (OUTPATIENT)
Dept: PRIMARY CARE CLINIC | Facility: CLINIC | Age: 55
End: 2025-04-07
Payer: COMMERCIAL

## 2025-04-07 VITALS
BODY MASS INDEX: 35.33 KG/M2 | HEART RATE: 102 BPM | TEMPERATURE: 99 F | HEIGHT: 68 IN | WEIGHT: 233.13 LBS | DIASTOLIC BLOOD PRESSURE: 85 MMHG | SYSTOLIC BLOOD PRESSURE: 124 MMHG | OXYGEN SATURATION: 97 %

## 2025-04-07 DIAGNOSIS — I10 ESSENTIAL HYPERTENSION: ICD-10-CM

## 2025-04-07 DIAGNOSIS — E66.01 SEVERE OBESITY (BMI 35.0-35.9 WITH COMORBIDITY): ICD-10-CM

## 2025-04-07 DIAGNOSIS — J06.9 URI, ACUTE: Primary | ICD-10-CM

## 2025-04-07 PROCEDURE — 99214 OFFICE O/P EST MOD 30 MIN: CPT | Mod: 25,S$GLB,, | Performed by: NURSE PRACTITIONER

## 2025-04-07 PROCEDURE — 3044F HG A1C LEVEL LT 7.0%: CPT | Mod: CPTII,S$GLB,, | Performed by: NURSE PRACTITIONER

## 2025-04-07 PROCEDURE — 3079F DIAST BP 80-89 MM HG: CPT | Mod: CPTII,S$GLB,, | Performed by: NURSE PRACTITIONER

## 2025-04-07 PROCEDURE — 3074F SYST BP LT 130 MM HG: CPT | Mod: CPTII,S$GLB,, | Performed by: NURSE PRACTITIONER

## 2025-04-07 PROCEDURE — 3008F BODY MASS INDEX DOCD: CPT | Mod: CPTII,S$GLB,, | Performed by: NURSE PRACTITIONER

## 2025-04-07 PROCEDURE — 1159F MED LIST DOCD IN RCRD: CPT | Mod: CPTII,S$GLB,, | Performed by: NURSE PRACTITIONER

## 2025-04-07 PROCEDURE — 99999 PR PBB SHADOW E&M-EST. PATIENT-LVL IV: CPT | Mod: PBBFAC,,, | Performed by: NURSE PRACTITIONER

## 2025-04-07 RX ORDER — BENZONATATE 100 MG/1
100 CAPSULE ORAL 3 TIMES DAILY PRN
Qty: 30 CAPSULE | Refills: 0 | Status: SHIPPED | OUTPATIENT
Start: 2025-04-07 | End: 2025-04-17

## 2025-04-07 RX ORDER — AZELASTINE 1 MG/ML
1 SPRAY, METERED NASAL 2 TIMES DAILY
Qty: 30 ML | Refills: 0 | Status: SHIPPED | OUTPATIENT
Start: 2025-04-07 | End: 2025-04-12

## 2025-04-07 NOTE — PROGRESS NOTES
Subjective:       Patient ID: Dyan Arredondo is a 54 y.o. female.    Chief Complaint: Nasal Congestion (Cough )      History of Present Illness    CHIEF COMPLAINT:  Patient presents today for cough and congestion.    UPPER RESPIRATORY SYMPTOMS:  She reports chest cough starting yesterday without sputum production. She takes prescription cough medicine and NyQuil at night for symptom management. She also reports sore throat with difficulty swallowing that started today.    MEDICAL HISTORY:  She has hypertension managed with medication.    ALLERGIES:  She reports allergies to multiple antibiotics including amoxicillin, erythromycin, tetracycline, and penicillins. She takes OTC allergy medication as needed for environmental allergens, attempting to limit frequency of use.    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 She consumes approximately one bottle of wine nightly with dinner.    UPCOMING PROCEDURES:  She has scheduled mole removal on Thursday.     Past Medical History:   Diagnosis Date    Allergy     Breast cyst     High risk HPV infection         Past Surgical History:   Procedure Laterality Date    BREAST CYST EXCISION Right     COLONOSCOPY N/A 6/4/2024    Procedure: COLONOSCOPY;  Surgeon: Yeimi De La Torre MD;  Location: [facility] ENDOSCOPY;  Service: Endoscopy;  Laterality: N/A;  Referral:Sakshi Rubio MD / [facility] / [facility] - LW  5/27/24- pc complete. DBM    EXTERNAL EAR SURGERY      SINUS SURGERY      turbanite          Family History   Problem Relation Name Age of Onset    Allergies Mother      Cancer Father Vera Patton 80        melanoma    Depression Brother Salomón Patton     Cancer Paternal Aunt Dyan         lung CA +smoker ?    Cancer Paternal Grandmother EG         dx age 50's? +smoker (possibly)    Cancer Paternal Grandfather Vera Sr         lung CA dx age 50's +smoker (possibly)    Breast cancer Neg Hx      Colon cancer Neg Hx       "Ovarian cancer Neg Hx         Tobacco Use History[1]    Social History     Social History Narrative    Not on file       Review of patient's allergies indicates:   Allergen Reactions    Amoxicillin trihydrate Rash     Rash and hives     Erythromycin base Rash     Rash and hives     Omnipaque 140 [iohexol] Rash     Rash and hives     Penicillins Rash     Rash and hives     Tetracyclines Hives and Rash     TETRACYCLINE Hydrochloride     Clindamycin Hives        Review of Systems   HENT:  Positive for congestion, sinus pain and sore throat. Negative for ear pain, postnasal drip, rhinorrhea, sinus pressure and trouble swallowing.    Respiratory:  Positive for cough. Negative for chest tightness and shortness of breath.    Cardiovascular:  Negative for chest pain and palpitations.   Gastrointestinal:  Negative for nausea and vomiting.   Neurological:  Negative for dizziness, light-headedness and headaches.         Objective:      Vitals:    04/07/25 0955   BP: 124/85   BP Location: Right arm   Patient Position: Sitting   Pulse: 102   Temp: 98.5 °F (36.9 °C)   TempSrc: Oral   SpO2: 97%   Weight: 105.7 kg (233 lb 2.2 oz)   Height: 5' 8" (1.727 m)         Physical Exam  Constitutional:       Appearance: She is ill-appearing.   HENT:      Right Ear: External ear normal.      Left Ear: External ear normal.      Nose: Congestion present. No rhinorrhea.      Mouth/Throat:      Mouth: Mucous membranes are moist.      Pharynx: Oropharynx is clear. No oropharyngeal exudate or posterior oropharyngeal erythema.   Eyes:      Conjunctiva/sclera: Conjunctivae normal.   Pulmonary:      Effort: Pulmonary effort is normal. No respiratory distress.      Breath sounds: Wheezing present.   Neurological:      Mental Status: She is alert and oriented to person, place, and time.         Assessment:       1. URI, acute    2. Essential hypertension    3. Severe obesity (BMI 35.0-35.9 with comorbidity)        Plan:       URI, acute  -     " azelastine (ASTELIN) 137 mcg (0.1 %) nasal spray; 1 spray (137 mcg total) by Nasal route 2 (two) times daily. for 5 days  Dispense: 30 mL; Refill: 0  -     benzonatate (TESSALON) 100 MG capsule; Take 1 capsule (100 mg total) by mouth 3 (three) times daily as needed for Cough.  Dispense: 30 capsule; Refill: 0    Essential hypertension  Stable, continue with current management.    Severe obesity (BMI 35.0-35.9 with comorbidity)  Extensive teaching/discussion on obesity, disease management, interventions/treatment.  Recommend and encouraged weight loss, emphasize health benefits.      Assessment & Plan    ESSENTIAL HYPERTENSION:  - Monitor the patient's blood pressure medication regimen.  - Evaluate blood pressure, which is currently within normal range.  - Continue the current blood pressure medication as it is effective.    URI, acute:  - Monitor the cough that started yesterday, felt in the chest.  - Evaluate the lungs, which are clear upon exam.  - Assess the cough as part of an upper respiratory issue.  - Prescribe Tessalon Perles to be taken every 8 hours for at least 2-3 days before surgery.  - Continue promethazine to be taken at night for cough as needed.  - Recommend drinking hot tea with lemon and honey for symptom relief.  - Advise the patient to report any worsening of symptoms or development of sputum production.  - Prescribe Azelastine nasal spray to be used 2 times daily for 5 days.  - Educate the patient about the impact of weather changes and altitude variations on sinus symptoms.  - Recommend room temperature water instead of cold water for sinus health.  - Instruct the patient to report any changes in symptoms or lack of improvement.  -Covid and Strept negative.      Medication List with Changes/Refills   New Medications    AZELASTINE (ASTELIN) 137 MCG (0.1 %) NASAL SPRAY    1 spray (137 mcg total) by Nasal route 2 (two) times daily. for 5 days    BENZONATATE (TESSALON) 100 MG CAPSULE    Take 1  capsule (100 mg total) by mouth 3 (three) times daily as needed for Cough.   Current Medications    ACYCLOVIR (ZOVIRAX) 400 MG TABLET    TAKE 1 TABLET BY MOUTH TWICE A DAY START AT ONSET OF SYMPTOMS. TAKE FOR 5 DAYS TOTAL    CLOBETASOL (TEMOVATE) 0.05 % EXTERNAL SOLUTION    Apply to scalp daily Monday-Friday, weekends off    DESONIDE (DESOWEN) 0.05 % CREAM    Apply topically 2 (two) times daily. Apply to affected areas of rash    DIPHENOXYLATE-ATROPINE 2.5-0.025 MG (LOMOTIL) 2.5-0.025 MG PER TABLET    Take 1 tablet by mouth 4 (four) times daily as needed for Diarrhea.    FAMOTIDINE (PEPCID) 20 MG TABLET    Take 20 mg by mouth 2 (two) times daily as needed for Heartburn.    FLUTICASONE PROPIONATE (FLONASE) 50 MCG/ACTUATION NASAL SPRAY    1 spray by Each Nostril route once daily.    METOPROLOL SUCCINATE (TOPROL-XL) 25 MG 24 HR TABLET    Take 1 tablet (25 mg total) by mouth once daily.    NITROFURANTOIN, MACROCRYSTAL-MONOHYDRATE, (MACROBID) 100 MG CAPSULE    Take 1 capsule (100 mg total) by mouth as needed (after intercourse).    NORGESTIMATE-ETHINYL ESTRADIOL (TRI-LO-GIOVANNI) 0.18/0.215/0.25 MG-25 MCG TABLET    Take 1 tablet by mouth once daily.    PROMETHAZINE (PHENERGAN) 1.25 MG/ML SYRP    Take 10 mLs (12.5 mg total) by mouth every 6 (six) hours as needed (Cough).    SPIRONOLACTONE (ALDACTONE) 50 MG TABLET    Take 1 pill by mouth daily        Follow up if symptoms worsen or fail to improve.    I spent a total of 30 minutes on the day of the visit.This includes face to face time and non-face to face time preparing to see the patient (eg, review of tests), obtaining and/or reviewing separately obtained history, documenting clinical information in the electronic or other health record, independently interpreting results and communicating results to the patient/family/caregiver, or care coordinator.     HALIMA Taylor, MSN, FNP-C     This note was generated with the assistance of ambient listening technology. Verbal  consent was obtained by the patient and accompanying visitor(s) for the recording of patient appointment to facilitate this note. I attest to having reviewed and edited the generated note for accuracy, though some syntax or spelling errors may persist. Please contact the author of this note for any clarification.            [1]   Social History  Tobacco Use   Smoking Status Never    Passive exposure: Never   Smokeless Tobacco Never

## 2025-04-10 ENCOUNTER — PROCEDURE VISIT (OUTPATIENT)
Dept: DERMATOLOGY | Facility: CLINIC | Age: 55
End: 2025-04-10
Payer: COMMERCIAL

## 2025-04-10 DIAGNOSIS — L63.9 ALOPECIA AREATA: ICD-10-CM

## 2025-04-10 DIAGNOSIS — D48.5 NEOPLASM OF UNCERTAIN BEHAVIOR OF SKIN: Primary | ICD-10-CM

## 2025-04-10 NOTE — PROGRESS NOTES
PROCEDURE: Elliptical excision with intermediate layered repair in order to decrease dead space and close large gap.    ANESTHETIC: 9 cc 1% Xylocaine with Epinephrine 1:100,000, buffered    SURGEON: Maritza Jones M.D.    ASSISTANTS: Ashley Garcia RN    PREOPERATIVE DIAGNOSIS:   Dermatofibroma    POSTOPERATIVE DIAGNOSIS:  Same as preoperative diagnosis    PATHOLOGIC DIAGNOSIS: Pending    LOCATION: R shoulder    INITIAL LESION SIZE: 1.3 x 1.0 cm    EXCISED DIAMETER: 1.2 cm cm    PREPARATION: The diagnosis, procedure, alternatives, benefits and risks, including but not limited to: infection, bleeding/bruising, drug reactions, pain, scar or cosmetic defect, local sensation disturbances, wound dehiscence (separation of wound edges after sutures removed) and/or recurrence of present condition were explained to the patient. The patient elected to proceed.  Patient's identity was verified using 2 patient identifiers and the side and site was verified.  Time out period with surgeon, assistant and patient in surgical suite was taken.    PROCEDURE: The location noted above was prepped, draped, and anesthetized in the usual sterile fashion per  me . Lesional tissue was carefully marked with at least 1 mm margins of clinically normal skin in all directions. A fusiform elliptical excision was done with #15 blade carried down completely through the dermis into the deep subcutaneous tissues to the level of the non-muscle fascia, and dissection was carried out in that plane.  Electrocoagulation was used to obtain hemostasis. Blood loss was minimal. The wound was then approximated in a layered fashion with subcutaneous and intradermal sutures of 3.0 Monocryl, approximately 6 in number, and the wound was then superficially closed with simple interrupted sutures of 4.0 Prolene.    The patient tolerated the procedure well.    The area was cleaned and dressed appropriately and the patient was given wound care instructions, as well as  an appointment for follow-up evaluation.    LENGTH OF REPAIR: 3.8 cm    Intralesional kenalog injections for alopecia areata performed today  Intralesional Kenalog 5 mg/cc (2 cc total) injected into <8 lesions on the scalp today after obtaining verbal consent including risk of surrounding hypopigmentation. Patient tolerated procedure well.    Units: 1  NDC for Kenalog 10mg/cc:  4843-4885-74    RTC 10-14 days suture removal

## 2025-04-15 ENCOUNTER — RESULTS FOLLOW-UP (OUTPATIENT)
Dept: DERMATOLOGY | Facility: CLINIC | Age: 55
End: 2025-04-15

## 2025-04-15 ENCOUNTER — HOSPITAL ENCOUNTER (OUTPATIENT)
Dept: RADIOLOGY | Facility: HOSPITAL | Age: 55
Discharge: HOME | End: 2025-04-15
Attending: STUDENT IN AN ORGANIZED HEALTH CARE EDUCATION/TRAINING PROGRAM
Payer: COMMERCIAL

## 2025-04-15 ENCOUNTER — RESULTS FOLLOW-UP (OUTPATIENT)
Dept: FAMILY MEDICINE | Facility: CLINIC | Age: 55
End: 2025-04-15

## 2025-04-15 DIAGNOSIS — R92.8 ABNORMAL MAMMOGRAPHY: ICD-10-CM

## 2025-04-15 PROCEDURE — 77065 DX MAMMO INCL CAD UNI: CPT | Mod: TC,LT

## 2025-04-15 PROCEDURE — 77061 BREAST TOMOSYNTHESIS UNI: CPT | Mod: 26,LT,, | Performed by: RADIOLOGY

## 2025-04-15 PROCEDURE — 76642 ULTRASOUND BREAST LIMITED: CPT | Mod: TC,LT

## 2025-04-15 PROCEDURE — 77065 DX MAMMO INCL CAD UNI: CPT | Mod: 26,LT,, | Performed by: RADIOLOGY

## 2025-04-15 PROCEDURE — 76642 ULTRASOUND BREAST LIMITED: CPT | Mod: 26,LT,, | Performed by: RADIOLOGY

## 2025-04-17 ENCOUNTER — OFFICE VISIT (OUTPATIENT)
Dept: PRIMARY CARE CLINIC | Facility: CLINIC | Age: 55
End: 2025-04-17
Payer: COMMERCIAL

## 2025-04-17 VITALS
SYSTOLIC BLOOD PRESSURE: 126 MMHG | TEMPERATURE: 98 F | BODY MASS INDEX: 33.65 KG/M2 | WEIGHT: 222 LBS | HEART RATE: 94 BPM | OXYGEN SATURATION: 99 % | HEIGHT: 68 IN | DIASTOLIC BLOOD PRESSURE: 86 MMHG | RESPIRATION RATE: 20 BRPM

## 2025-04-17 DIAGNOSIS — R05.3 PERSISTENT DRY COUGH: Primary | ICD-10-CM

## 2025-04-17 DIAGNOSIS — I10 ESSENTIAL HYPERTENSION: ICD-10-CM

## 2025-04-17 DIAGNOSIS — K21.9 GASTROESOPHAGEAL REFLUX DISEASE WITHOUT ESOPHAGITIS: ICD-10-CM

## 2025-04-17 PROCEDURE — 3008F BODY MASS INDEX DOCD: CPT | Mod: CPTII,S$GLB,, | Performed by: NURSE PRACTITIONER

## 2025-04-17 PROCEDURE — 99215 OFFICE O/P EST HI 40 MIN: CPT | Mod: S$GLB,,, | Performed by: NURSE PRACTITIONER

## 2025-04-17 PROCEDURE — 3074F SYST BP LT 130 MM HG: CPT | Mod: CPTII,S$GLB,, | Performed by: NURSE PRACTITIONER

## 2025-04-17 PROCEDURE — 99999 PR PBB SHADOW E&M-EST. PATIENT-LVL V: CPT | Mod: PBBFAC,,, | Performed by: NURSE PRACTITIONER

## 2025-04-17 PROCEDURE — 3079F DIAST BP 80-89 MM HG: CPT | Mod: CPTII,S$GLB,, | Performed by: NURSE PRACTITIONER

## 2025-04-17 PROCEDURE — 3044F HG A1C LEVEL LT 7.0%: CPT | Mod: CPTII,S$GLB,, | Performed by: NURSE PRACTITIONER

## 2025-04-17 PROCEDURE — 1159F MED LIST DOCD IN RCRD: CPT | Mod: CPTII,S$GLB,, | Performed by: NURSE PRACTITIONER

## 2025-04-17 RX ORDER — BENZONATATE 200 MG/1
200 CAPSULE ORAL 3 TIMES DAILY PRN
Qty: 30 CAPSULE | Refills: 0 | Status: SHIPPED | OUTPATIENT
Start: 2025-04-17 | End: 2025-04-27

## 2025-04-17 RX ORDER — PANTOPRAZOLE SODIUM 40 MG/1
40 TABLET, DELAYED RELEASE ORAL DAILY
Qty: 90 TABLET | Refills: 3 | Status: SHIPPED | OUTPATIENT
Start: 2025-04-17 | End: 2026-04-17

## 2025-04-18 ENCOUNTER — PATIENT MESSAGE (OUTPATIENT)
Dept: PRIMARY CARE CLINIC | Facility: CLINIC | Age: 55
End: 2025-04-18
Payer: COMMERCIAL

## 2025-04-18 DIAGNOSIS — J06.9 UPPER RESPIRATORY TRACT INFECTION, UNSPECIFIED TYPE: ICD-10-CM

## 2025-04-18 NOTE — TELEPHONE ENCOUNTER
No care due was identified.  [facility] Embedded Care Due Messages. Reference number: [id number].   4/18/2025 4:34:54 PM CDT

## 2025-04-18 NOTE — PROGRESS NOTES
Subjective:       Patient ID: Dyan Arredondo is a 54 y.o. female.    Chief Complaint: Cough (Presented about 10 days ago. )    History of Present Illness    CHIEF COMPLAINT:  Patient presents today for persistent cough.    CURRENT SYMPTOMS:  She reports a persistent and worsening cough that is continuous without resolution after clearing the throat. The cough initially produced gray mucus but is currently dry. She denies chest or lung involvement and throat irritation, but notes voice changes similar to having a cold. The cough is significantly impacting her quality of life, causing urinary incontinence and sleep disturbances. She sleeps in a sitting position to avoid post-nasal drip and uses prescription cough medicine at night. She recently experienced cold symptoms including head congestion and sinus problems which have resolved, though nasal drainage continues. Tests for COVID, flu, and strep were negative.    GERD:  She has a history of GERD initially diagnosed in [place]. She reports inconsistent compliance with prescribed reflux medication due to forgetfulness. She experiences heartburn at night when medication is discontinued for a few weeks. Known dietary triggers include red wine, spicy foods, fried foods, sodas, and caffeinated beverages.    ALLERGIES:  She has a history of allergies with last allergist visit 3-4 years ago. She takes daily nasal spray and uses allergy pills as needed for rhinorrhea.    CURRENT MEDICATIONS:  She takes Metoprolol and has not taken Famotidine for GERD management in weeks.    SOCIAL HISTORY:  She frequently travels by air between [place] and [place] weekly, though has not traveled since becoming ill.     Past Medical History:   Diagnosis Date    Allergy     Breast cyst     High risk HPV infection         Past Surgical History:   Procedure Laterality Date    BREAST CYST EXCISION Right     COLONOSCOPY N/A 6/4/2024    Procedure: COLONOSCOPY;  Surgeon: Yeimi De La Torre,  MD;  Location: [facility] ENDOSCOPY;  Service: Endoscopy;  Laterality: N/A;  Referral:Sakshi Rubio MD / [facility] / [facility] - LW  5/27/24- pc complete. DBM    EXTERNAL EAR SURGERY      SINUS SURGERY      turbanite          Family History   Problem Relation Name Age of Onset    Allergies Mother      Cancer Father Vera Patton 80        melanoma    Depression Brother Salomón Patton     Cancer Paternal Aunt Dyan         lung CA +smoker ?    Cancer Paternal Grandmother EG         dx age 50's? +smoker (possibly)    Cancer Paternal Grandfather Vera Sr         lung CA dx age 50's +smoker (possibly)    Breast cancer Neg Hx      Colon cancer Neg Hx      Ovarian cancer Neg Hx         Tobacco Use History[1]    Social History     Social History Narrative    Not on file       Review of patient's allergies indicates:   Allergen Reactions    Amoxicillin trihydrate Rash     Rash and hives     Erythromycin base Rash     Rash and hives     Omnipaque 140 [iohexol] Rash     Rash and hives     Penicillins Rash     Rash and hives     Tetracyclines Hives and Rash     TETRACYCLINE Hydrochloride     Clindamycin Hives        Review of Systems   Respiratory:  Positive for cough. Negative for chest tightness and shortness of breath.    Cardiovascular:  Negative for chest pain and palpitations.   Gastrointestinal:  Negative for nausea and vomiting.   Neurological:  Negative for dizziness, light-headedness and headaches.         Objective:        Vitals:    04/17/25 1501   BP: 126/86   Pulse: 94   Resp: 20   Temp: 98 °F (36.7 °C)        Physical Exam    Assessment:       1. Persistent dry cough    2. Gastroesophageal reflux disease without esophagitis    3. Essential hypertension        Plan:       Persistent dry cough  Rule out lung disease.  -     Complete PFT with bronchodilator; Future  -     PULSE OXIMETRY WITH REST - PULM; Future  -     X-Ray Chest PA And Lateral; Future; Expected date: 04/17/2025  -     benzonatate (TESSALON) 200  MG capsule; Take 1 capsule (200 mg total) by mouth 3 (three) times daily as needed for Cough.  Dispense: 30 capsule; Refill: 0  -     Quantiferon Gold TB; Future; Expected date: 04/17/2025    Gastroesophageal reflux disease without esophagitis  -     pantoprazole (PROTONIX) 40 MG tablet; Take 1 tablet (40 mg total) by mouth once daily.  Dispense: 90 tablet; Refill: 3    Essential hypertension  Stable, continue with metoprolol.    Assessment & Plan    Persistent dry cough:  - Evaluated the patient's persistent cough, which has been present for more than 12 days and worsening over time.  - Noted that the cough is dry, continuous, and disturbing sleep.  - Performed lung auscultation, with results implying normal findings.  - Prescribed Robitussin DM (OTC) for cough management, as needed.  - Increased benzonatate dosage to 3 times daily as needed for cough suppression.  - Ordered chest XR to evaluate for lung disease.  - Ordered pulmonary function test (PFT) to assess lung function.  - Ordered QuantiFERON-TB Gold test to rule out tuberculosis.  - Ruled out potential causes including smoking history, COVID-19, influenza, and streptococcal infection.  - Noted that the patient reports difficulty sleeping due to cough.  - Confirmed that the patient has relief at night with promethazine.  GASTROESOPHAGEAL REFLUX DISEASE (GERD):  - Explained the relationship between GERD and persistent cough.  - Prescribed Pantoprazole 40 mg daily for GERD management, replacing previous Famotidine medication.  - Discontinued Famotidine.  - Discussed dietary triggers for GERD and recommended appropriate dietary changes to manage symptoms.  - Discussed dietary triggers for GERD, including red wine, spicy foods, fried foods, sodas, and caffeine.  - Recommend improving diet to help manage GERD symptoms.    FOLLOW-UP:  - Follow up in 2 weeks, virtual visit option available due to patient's travel schedule.     Medication List with Changes/Refills    New Medications    BENZONATATE (TESSALON) 200 MG CAPSULE    Take 1 capsule (200 mg total) by mouth 3 (three) times daily as needed for Cough.    PANTOPRAZOLE (PROTONIX) 40 MG TABLET    Take 1 tablet (40 mg total) by mouth once daily.   Current Medications    ACYCLOVIR (ZOVIRAX) 400 MG TABLET    TAKE 1 TABLET BY MOUTH TWICE A DAY START AT ONSET OF SYMPTOMS. TAKE FOR 5 DAYS TOTAL    AZELASTINE (ASTELIN) 137 MCG (0.1 %) NASAL SPRAY    1 spray (137 mcg total) by Nasal route 2 (two) times daily. for 5 days    CLOBETASOL (TEMOVATE) 0.05 % EXTERNAL SOLUTION    Apply to scalp daily Monday-Friday, weekends off    DESONIDE (DESOWEN) 0.05 % CREAM    Apply topically 2 (two) times daily. Apply to affected areas of rash    DIPHENOXYLATE-ATROPINE 2.5-0.025 MG (LOMOTIL) 2.5-0.025 MG PER TABLET    Take 1 tablet by mouth 4 (four) times daily as needed for Diarrhea.    FLUTICASONE PROPIONATE (FLONASE) 50 MCG/ACTUATION NASAL SPRAY    1 spray by Each Nostril route once daily.    METOPROLOL SUCCINATE (TOPROL-XL) 25 MG 24 HR TABLET    Take 1 tablet (25 mg total) by mouth once daily.    NITROFURANTOIN, MACROCRYSTAL-MONOHYDRATE, (MACROBID) 100 MG CAPSULE    Take 1 capsule (100 mg total) by mouth as needed (after intercourse).    NORGESTIMATE-ETHINYL ESTRADIOL (TRI-LO-GIOVANNI) 0.18/0.215/0.25 MG-25 MCG TABLET    Take 1 tablet by mouth once daily.    PROMETHAZINE (PHENERGAN) 1.25 MG/ML SYRP    Take 10 mLs (12.5 mg total) by mouth every 6 (six) hours as needed (Cough).    SPIRONOLACTONE (ALDACTONE) 50 MG TABLET    Take 1 pill by mouth daily   Discontinued Medications    BENZONATATE (TESSALON) 100 MG CAPSULE    Take 1 capsule (100 mg total) by mouth 3 (three) times daily as needed for Cough.    FAMOTIDINE (PEPCID) 20 MG TABLET    Take 20 mg by mouth 2 (two) times daily as needed for Heartburn.            Follow up in about 2 weeks (around 5/1/2025) for Virtual Visit, Marina Zepeda MSN, APRN, FNP-C.    I spent a total of 40 minutes on the day  of the visit.This includes face to face time and non-face to face time preparing to see the patient (eg, review of tests), obtaining and/or reviewing separately obtained history, documenting clinical information in the electronic or other health record, independently interpreting results and communicating results to the patient/family/caregiver, or care coordinator.     HALIMA Taylor, MSN, FNP-C     This note was generated with the assistance of ambient listening technology. Verbal consent was obtained by the patient and accompanying visitor(s) for the recording of patient appointment to facilitate this note. I attest to having reviewed and edited the generated note for accuracy, though some syntax or spelling errors may persist. Please contact the author of this note for any clarification.            [1]   Social History  Tobacco Use   Smoking Status Never    Passive exposure: Never   Smokeless Tobacco Never

## 2025-04-21 ENCOUNTER — CLINICAL SUPPORT (OUTPATIENT)
Dept: DERMATOLOGY | Facility: CLINIC | Age: 55
End: 2025-04-21
Payer: COMMERCIAL

## 2025-04-21 ENCOUNTER — HOSPITAL ENCOUNTER (OUTPATIENT)
Dept: RADIOLOGY | Facility: HOSPITAL | Age: 55
Discharge: HOME | End: 2025-04-21
Attending: NURSE PRACTITIONER
Payer: COMMERCIAL

## 2025-04-21 DIAGNOSIS — Z48.02 VISIT FOR SUTURE REMOVAL: Primary | ICD-10-CM

## 2025-04-21 DIAGNOSIS — R05.3 PERSISTENT DRY COUGH: ICD-10-CM

## 2025-04-21 PROCEDURE — 71046 X-RAY EXAM CHEST 2 VIEWS: CPT | Mod: TC

## 2025-04-21 PROCEDURE — 99212 OFFICE O/P EST SF 10 MIN: CPT | Mod: S$GLB,,, | Performed by: STUDENT IN AN ORGANIZED HEALTH CARE EDUCATION/TRAINING PROGRAM

## 2025-04-21 PROCEDURE — 71046 X-RAY EXAM CHEST 2 VIEWS: CPT | Mod: 26,,, | Performed by: RADIOLOGY

## 2025-04-21 RX ORDER — PROMETHAZINE HYDROCHLORIDE 6.25 MG/5ML
SYRUP ORAL
Qty: 473 ML | Refills: 0 | Status: SHIPPED | OUTPATIENT
Start: 2025-04-21

## 2025-04-21 NOTE — PROGRESS NOTES
CC: 54 y.o.female patient is here for suture removal.     HPI: Patient is s/p punch biopsy/excision of dermatofibroma on right shoulder.  Patient reports no problems.    WOUND PE:  Sutures intact.  Wound healing well.  Good approximation of skin edges.  No signs or symptoms of infection.    IMPRESSION:  RESIDUAL DERMATOFIBROMA, COMPLETELY EXCISED   - INCIDENTAL INTRADERMAL NEVUS ABUTTING PERIPHERAL MARGIN     PLAN:  Sutures removed today.  Continue wound care.    RTC: In 6 month(s) for skin check or sooner should any new concerns arise

## 2025-04-23 ENCOUNTER — RESULTS FOLLOW-UP (OUTPATIENT)
Dept: PRIMARY CARE CLINIC | Facility: CLINIC | Age: 55
End: 2025-04-23

## 2025-05-27 ENCOUNTER — OFFICE VISIT (OUTPATIENT)
Dept: FAMILY MEDICINE | Facility: CLINIC | Age: 55
End: 2025-05-27
Payer: COMMERCIAL

## 2025-05-27 VITALS
HEART RATE: 90 BPM | SYSTOLIC BLOOD PRESSURE: 122 MMHG | DIASTOLIC BLOOD PRESSURE: 82 MMHG | WEIGHT: 221.56 LBS | HEIGHT: 68 IN | OXYGEN SATURATION: 98 % | BODY MASS INDEX: 33.58 KG/M2

## 2025-05-27 DIAGNOSIS — B00.1 RECURRENT COLD SORES: ICD-10-CM

## 2025-05-27 DIAGNOSIS — K21.9 GASTROESOPHAGEAL REFLUX DISEASE WITHOUT ESOPHAGITIS: ICD-10-CM

## 2025-05-27 DIAGNOSIS — Z30.41 ENCOUNTER FOR SURVEILLANCE OF CONTRACEPTIVE PILLS: ICD-10-CM

## 2025-05-27 DIAGNOSIS — E66.811 CLASS 1 OBESITY DUE TO EXCESS CALORIES WITH SERIOUS COMORBIDITY AND BODY MASS INDEX (BMI) OF 33.0 TO 33.9 IN ADULT: ICD-10-CM

## 2025-05-27 DIAGNOSIS — Z00.01 ENCOUNTER FOR GENERAL ADULT MEDICAL EXAMINATION WITH ABNORMAL FINDINGS: Primary | ICD-10-CM

## 2025-05-27 DIAGNOSIS — J30.9 ALLERGIC RHINITIS, UNSPECIFIED SEASONALITY, UNSPECIFIED TRIGGER: ICD-10-CM

## 2025-05-27 DIAGNOSIS — R19.7 INTERMITTENT DIARRHEA: ICD-10-CM

## 2025-05-27 DIAGNOSIS — I10 ESSENTIAL HYPERTENSION: ICD-10-CM

## 2025-05-27 DIAGNOSIS — B97.7 HIGH RISK HPV INFECTION: ICD-10-CM

## 2025-05-27 DIAGNOSIS — Z12.31 ENCOUNTER FOR SCREENING MAMMOGRAM FOR BREAST CANCER: ICD-10-CM

## 2025-05-27 DIAGNOSIS — N39.0 RECURRENT UTI: ICD-10-CM

## 2025-05-27 DIAGNOSIS — J06.9 VIRAL URI: ICD-10-CM

## 2025-05-27 DIAGNOSIS — Z23 IMMUNIZATION DUE: ICD-10-CM

## 2025-05-27 DIAGNOSIS — E66.09 CLASS 1 OBESITY DUE TO EXCESS CALORIES WITH SERIOUS COMORBIDITY AND BODY MASS INDEX (BMI) OF 33.0 TO 33.9 IN ADULT: ICD-10-CM

## 2025-05-27 DIAGNOSIS — F10.90 ALCOHOL USE: ICD-10-CM

## 2025-05-27 PROBLEM — R05.3 PERSISTENT DRY COUGH: Status: RESOLVED | Noted: 2025-04-17 | Resolved: 2025-05-27

## 2025-05-27 PROCEDURE — 3079F DIAST BP 80-89 MM HG: CPT | Mod: CPTII,S$GLB,, | Performed by: FAMILY MEDICINE

## 2025-05-27 PROCEDURE — 90677 PCV20 VACCINE IM: CPT | Mod: S$GLB,,, | Performed by: FAMILY MEDICINE

## 2025-05-27 PROCEDURE — 99396 PREV VISIT EST AGE 40-64: CPT | Mod: 25,S$GLB,, | Performed by: FAMILY MEDICINE

## 2025-05-27 PROCEDURE — 3008F BODY MASS INDEX DOCD: CPT | Mod: CPTII,S$GLB,, | Performed by: FAMILY MEDICINE

## 2025-05-27 PROCEDURE — 99999 PR PBB SHADOW E&M-EST. PATIENT-LVL V: CPT | Mod: PBBFAC,,, | Performed by: FAMILY MEDICINE

## 2025-05-27 PROCEDURE — 3044F HG A1C LEVEL LT 7.0%: CPT | Mod: CPTII,S$GLB,, | Performed by: FAMILY MEDICINE

## 2025-05-27 PROCEDURE — 3074F SYST BP LT 130 MM HG: CPT | Mod: CPTII,S$GLB,, | Performed by: FAMILY MEDICINE

## 2025-05-27 PROCEDURE — 90471 IMMUNIZATION ADMIN: CPT | Mod: S$GLB,,, | Performed by: FAMILY MEDICINE

## 2025-05-27 RX ORDER — ACYCLOVIR 400 MG/1
TABLET ORAL
Qty: 30 TABLET | Refills: 3 | Status: SHIPPED | OUTPATIENT
Start: 2025-05-27 | End: 2025-06-11 | Stop reason: SDUPTHER

## 2025-05-27 RX ORDER — DIPHENOXYLATE HYDROCHLORIDE AND ATROPINE SULFATE 2.5; .025 MG/1; MG/1
1 TABLET ORAL DAILY PRN
Qty: 30 TABLET | Refills: 3 | Status: SHIPPED | OUTPATIENT
Start: 2025-05-27

## 2025-05-27 RX ORDER — NITROFURANTOIN 25; 75 MG/1; MG/1
100 CAPSULE ORAL
Qty: 30 CAPSULE | Refills: 3 | Status: SHIPPED | OUTPATIENT
Start: 2025-05-27

## 2025-05-27 RX ORDER — METOPROLOL SUCCINATE 25 MG/1
25 TABLET, EXTENDED RELEASE ORAL DAILY
Qty: 90 TABLET | Refills: 3 | Status: SHIPPED | OUTPATIENT
Start: 2025-05-27

## 2025-05-27 RX ORDER — PANTOPRAZOLE SODIUM 40 MG/1
40 TABLET, DELAYED RELEASE ORAL DAILY
Qty: 90 TABLET | Refills: 3 | Status: SHIPPED | OUTPATIENT
Start: 2025-05-27 | End: 2026-05-27

## 2025-05-27 RX ORDER — NORGESTIMATE AND ETHINYL ESTRADIOL 7DAYSX3 LO
1 KIT ORAL DAILY
Qty: 90 TABLET | Refills: 3 | Status: SHIPPED | OUTPATIENT
Start: 2025-05-27

## 2025-05-27 RX ORDER — AZELASTINE 1 MG/ML
1 SPRAY, METERED NASAL 2 TIMES DAILY PRN
Qty: 30 ML | Refills: 3 | Status: SHIPPED | OUTPATIENT
Start: 2025-05-27

## 2025-05-27 RX ORDER — FLUTICASONE PROPIONATE 50 MCG
1 SPRAY, SUSPENSION (ML) NASAL DAILY
Qty: 15.8 ML | Refills: 11 | Status: SHIPPED | OUTPATIENT
Start: 2025-05-27

## 2025-05-27 NOTE — PROGRESS NOTES
Subjective:         Patient ID: Dyan Arredondo is a 54 y.o. female.    Chief Complaint: Establish Care and Annual Exam    Patient Active Problem List   Diagnosis    GERD (gastroesophageal reflux disease)    Recurrent cold sores    Numbness of foot    High risk HPV infection    Decreased range of motion (ROM) of right knee    Decreased strength of lower extremity    Alcohol use    Essential hypertension    Class 1 obesity due to excess calories with serious comorbidity and body mass index (BMI) of 33.0 to 33.9 in adult    Chorioretinal scar of right eye    Vitreomacular adhesion of both eyes    Age-related nuclear cataract of both eyes    Encounter for surveillance of contraceptive pills    Recurrent UTI    Intermittent diarrhea      HPI    Dyan is a 54 y.o. female    History of Present Illness    CHIEF COMPLAINT:  Dyan presents today for annual follow-up    CURRENT SYMPTOMS:  She presents with upper respiratory infection symptoms including voice hoarseness, nasal drip, and fluid in ears. Improving. Expected course discussed.     GERD:  She has a history of chronic cough that lasted 8 weeks, which resolved after starting Protonix for GERD diagnosis. She continues to sleep in a semi-upright position due to post-nasal drip concerns and recent cough resolution.    MEDICAL HISTORY:  She has a history of hypertension managed with Metoprolol, recurrent UTIs requiring post-intercourse prophylaxis with Macrobid, and intestinal spasms occurring every 6 weeks lasting 45 minutes managed with Lamotrigine. She also has a history of cold sores managed with Cyclovir as needed, reporting resolution within 72 hours. She has a history of positive HPV.    MEDICATIONS:  Current medications include Protonix, Metoprolol, Lamotrigine, Macrobid (post-intercourse), Cyclovir (as needed), and oral contraceptives (36-year history of use).    ALLERGIES:  She reports allergies to most antibiotics, tolerating only one antibiotic. She also  "reports spider allergy with recent bite causing significant foot swelling one week ago.    SOCIAL HISTORY:  She reports consuming one bottle of wine daily.    PREVENTIVE CARE:  Mammogram completed on April 15, 2025, including left-sided follow-up, showed normal results. Next bilateral screening mammogram due April 2026.    RECENT LABS:  March 2025 labs showed normal thyroid function, normal CMP including kidney and liver function, no anemia, and normal cholesterol levels.       Objective:     Vitals:    05/27/25 0815   BP: 122/82   BP Location: Left arm   Patient Position: Sitting   Pulse: 90   SpO2: 98%   Weight: 100.5 kg (221 lb 9 oz)   Height: 5' 8" (1.727 m)         Physical Exam  Vitals and nursing note reviewed.   Constitutional:       General: She is not in acute distress.     Appearance: Normal appearance. She is not ill-appearing, toxic-appearing or diaphoretic.   HENT:      Head: Normocephalic and atraumatic.   Eyes:      General: No scleral icterus.     Conjunctiva/sclera: Conjunctivae normal.   Cardiovascular:      Rate and Rhythm: Normal rate.      Heart sounds: Normal heart sounds. No murmur heard.  Pulmonary:      Effort: Pulmonary effort is normal. No respiratory distress.      Breath sounds: Normal breath sounds.   Abdominal:      General: Bowel sounds are normal.   Skin:     Coloration: Skin is not pale.   Neurological:      Mental Status: She is alert. Mental status is at baseline.   Psychiatric:         Attention and Perception: Attention and perception normal.         Mood and Affect: Mood and affect normal.         Speech: Speech normal.         Behavior: Behavior normal.         Cognition and Memory: Cognition and memory normal.         Judgment: Judgment normal.       The 10-year ASCVD risk score (Santo ALEXANDER, et al., 2019) is: 1.5%    Values used to calculate the score:      Age: 54 years      Sex: Female      Is Non- : No      Diabetic: No      Tobacco smoker: No      " Systolic Blood Pressure: 122 mmHg      Is BP treated: Yes      HDL Cholesterol: 69 mg/dL      Total Cholesterol: 158 mg/dL    Assessment:       1. Encounter for general adult medical examination with abnormal findings    2. Allergic rhinitis, unspecified seasonality, unspecified trigger    3. Essential hypertension Active   4. Recurrent cold sores Chronic   5. Intermittent diarrhea Chronic   6. Gastroesophageal reflux disease without esophagitis    7. Class 1 obesity due to excess calories with serious comorbidity and body mass index (BMI) of 33.0 to 33.9 in adult    8. Recurrent UTI    9. Encounter for surveillance of contraceptive pills Chronic   10. Encounter for screening mammogram for breast cancer    11. Alcohol use    12. High risk HPV infection    13. Immunization due    14. Viral URI          Plan:   Recent relevant labs results reviewed with patient.         Assessment & Plan    Reviewed March 2025 labs: thyroid, CMP, CBC, lipid panel all WNL.  Vitamin B12 level borderline at 428, recommended supplementation.  Evaluated upper respiratory symptoms.    - Recommend increasing use of nasal sprays and sinus rinses, and using Astelin as needed for both allergy symptoms and URI.    - Dyan reports no heartburn since starting Protonix, indicating improvement in GERD symptoms.  - Persistent cough for approximately 8 weeks subsided after starting Protonix, suggesting silent reflux.  - Will continue Protonix daily for GERD/cough symptom management.    ALCOHOL USE:  - Dyan reports consuming a bottle of wine nightly for many years.  - Educated about the downstream consequences of alcohol use and discussed personal decision to reduce consumption.      1. Encounter for general adult medical examination with abnormal findings  -     Comprehensive Metabolic Panel; Future; Expected date: 05/27/2025  -     CBC Auto Differential; Future; Expected date: 05/27/2025  -     Lipid Panel; Future; Expected date: 05/27/2025  -      Hemoglobin A1C; Future; Expected date: 05/27/2025  -     TSH; Future; Expected date: 05/27/2025  - Risk and age appropriate anticipatory guidance. HM reviewed and updated. Recommendations discussed with patient as appropriate.     2. Allergic rhinitis, unspecified seasonality, unspecified trigger  -     azelastine (ASTELIN) 137 mcg (0.1 %) nasal spray; 1 spray (137 mcg total) by Nasal route 2 (two) times daily as needed for Rhinitis.  Dispense: 30 mL; Refill: 3  -     fluticasone propionate (FLONASE) 50 mcg/actuation nasal spray; 1 spray (50 mcg total) by Each Nostril route once daily.  Dispense: 15.8 mL; Refill: 11    3. Essential hypertension  Comments:  EKG normal  Continue current medication    Orders:  -     metoprolol succinate (TOPROL-XL) 25 MG 24 hr tablet; Take 1 tablet (25 mg total) by mouth once daily.  Dispense: 90 tablet; Refill: 3    4. Recurrent cold sores  Comments:  Continue current medication  Orders:  -     acyclovir (ZOVIRAX) 400 MG tablet; TAKE 1 TABLET BY MOUTH TWICE A DAY START AT ONSET OF SYMPTOMS. TAKE FOR 5 DAYS TOTAL  Dispense: 30 tablet; Refill: 3    5. Intermittent diarrhea  Comments:  Continue current medication  Orders:  -     diphenoxylate-atropine 2.5-0.025 mg (LOMOTIL) 2.5-0.025 mg per tablet; Take 1 tablet by mouth daily as needed for Diarrhea.  Dispense: 30 tablet; Refill: 3  -     Tissue transglutaminase, IgA; Future; Expected date: 05/27/2025  -     IgA; Future; Expected date: 05/27/2025    6. Gastroesophageal reflux disease without esophagitis  -     pantoprazole (PROTONIX) 40 MG tablet; Take 1 tablet (40 mg total) by mouth once daily.  Dispense: 90 tablet; Refill: 3    7. Class 1 obesity due to excess calories with serious comorbidity and body mass index (BMI) of 33.0 to 33.9 in adult  Overview:  Pt advised that alcohol reduction and cessation will likely result in some weight loss     8. Recurrent UTI  -     nitrofurantoin, macrocrystal-monohydrate, (MACROBID) 100 MG  capsule; Take 1 capsule (100 mg total) by mouth as needed (after intercourse).  Dispense: 30 capsule; Refill: 3    9. Encounter for surveillance of contraceptive pills  -     norgestimate-ethinyl estradioL (TRI-LO-GIOVANNI) 0.18/0.215/0.25 mg-0.025 mg tablet; Take 1 tablet by mouth once daily.  Dispense: 90 tablet; Refill: 3    10. Encounter for screening mammogram for breast cancer  -     Mammo Digital Screening Bilat w/ Ayaan (XPD); Future; Expected date: 03/01/2026    11. Alcohol use  Overview:  Counseled on importance of reduction with goal of cessation in order to avoid adverse outcomes such as liver disease    12. High risk HPV infection  Orders:  -     Ambulatory referral/consult to Obstetrics / Gynecology; Future; Expected date: 06/03/2025    14. Immunization due  -     pneumoc 20-norma conj-dip cr(PF) (PREVNAR-20 (PF)) injection Syrg 0.5 mL    Patient's questions answered. Plan reviewed with patient at the end of visit. Relevant precautions to chief complaint and reasons to seek further medical care or to contact the office sooner reviewed with patient.     Follow up in about 10 months (around 3/27/2026) for Annual Exam, (prelabs).        Part of this note was dictated using voice recognition software. Please excuse any typographical errors.     This note was generated with the assistance of ambient listening technology. Verbal consent was obtained by the patient and accompanying visitor(s) for the recording of patient appointment to facilitate this note. I attest to having reviewed and edited the generated note for accuracy, though some syntax or spelling errors may persist. Please contact the author of this note for any clarification.

## 2025-06-11 DIAGNOSIS — B00.1 RECURRENT COLD SORES: ICD-10-CM

## 2025-06-11 RX ORDER — ACYCLOVIR 400 MG/1
TABLET ORAL
Qty: 30 TABLET | Refills: 3 | Status: SHIPPED | OUTPATIENT
Start: 2025-06-11

## 2025-06-11 NOTE — TELEPHONE ENCOUNTER
No care due was identified.  [facility] Embedded Care Due Messages. Reference number: [id number].   6/11/2025 11:37:49 AM CDT

## 2025-06-11 NOTE — TELEPHONE ENCOUNTER
Refill Decision Note   Dyan Arredondo  is requesting a refill authorization.  Brief Assessment and Rationale for Refill:  Approve     Medication Therapy Plan:         Comments:     Note composed:6:28 PM 06/11/2025             Appointments     Last Visit   5/27/2025 Tanja Kraft MD   Next Visit   Visit date not found Tanja Kraft MD

## 2025-06-13 ENCOUNTER — OFFICE VISIT (OUTPATIENT)
Dept: DERMATOLOGY | Facility: CLINIC | Age: 55
End: 2025-06-13
Payer: COMMERCIAL

## 2025-06-13 DIAGNOSIS — L66.10 LICHEN PLANOPILARIS: Primary | ICD-10-CM

## 2025-06-13 PROCEDURE — 99214 OFFICE O/P EST MOD 30 MIN: CPT | Mod: 25,S$GLB,, | Performed by: STUDENT IN AN ORGANIZED HEALTH CARE EDUCATION/TRAINING PROGRAM

## 2025-06-13 PROCEDURE — 3044F HG A1C LEVEL LT 7.0%: CPT | Mod: CPTII,S$GLB,, | Performed by: STUDENT IN AN ORGANIZED HEALTH CARE EDUCATION/TRAINING PROGRAM

## 2025-06-13 PROCEDURE — 1159F MED LIST DOCD IN RCRD: CPT | Mod: CPTII,S$GLB,, | Performed by: STUDENT IN AN ORGANIZED HEALTH CARE EDUCATION/TRAINING PROGRAM

## 2025-06-13 PROCEDURE — 11901 INJECT SKIN LESIONS >7: CPT | Mod: S$GLB,,, | Performed by: STUDENT IN AN ORGANIZED HEALTH CARE EDUCATION/TRAINING PROGRAM

## 2025-06-13 PROCEDURE — 1160F RVW MEDS BY RX/DR IN RCRD: CPT | Mod: CPTII,S$GLB,, | Performed by: STUDENT IN AN ORGANIZED HEALTH CARE EDUCATION/TRAINING PROGRAM

## 2025-06-13 PROCEDURE — 99999 PR PBB SHADOW E&M-EST. PATIENT-LVL III: CPT | Mod: PBBFAC,,, | Performed by: STUDENT IN AN ORGANIZED HEALTH CARE EDUCATION/TRAINING PROGRAM

## 2025-06-13 RX ORDER — CLOBETASOL PROPIONATE 0.5 MG/ML
SOLUTION TOPICAL
Qty: 50 ML | Refills: 6 | Status: SHIPPED | OUTPATIENT
Start: 2025-06-13

## 2025-06-13 NOTE — PROGRESS NOTES
Subjective:      Patient ID:  Dyan Arredondo is a 54 y.o. female who presents for   Chief Complaint   Patient presents with    Follow-up     HPI    Last office visit with me     Alopecia follow up- favoring lichen planopilaris- has not had biopsy    06/15/2025  She discontinue clobetasol solution 3-4 weeks ago because she did not have it when traveling- she has noticed worsening of shedding and itching since this time  Otherwise reports largely unchanged   Minoxidil 5% foam 1 x daily  Did not start spironolactone- was traveling   Serial ILK  Increased stress- recently laid off from job of 20+ years    Prior tx:  Can't do doxycycline - allergic    Initial hx hair loss:  Hair loss and scalp itching present 1 + year  3-2025 tsh wnl    Derm hx:  History of blistering sunburns: No  History of tanning bed use: Yes  Family history of melanoma: pt's brother is currently being treated for melanoma, her father  from skin cancer so she also thinks this was melanoma  Personal history of mole removal: Yes- DF excision on  with me  Personal history of skin cancer: No      Review of Systems   All other systems reviewed and are negative.      Objective:   Physical Exam   Constitutional: She appears well-developed and well-nourished. No distress.   Neurological: She is alert and oriented to person, place, and time. She is not disoriented.   Psychiatric: She has a normal mood and affect.   Skin:   Areas Examined (abnormalities noted in diagram):   Scalp / Hair Palpated and Inspected       Diagram Legend     Erythematous scaling macule/papule c/w actinic keratosis       Vascular papule c/w angioma      Pigmented verrucoid papule/plaque c/w seborrheic keratosis      Yellow umbilicated papule c/w sebaceous hyperplasia      Irregularly shaped tan macule c/w lentigo     1-2 mm smooth white papules consistent with Milia      Movable subcutaneous cyst with punctum c/w epidermal inclusion cyst      Subcutaneous movable  cyst c/w pilar cyst      Firm pink to brown papule c/w dermatofibroma      Pedunculated fleshy papule(s) c/w skin tag(s)      Evenly pigmented macule c/w junctional nevus     Mildly variegated pigmented, slightly irregular-bordered macule c/w mildly atypical nevus      Flesh colored to evenly pigmented papule c/w intradermal nevus       Pink pearly papule/plaque c/w basal cell carcinoma      Erythematous hyperkeratotic cursted plaque c/w SCC      Surgical scar with no sign of skin cancer recurrence      Open and closed comedones      Inflammatory papules and pustules      Verrucoid papule consistent consistent with wart     Erythematous eczematous patches and plaques     Dystrophic onycholytic nail with subungual debris c/w onychomycosis     Umbilicated papule    Erythematous-base heme-crusted tan verrucoid plaque consistent with inflamed seborrheic keratosis     Erythematous Silvery Scaling Plaque c/w Psoriasis     See annotation                      Assessment / Plan:        Lichen planopilaris  Status: chronic condition flaring and/or not at treatment goal    Pruritus, erythema, tight perifollicular scale, and patchy early scarring loss of frontal, part of scalp  Patient has not had scalp biopsy, have offered but deferred  We have discussed in detail many visits, favored diagnosis of LPP, would recommend to confirm with biopsy. This is scalp inflammation which can lead to scarring areas of hair loss if not treated  I do feel this condition is active/worsening which we discussed. She attributes this to no clobetasol liquid past 3 weeks, which was helping  She elects to continue topical/ILK and defers biopsy / addition of HCQ today prefers to continue to monitor. She has hx of allergies to numerous oral medications including doxy.    Plan:  Restart clobetasol solution to AA scalp Mon-Fri, weekends off  Cont minoxidil 5% solution or foam to scalp daily    Intralesional Kenalog 5 mg/cc (3.0 cc total) injected into >7  lesions on the scalp and surgical hypertrophic scar R shoulder today after obtaining verbal consent including risk of surrounding hypopigmentation. Patient tolerated procedure well.    Future considerations:  Biopsy is recommended. Plaquenil, finasteride, LDOM  Recommend to check Vitamin D and ferritin with bloodwork     Units: 1  NDC for Kenalog 10mg/cc:  7094-5319-94    RTC 2 mo, sooner prn

## 2025-06-17 DIAGNOSIS — B00.1 RECURRENT COLD SORES: ICD-10-CM

## 2025-06-17 RX ORDER — ACYCLOVIR 400 MG/1
TABLET ORAL
Qty: 30 TABLET | Refills: 3 | OUTPATIENT
Start: 2025-06-17

## 2025-06-17 NOTE — TELEPHONE ENCOUNTER
No care due was identified.  [facility] Embedded Care Due Messages. Reference number: [id number].   6/17/2025 9:40:13 AM CDT

## 2025-06-17 NOTE — TELEPHONE ENCOUNTER
Refill Decision Note   Dyan Arredondo  is requesting a refill authorization.  Brief Assessment and Rationale for Refill:  Quick Discontinue     Medication Therapy Plan:  Receipt confirmed by pharmacy (6/11/2025  6:28 PM CDT)      Comments:     Note composed:12:27 PM 06/17/2025

## 2025-07-30 ENCOUNTER — OFFICE VISIT (OUTPATIENT)
Dept: OBSTETRICS AND GYNECOLOGY | Facility: CLINIC | Age: 55
End: 2025-07-30
Attending: OBSTETRICS & GYNECOLOGY
Payer: COMMERCIAL

## 2025-07-30 VITALS
BODY MASS INDEX: 34.38 KG/M2 | WEIGHT: 226.88 LBS | DIASTOLIC BLOOD PRESSURE: 70 MMHG | HEIGHT: 68 IN | SYSTOLIC BLOOD PRESSURE: 140 MMHG

## 2025-07-30 DIAGNOSIS — B97.7 HIGH RISK HPV INFECTION: ICD-10-CM

## 2025-07-30 DIAGNOSIS — Z01.419 WELL FEMALE EXAM WITH ROUTINE GYNECOLOGICAL EXAM: Primary | ICD-10-CM

## 2025-07-30 PROBLEM — Z30.41 ENCOUNTER FOR SURVEILLANCE OF CONTRACEPTIVE PILLS: Status: RESOLVED | Noted: 2025-05-27 | Resolved: 2025-07-30

## 2025-07-30 PROCEDURE — 3077F SYST BP >= 140 MM HG: CPT | Mod: CPTII,S$GLB,, | Performed by: OBSTETRICS & GYNECOLOGY

## 2025-07-30 PROCEDURE — 99386 PREV VISIT NEW AGE 40-64: CPT | Mod: S$GLB,,, | Performed by: OBSTETRICS & GYNECOLOGY

## 2025-07-30 PROCEDURE — 87624 HPV HI-RISK TYP POOLED RSLT: CPT | Performed by: OBSTETRICS & GYNECOLOGY

## 2025-07-30 PROCEDURE — 3078F DIAST BP <80 MM HG: CPT | Mod: CPTII,S$GLB,, | Performed by: OBSTETRICS & GYNECOLOGY

## 2025-07-30 PROCEDURE — 3044F HG A1C LEVEL LT 7.0%: CPT | Mod: CPTII,S$GLB,, | Performed by: OBSTETRICS & GYNECOLOGY

## 2025-07-30 PROCEDURE — 99999 PR PBB SHADOW E&M-EST. PATIENT-LVL IV: CPT | Mod: PBBFAC,,, | Performed by: OBSTETRICS & GYNECOLOGY

## 2025-07-30 PROCEDURE — 3008F BODY MASS INDEX DOCD: CPT | Mod: CPTII,S$GLB,, | Performed by: OBSTETRICS & GYNECOLOGY

## 2025-07-30 PROCEDURE — 1160F RVW MEDS BY RX/DR IN RCRD: CPT | Mod: CPTII,S$GLB,, | Performed by: OBSTETRICS & GYNECOLOGY

## 2025-07-30 PROCEDURE — 88175 CYTOPATH C/V AUTO FLUID REDO: CPT | Mod: TC | Performed by: OBSTETRICS & GYNECOLOGY

## 2025-07-30 PROCEDURE — 1159F MED LIST DOCD IN RCRD: CPT | Mod: CPTII,S$GLB,, | Performed by: OBSTETRICS & GYNECOLOGY

## 2025-07-30 RX ORDER — LACTIC ACID, L-, CITRIC ACID MONOHYDRATE, AND POTASSIUM BITARTRATE 90; 50; 20 MG/5G; MG/5G; MG/5G
1 GEL VAGINAL DAILY
Qty: 60 G | Refills: 12 | Status: SHIPPED | OUTPATIENT
Start: 2025-07-30

## 2025-07-30 RX ORDER — FLUCONAZOLE 150 MG/1
150 TABLET ORAL ONCE
Qty: 1 TABLET | Refills: 0 | Status: SHIPPED | OUTPATIENT
Start: 2025-07-30 | End: 2025-07-30

## 2025-07-30 NOTE — PROGRESS NOTES
SUBJECTIVE:   54 y.o. female   for routine gyn exam. Patient's last menstrual period was 2025 (approximate)..  She has no unusual complaints          Past Medical History:   Diagnosis Date    Allergy     Breast cyst     High risk HPV infection      Past Surgical History:   Procedure Laterality Date    BREAST CYST EXCISION Right     COLONOSCOPY N/A 2024    Procedure: COLONOSCOPY;  Surgeon: Yeimi De La Torre MD;  Location: [facility] ENDOSCOPY;  Service: Endoscopy;  Laterality: N/A;  Referral:Sakshi Rubio MD / [facility] / [facility] portal - LW  24- pc complete. DBM    EXTERNAL EAR SURGERY      SINUS SURGERY      turbanite       Social History[1]  Family History   Problem Relation Name Age of Onset    Allergies Mother      Cancer Father Vera Patton 80        melanoma    Depression Brother Salomón Patton     Cancer Paternal Aunt Dyan         lung CA +smoker ?    Cancer Paternal Grandmother EG         dx age 50's? +smoker (possibly)    Cancer Paternal Grandfather Vera Sr         lung CA dx age 50's +smoker (possibly)    Breast cancer Neg Hx      Colon cancer Neg Hx      Ovarian cancer Neg Hx       OB History    Para Term  AB Living   0 0 0 0 0 0   SAB IAB Ectopic Multiple Live Births   0 0 0 0 0         Current Medications[2]  Allergies: Amoxicillin trihydrate, Erythromycin base, Omnipaque 140 [iohexol], Penicillins, Tetracyclines, and Clindamycin     ROS:  Constitutional: no weight loss, weight gain, fever, fatigue  Eyes:  No vision changes, glasses/contacts  ENT/Mouth: No ulcers, sinus problems, ears ringing, headache  Cardiovascular: No inability to lie flat, chest pain, exercise intolerance, swelling, heart palpitations  Respiratory: No wheezing, coughing blood, shortness of breath, or cough  Gastrointestinal: No diarrhea, bloody stool, nausea/vomiting, constipation, gas, hemorrhoids  Genitourinary: No blood in urine, painful urination, urgency of urination, frequency of urination,  [[id number]] : 0 "incomplete emptying, incontinence, abnormal bleeding, painful periods, heavy periods, vaginal discharge, vaginal odor, painful intercourse, sexual problems, bleeding after intercourse.  Musculoskeletal: No muscle weakness  Skin/Breast: No painful breasts, nipple discharge, masses, rash, ulcers  Neurological: No passing out, seizures, numbness, headache  Endocrine: No diabetes, hypothyroid, hyperthyroid, hot flashes, hair loss, abnormal hair growth, acne  Psychiatric: No depression, crying  Hematologic: No bruises, bleeding, swollen lymph nodes, anemia.      OBJECTIVE:   The patient appears well, alert, oriented x 3, in no distress.  BP (!) 140/70 (BP Location: Left arm, Patient Position: Sitting)   Ht 5' 8" (1.727 m)   Wt 102.9 kg (226 lb 13.7 oz)   LMP 07/18/2025 (Approximate)   BMI 34.49 kg/m²   NECK: no thyromegaly, trachea midline  SKIN: no acne, striae, hirsutism  CHEST: CTAB  CV: RRR  BREAST EXAM: breasts appear normal, no suspicious masses, no skin or nipple changes or axillary nodes  ABDOMEN: no hernias, masses, or hepatosplenomegaly  GENITALIA: normal external genitalia, no erythema, no discharge  URETHRA: normal urethra, normal urethral meatus  VAGINA: Normal  CERVIX: no lesions or cervical motion tenderness  UTERUS: normal size, contour, position, consistency, mobility, non-tender  ADNEXA: no mass, fullness, tenderness      ASSESSMENT:   1. Well female exam with routine gynecological exam  Liquid-Based Pap Smear, Screening      2. High risk HPV infection  Ambulatory referral/consult to Obstetrics / Gynecology          PLAN:   Orders Placed This Encounter    Liquid-Based Pap Smear, Screening     Discussed healthy lifestyle including regular exercise, healthy eating, etc.  Return to clinic in 1 year         [1]   Social History  Socioeconomic History    Marital status:    Tobacco Use    Smoking status: Never     Passive exposure: Never    Smokeless tobacco: Never   Substance and Sexual Activity " [0] : 2) Feeling down, depressed, or hopeless: Not at all (0)    Alcohol use: Yes     Alcohol/week: 20.0 standard drinks of alcohol     Types: 20 Glasses of wine per week    Drug use: No    Sexual activity: Yes     Partners: Male     Birth control/protection: Other-see comments, OCP     Social Drivers of Health     Financial Resource Strain: Low Risk  (2/19/2025)    Overall Financial Resource Strain (CARDIA)     Difficulty of Paying Living Expenses: Not hard at all   Food Insecurity: No Food Insecurity (2/19/2025)    Hunger Vital Sign     Worried About Running Out of Food in the Last Year: Never true     Ran Out of Food in the Last Year: Never true   Transportation Needs: No Transportation Needs (2/19/2025)    PRAPARE - Transportation     Lack of Transportation (Medical): No     Lack of Transportation (Non-Medical): No   Physical Activity: Inactive (2/19/2025)    Exercise Vital Sign     Days of Exercise per Week: 0 days     Minutes of Exercise per Session: 0 min   Stress: Stress Concern Present (2/19/2025)    [employer] [place] [place] [employer] - Occupational Stress Questionnaire     Feeling of Stress : Rather much   Housing Stability: Low Risk  (2/19/2025)    Housing Stability Vital Sign     Unable to Pay for Housing in the Last Year: No     Number of Times Moved in the Last Year: 0     Homeless in the Last Year: No   [2]   Current Outpatient Medications   Medication Sig Dispense Refill    acyclovir (ZOVIRAX) 400 MG tablet TAKE 1 TABLET BY MOUTH TWICE A DAY START AT ONSET OF SYMPTOMS. TAKE FOR 5 DAYS TOTAL 30 tablet 3    azelastine (ASTELIN) 137 mcg (0.1 %) nasal spray 1 spray (137 mcg total) by Nasal route 2 (two) times daily as needed for Rhinitis. 30 mL 3    clobetasoL (TEMOVATE) 0.05 % external solution Apply to scalp daily Monday-Friday, weekends off 50 mL 5    clobetasoL (TEMOVATE) 0.05 % external solution Apply daily to scalp Monday-Friday, weekends off 50 mL 6    desonide (DESOWEN) 0.05 % cream Apply topically 2 (two) times daily. Apply to affected areas of rash  15 g 1    diphenoxylate-atropine 2.5-0.025 mg (LOMOTIL) 2.5-0.025 mg per tablet Take 1 tablet by mouth daily as needed for Diarrhea. 30 tablet 3    fluticasone propionate (FLONASE) 50 mcg/actuation nasal spray 1 spray (50 mcg total) by Each Nostril route once daily. 15.8 mL 11    metoprolol succinate (TOPROL-XL) 25 MG 24 hr tablet Take 1 tablet (25 mg total) by mouth once daily. 90 tablet 3    nitrofurantoin, macrocrystal-monohydrate, (MACROBID) 100 MG capsule Take 1 capsule (100 mg total) by mouth as needed (after intercourse). 30 capsule 3    norgestimate-ethinyl estradioL (TRI-LO-GIOVANNI) 0.18/0.215/0.25 mg-0.025 mg tablet Take 1 tablet by mouth once daily. 90 tablet 3    pantoprazole (PROTONIX) 40 MG tablet Take 1 tablet (40 mg total) by mouth once daily. 90 tablet 3     No current facility-administered medications for this visit.

## 2025-08-04 LAB
INSULIN SERPL-ACNC: NORMAL U[IU]/ML
LAB AP BETHESDA CATEGORY: NORMAL
LAB AP CLINICAL FINDINGS: NORMAL
LAB AP CONTRACEPTIVES: NORMAL
LAB AP GYN ADDITIONAL FINDINGS: NORMAL
LAB AP LMP DATE: NORMAL
LAB AP OCHS PAP SPECIMEN ADEQUACY: NORMAL
LAB AP OHS PAP INTERPRETATION: NORMAL
LAB AP PAP DISCLAIMER COMMENTS: NORMAL
LAB AP PAP ESTROGEN REPLACEMENT THERAPY: NORMAL
LAB AP PAP PMP: NORMAL
LAB AP PAP PREVIOUS BX: NORMAL
LAB AP PAP PRIOR TREATMENT: NORMAL
LAB AP PERFORMING LOCATION(S): NORMAL

## 2025-08-05 ENCOUNTER — OFFICE VISIT (OUTPATIENT)
Dept: DERMATOLOGY | Facility: CLINIC | Age: 55
End: 2025-08-05
Payer: COMMERCIAL

## 2025-08-05 DIAGNOSIS — L30.9 DERMATITIS, UNSPECIFIED: Primary | ICD-10-CM

## 2025-08-05 DIAGNOSIS — L66.10 LICHEN PLANOPILARIS: ICD-10-CM

## 2025-08-05 DIAGNOSIS — D23.39 ANGIOFIBROMA OF SKIN OF NOSE: ICD-10-CM

## 2025-08-05 PROCEDURE — 99214 OFFICE O/P EST MOD 30 MIN: CPT | Mod: 25,S$GLB,, | Performed by: STUDENT IN AN ORGANIZED HEALTH CARE EDUCATION/TRAINING PROGRAM

## 2025-08-05 PROCEDURE — 99999 PR PBB SHADOW E&M-EST. PATIENT-LVL III: CPT | Mod: PBBFAC,,, | Performed by: STUDENT IN AN ORGANIZED HEALTH CARE EDUCATION/TRAINING PROGRAM

## 2025-08-05 PROCEDURE — 11104 PUNCH BX SKIN SINGLE LESION: CPT | Mod: S$GLB,,, | Performed by: STUDENT IN AN ORGANIZED HEALTH CARE EDUCATION/TRAINING PROGRAM

## 2025-08-05 PROCEDURE — 1160F RVW MEDS BY RX/DR IN RCRD: CPT | Mod: CPTII,S$GLB,, | Performed by: STUDENT IN AN ORGANIZED HEALTH CARE EDUCATION/TRAINING PROGRAM

## 2025-08-05 PROCEDURE — 1159F MED LIST DOCD IN RCRD: CPT | Mod: CPTII,S$GLB,, | Performed by: STUDENT IN AN ORGANIZED HEALTH CARE EDUCATION/TRAINING PROGRAM

## 2025-08-05 PROCEDURE — 3044F HG A1C LEVEL LT 7.0%: CPT | Mod: CPTII,S$GLB,, | Performed by: STUDENT IN AN ORGANIZED HEALTH CARE EDUCATION/TRAINING PROGRAM

## 2025-08-05 NOTE — PROGRESS NOTES
Subjective:      Patient ID:  Dyan Arredondo is a 54 y.o. female who presents for   Chief Complaint   Patient presents with    Hair Loss     Follow up      HPI    Last office visit with me 2025 for hair loss, here for follow up    Alopecia follow up- would favor LPP at this time has not had biopsy  Pt in past deferred biopsy but now amenable  Hair loss continues to worsen  Scalp pruritus has improved from prior visits. ILK LV. Using clobetasol soln Mon-Fri, weekends off    Patient with new area of concern:   Location: left nose   Duration: 4 yr   Symptoms: asymptomatic   Previous treatments: none     Prior tx:  Can't do doxycycline - allergic    Initial hx hair loss:  Hair loss and scalp itching present 1 + year  3-2025 tsh wnl    Derm hx:  History of blistering sunburns: No  History of tanning bed use: Yes  Family history of melanoma: pt's brother is currently being treated for melanoma, her father  from skin cancer so she also thinks this was melanoma  Personal history of mole removal: Yes- DF excision on  with me  Personal history of skin cancer: No    Review of Systems   Skin:  Positive for daily sunscreen use (Face and neck), activity-related sunscreen use and wears hat. Negative for recent sunburn.   All other systems reviewed and are negative.  Hematologic/Lymphatic: Bruises/bleeds easily.       Objective:   Physical Exam   Constitutional: She appears well-developed and well-nourished. No distress.   Neurological: She is alert and oriented to person, place, and time. She is not disoriented.   Psychiatric: She has a normal mood and affect.   Skin:   Areas Examined (abnormalities noted in diagram):   Scalp / Hair Palpated and Inspected  Head / Face Inspection Performed            Diagram Legend     Erythematous scaling macule/papule c/w actinic keratosis       Vascular papule c/w angioma      Pigmented verrucoid papule/plaque c/w seborrheic keratosis      Yellow umbilicated papule c/w  sebaceous hyperplasia      Irregularly shaped tan macule c/w lentigo     1-2 mm smooth white papules consistent with Milia      Movable subcutaneous cyst with punctum c/w epidermal inclusion cyst      Subcutaneous movable cyst c/w pilar cyst      Firm pink to brown papule c/w dermatofibroma      Pedunculated fleshy papule(s) c/w skin tag(s)      Evenly pigmented macule c/w junctional nevus     Mildly variegated pigmented, slightly irregular-bordered macule c/w mildly atypical nevus      Flesh colored to evenly pigmented papule c/w intradermal nevus       Pink pearly papule/plaque c/w basal cell carcinoma      Erythematous hyperkeratotic cursted plaque c/w SCC      Surgical scar with no sign of skin cancer recurrence      Open and closed comedones      Inflammatory papules and pustules      Verrucoid papule consistent consistent with wart     Erythematous eczematous patches and plaques     Dystrophic onycholytic nail with subungual debris c/w onychomycosis     Umbilicated papule    Erythematous-base heme-crusted tan verrucoid plaque consistent with inflamed seborrheic keratosis     Erythematous Silvery Scaling Plaque c/w Psoriasis     See annotation                    Assessment / Plan:        Dermatitis, unspecified   Lichen planopilaris  Status: chronic condition flaring and/or not at treatment goal     Pruritus, erythema, dermoscopic tight perifollicular scale, and patchy loss of frontal and midline of scalp  We have discussed in detail many visits, favored diagnosis of LPP, would recommend to confirm with biopsy. This is scalp inflammation which can lead to scarring areas of hair loss if not treated  In past pt deferred bx/escalation to HCQ. Hx allergy to many oral medications, including doxycycline     Plan:  Pt amenable to biopsy today due to noticing worsening  Cont clobetasol solution to AA scalp Mon-Fri, weekends off  Cont minoxidil 5% solution or foam to scalp daily  Can do ILK on day not doing  biopsy    Punch biopsy procedure note:  Punch biopsy performed after verbal consent obtained. Area marked and prepped with alcohol. Approximately 1cc of 1% lidocaine with epinephrine injected. 4 mm disposable punch used to remove lesion. Hemostasis obtained and biopsy site closed with 1 - 2 Prolene sutures. Wound care instructions reviewed with patient and handout given.    Fibrous papule of nose  Reassurance  Would not recommend cosmetic removal at this time. Hx keloidal scarring    RTC 10 day s/r and ILK scalp

## 2025-08-07 LAB
HPV DNA, HIGH RISK TYPE 16, PCR (OHS): POSITIVE
HPV DNA, HIGH RISK TYPE 18, PCR (OHS): NEGATIVE
HPV DNA, HIGH RISK TYPE OTHER, PCR (OHS): NEGATIVE

## 2025-08-12 ENCOUNTER — OFFICE VISIT (OUTPATIENT)
Dept: DERMATOLOGY | Facility: CLINIC | Age: 55
End: 2025-08-12
Payer: COMMERCIAL

## 2025-08-12 DIAGNOSIS — L66.10 LICHEN PLANOPILARIS: Primary | ICD-10-CM

## 2025-08-12 PROCEDURE — 99999 PR PBB SHADOW E&M-EST. PATIENT-LVL III: CPT | Mod: PBBFAC,,, | Performed by: STUDENT IN AN ORGANIZED HEALTH CARE EDUCATION/TRAINING PROGRAM

## 2025-08-12 RX ORDER — TRIAMCINOLONE ACETONIDE 10 MG/ML
5 INJECTION, SUSPENSION INTRA-ARTICULAR; INTRALESIONAL ONCE
Status: SHIPPED | OUTPATIENT
Start: 2025-08-12